# Patient Record
Sex: MALE | Race: WHITE | Employment: OTHER | URBAN - NONMETROPOLITAN AREA
[De-identification: names, ages, dates, MRNs, and addresses within clinical notes are randomized per-mention and may not be internally consistent; named-entity substitution may affect disease eponyms.]

---

## 2017-04-11 ENCOUNTER — OFFICE VISIT (OUTPATIENT)
Dept: FAMILY MEDICINE CLINIC | Age: 77
End: 2017-04-11

## 2017-04-11 VITALS
DIASTOLIC BLOOD PRESSURE: 62 MMHG | BODY MASS INDEX: 25.79 KG/M2 | WEIGHT: 190.4 LBS | SYSTOLIC BLOOD PRESSURE: 135 MMHG | HEIGHT: 72 IN | OXYGEN SATURATION: 97 % | HEART RATE: 70 BPM

## 2017-04-11 DIAGNOSIS — N40.1 BENIGN NON-NODULAR PROSTATIC HYPERPLASIA WITH LOWER URINARY TRACT SYMPTOMS: ICD-10-CM

## 2017-04-11 DIAGNOSIS — I10 ESSENTIAL HYPERTENSION: Primary | ICD-10-CM

## 2017-04-11 DIAGNOSIS — Z12.5 PROSTATE CANCER SCREENING: ICD-10-CM

## 2017-04-11 DIAGNOSIS — H35.30 MACULAR DEGENERATION: ICD-10-CM

## 2017-04-11 DIAGNOSIS — H40.9 GLAUCOMA OF BOTH EYES, UNSPECIFIED GLAUCOMA: ICD-10-CM

## 2017-04-11 PROCEDURE — G8420 CALC BMI NORM PARAMETERS: HCPCS | Performed by: FAMILY MEDICINE

## 2017-04-11 PROCEDURE — 1036F TOBACCO NON-USER: CPT | Performed by: FAMILY MEDICINE

## 2017-04-11 PROCEDURE — 1123F ACP DISCUSS/DSCN MKR DOCD: CPT | Performed by: FAMILY MEDICINE

## 2017-04-11 PROCEDURE — 99214 OFFICE O/P EST MOD 30 MIN: CPT | Performed by: FAMILY MEDICINE

## 2017-04-11 PROCEDURE — G8428 CUR MEDS NOT DOCUMENT: HCPCS | Performed by: FAMILY MEDICINE

## 2017-04-11 PROCEDURE — 4040F PNEUMOC VAC/ADMIN/RCVD: CPT | Performed by: FAMILY MEDICINE

## 2017-04-11 RX ORDER — TAMSULOSIN HYDROCHLORIDE 0.4 MG/1
0.4 CAPSULE ORAL DAILY
Qty: 30 CAPSULE | Refills: 3 | Status: SHIPPED | OUTPATIENT
Start: 2017-04-11 | End: 2017-11-28 | Stop reason: SDUPTHER

## 2017-04-11 ASSESSMENT — ENCOUNTER SYMPTOMS
BLURRED VISION: 0
ABDOMINAL PAIN: 0
SHORTNESS OF BREATH: 0

## 2017-04-26 DIAGNOSIS — Z12.5 PROSTATE CANCER SCREENING: ICD-10-CM

## 2017-04-26 DIAGNOSIS — I10 ESSENTIAL HYPERTENSION: ICD-10-CM

## 2017-04-26 LAB
ALT SERPL-CCNC: 18 U/L (ref 0–41)
ANION GAP SERPL CALCULATED.3IONS-SCNC: 15 MEQ/L (ref 7–13)
AST SERPL-CCNC: 22 U/L (ref 0–40)
BACTERIA: NORMAL /HPF
BASOPHILS ABSOLUTE: 0.1 K/UL (ref 0–0.2)
BASOPHILS RELATIVE PERCENT: 0.8 %
BILIRUBIN URINE: ABNORMAL
BLOOD, URINE: NEGATIVE
BUN BLDV-MCNC: 12 MG/DL (ref 8–23)
CALCIUM SERPL-MCNC: 8.9 MG/DL (ref 8.6–10.2)
CASTS: NORMAL /LPF
CHLORIDE BLD-SCNC: 100 MEQ/L (ref 98–107)
CHOLESTEROL, TOTAL: 201 MG/DL (ref 0–199)
CLARITY: ABNORMAL
CO2: 25 MEQ/L (ref 22–29)
COLOR: ABNORMAL
CREAT SERPL-MCNC: 0.64 MG/DL (ref 0.7–1.2)
EOSINOPHILS ABSOLUTE: 0.1 K/UL (ref 0–0.7)
EOSINOPHILS RELATIVE PERCENT: 1.6 %
EPITHELIAL CELLS, UA: NORMAL /HPF
GFR AFRICAN AMERICAN: >60
GFR NON-AFRICAN AMERICAN: >60
GLUCOSE BLD-MCNC: 87 MG/DL (ref 74–109)
GLUCOSE URINE: NEGATIVE MG/DL
HCT VFR BLD CALC: 38.3 % (ref 42–52)
HDLC SERPL-MCNC: 140 MG/DL (ref 40–59)
HEMOGLOBIN: 12.9 G/DL (ref 14–18)
KETONES, URINE: >=80 MG/DL
LDL CHOLESTEROL CALCULATED: 53 MG/DL (ref 0–129)
LEUKOCYTE ESTERASE, URINE: ABNORMAL
LYMPHOCYTES ABSOLUTE: 0.7 K/UL (ref 1–4.8)
LYMPHOCYTES RELATIVE PERCENT: 10.2 %
MCH RBC QN AUTO: 34.6 PG (ref 27–31.3)
MCHC RBC AUTO-ENTMCNC: 33.6 % (ref 33–37)
MCV RBC AUTO: 103.2 FL (ref 80–100)
MONOCYTES ABSOLUTE: 0.5 K/UL (ref 0.2–0.8)
MONOCYTES RELATIVE PERCENT: 6.8 %
MUCUS: PRESENT
NEUTROPHILS ABSOLUTE: 5.6 K/UL (ref 1.4–6.5)
NEUTROPHILS RELATIVE PERCENT: 80.6 %
NITRITE, URINE: NEGATIVE
PDW BLD-RTO: 14.8 % (ref 11.5–14.5)
PH UA: 5.5 (ref 5–9)
PLATELET # BLD: 144 K/UL (ref 130–400)
POTASSIUM SERPL-SCNC: 3.9 MEQ/L (ref 3.5–5.1)
PROSTATE SPECIFIC ANTIGEN: 0.87 NG/ML (ref 0–6.22)
PROTEIN UA: ABNORMAL MG/DL
RBC # BLD: 3.71 M/UL (ref 4.7–6.1)
RBC UA: NORMAL /HPF (ref 0–2)
RENAL EPITHELIAL, UA: NORMAL /HPF
SODIUM BLD-SCNC: 140 MEQ/L (ref 132–144)
SPECIFIC GRAVITY UA: 1.02 (ref 1–1.03)
TRIGL SERPL-MCNC: 39 MG/DL (ref 0–200)
UROBILINOGEN, URINE: 1 E.U./DL
WBC # BLD: 6.9 K/UL (ref 4.8–10.8)
WBC UA: NORMAL /HPF (ref 0–5)

## 2017-07-11 ENCOUNTER — OFFICE VISIT (OUTPATIENT)
Dept: FAMILY MEDICINE CLINIC | Age: 77
End: 2017-07-11

## 2017-07-11 VITALS
WEIGHT: 181.2 LBS | BODY MASS INDEX: 24.54 KG/M2 | DIASTOLIC BLOOD PRESSURE: 62 MMHG | HEART RATE: 68 BPM | OXYGEN SATURATION: 97 % | SYSTOLIC BLOOD PRESSURE: 106 MMHG | HEIGHT: 72 IN

## 2017-07-11 DIAGNOSIS — I10 ESSENTIAL HYPERTENSION: Primary | ICD-10-CM

## 2017-07-11 DIAGNOSIS — N40.1 BENIGN NON-NODULAR PROSTATIC HYPERPLASIA WITH LOWER URINARY TRACT SYMPTOMS: ICD-10-CM

## 2017-07-11 PROCEDURE — 1123F ACP DISCUSS/DSCN MKR DOCD: CPT | Performed by: FAMILY MEDICINE

## 2017-07-11 PROCEDURE — 1036F TOBACCO NON-USER: CPT | Performed by: FAMILY MEDICINE

## 2017-07-11 PROCEDURE — 4040F PNEUMOC VAC/ADMIN/RCVD: CPT | Performed by: FAMILY MEDICINE

## 2017-07-11 PROCEDURE — G8420 CALC BMI NORM PARAMETERS: HCPCS | Performed by: FAMILY MEDICINE

## 2017-07-11 PROCEDURE — 99214 OFFICE O/P EST MOD 30 MIN: CPT | Performed by: FAMILY MEDICINE

## 2017-07-11 PROCEDURE — G8427 DOCREV CUR MEDS BY ELIG CLIN: HCPCS | Performed by: FAMILY MEDICINE

## 2017-07-11 ASSESSMENT — ENCOUNTER SYMPTOMS
ABDOMINAL PAIN: 0
SHORTNESS OF BREATH: 0
BLURRED VISION: 0

## 2017-07-14 ENCOUNTER — APPOINTMENT (OUTPATIENT)
Dept: GENERAL RADIOLOGY | Age: 77
End: 2017-07-14
Payer: MEDICARE

## 2017-07-14 ENCOUNTER — APPOINTMENT (OUTPATIENT)
Dept: CT IMAGING | Age: 77
End: 2017-07-14
Payer: MEDICARE

## 2017-07-14 ENCOUNTER — HOSPITAL ENCOUNTER (EMERGENCY)
Age: 77
Discharge: HOME | End: 2017-07-14
Payer: MEDICARE

## 2017-07-14 VITALS
TEMPERATURE: 98.9 F | WEIGHT: 181 LBS | HEIGHT: 73 IN | OXYGEN SATURATION: 100 % | BODY MASS INDEX: 23.99 KG/M2 | HEART RATE: 86 BPM | DIASTOLIC BLOOD PRESSURE: 74 MMHG | RESPIRATION RATE: 14 BRPM | SYSTOLIC BLOOD PRESSURE: 152 MMHG

## 2017-07-14 DIAGNOSIS — R53.1 GENERAL WEAKNESS: ICD-10-CM

## 2017-07-14 DIAGNOSIS — E86.0 DEHYDRATION: Primary | ICD-10-CM

## 2017-07-14 LAB
ALBUMIN SERPL-MCNC: 4.4 G/DL (ref 3.9–4.9)
ALP BLD-CCNC: 47 U/L (ref 35–104)
ALT SERPL-CCNC: 52 U/L (ref 0–41)
AMPHETAMINE SCREEN, URINE: NORMAL
ANION GAP SERPL CALCULATED.3IONS-SCNC: 16 MEQ/L (ref 7–13)
AST SERPL-CCNC: 60 U/L (ref 0–40)
BARBITURATE SCREEN URINE: NORMAL
BASOPHILS ABSOLUTE: 0 K/UL (ref 0–0.2)
BASOPHILS RELATIVE PERCENT: 0.8 %
BENZODIAZEPINE SCREEN, URINE: NORMAL
BILIRUB SERPL-MCNC: 0.8 MG/DL (ref 0–1.2)
BILIRUBIN URINE: ABNORMAL
BLOOD, URINE: NEGATIVE
BUN BLDV-MCNC: 17 MG/DL (ref 8–23)
CALCIUM SERPL-MCNC: 8.9 MG/DL (ref 8.6–10.2)
CANNABINOID SCREEN URINE: NORMAL
CHLORIDE BLD-SCNC: 97 MEQ/L (ref 98–107)
CHP ED QC CHECK: NORMAL
CLARITY: CLEAR
CO2: 24 MEQ/L (ref 22–29)
COCAINE METABOLITE SCREEN URINE: NORMAL
COLOR: YELLOW
CREAT SERPL-MCNC: 0.76 MG/DL (ref 0.7–1.2)
EOSINOPHILS ABSOLUTE: 0.1 K/UL (ref 0–0.7)
EOSINOPHILS RELATIVE PERCENT: 2.3 %
ETHANOL PERCENT: NORMAL G/DL
ETHANOL: <10 MG/DL (ref 0–0.08)
GFR AFRICAN AMERICAN: >60
GFR NON-AFRICAN AMERICAN: >60
GLOBULIN: 2.3 G/DL (ref 2.3–3.5)
GLUCOSE BLD-MCNC: 103 MG/DL
GLUCOSE BLD-MCNC: 103 MG/DL (ref 60–115)
GLUCOSE BLD-MCNC: 96 MG/DL (ref 74–109)
GLUCOSE URINE: NEGATIVE MG/DL
HCT VFR BLD CALC: 40 % (ref 42–52)
HEMOGLOBIN: 13 G/DL (ref 14–18)
KETONES, URINE: 40 MG/DL
LACTIC ACID: 1.3 MMOL/L (ref 0.5–2.2)
LEUKOCYTE ESTERASE, URINE: NEGATIVE
LYMPHOCYTES ABSOLUTE: 1 K/UL (ref 1–4.8)
LYMPHOCYTES RELATIVE PERCENT: 17.1 %
Lab: NORMAL
MAGNESIUM: 1.8 MG/DL (ref 1.7–2.3)
MCH RBC QN AUTO: 33.6 PG (ref 27–31.3)
MCHC RBC AUTO-ENTMCNC: 32.6 % (ref 33–37)
MCV RBC AUTO: 103.1 FL (ref 80–100)
MONOCYTES ABSOLUTE: 0.5 K/UL (ref 0.2–0.8)
MONOCYTES RELATIVE PERCENT: 9.2 %
MUCUS: PRESENT
NEUTROPHILS ABSOLUTE: 4.2 K/UL (ref 1.4–6.5)
NEUTROPHILS RELATIVE PERCENT: 70.6 %
NITRITE, URINE: NEGATIVE
OPIATE SCREEN URINE: NORMAL
PDW BLD-RTO: 13.9 % (ref 11.5–14.5)
PERFORMED ON: NORMAL
PH UA: 8 (ref 5–9)
PHENCYCLIDINE SCREEN URINE: NORMAL
PLATELET # BLD: 123 K/UL (ref 130–400)
POTASSIUM SERPL-SCNC: 4.2 MEQ/L (ref 3.5–5.1)
PRO-BNP: 129 PG/ML
PROTEIN UA: 30 MG/DL
RBC # BLD: 3.88 M/UL (ref 4.7–6.1)
RBC UA: NORMAL /HPF (ref 0–2)
SODIUM BLD-SCNC: 137 MEQ/L (ref 132–144)
SPECIFIC GRAVITY UA: 1.02 (ref 1–1.03)
TOTAL CK: 57 U/L (ref 0–190)
TOTAL PROTEIN: 6.7 G/DL (ref 6.4–8.1)
TROPONIN: <0.01 NG/ML (ref 0–0.01)
URINE REFLEX TO CULTURE: YES
UROBILINOGEN, URINE: 2 E.U./DL
WBC # BLD: 5.9 K/UL (ref 4.8–10.8)
WBC UA: NORMAL /HPF (ref 0–5)

## 2017-07-14 PROCEDURE — 83735 ASSAY OF MAGNESIUM: CPT

## 2017-07-14 PROCEDURE — 80053 COMPREHEN METABOLIC PANEL: CPT

## 2017-07-14 PROCEDURE — 83880 ASSAY OF NATRIURETIC PEPTIDE: CPT

## 2017-07-14 PROCEDURE — 93005 ELECTROCARDIOGRAM TRACING: CPT

## 2017-07-14 PROCEDURE — 83605 ASSAY OF LACTIC ACID: CPT

## 2017-07-14 PROCEDURE — 80307 DRUG TEST PRSMV CHEM ANLYZR: CPT

## 2017-07-14 PROCEDURE — 81001 URINALYSIS AUTO W/SCOPE: CPT

## 2017-07-14 PROCEDURE — 70450 CT HEAD/BRAIN W/O DYE: CPT

## 2017-07-14 PROCEDURE — 2580000003 HC RX 258: Performed by: PHYSICIAN ASSISTANT

## 2017-07-14 PROCEDURE — 99285 EMERGENCY DEPT VISIT HI MDM: CPT

## 2017-07-14 PROCEDURE — 85025 COMPLETE CBC W/AUTO DIFF WBC: CPT

## 2017-07-14 PROCEDURE — 96360 HYDRATION IV INFUSION INIT: CPT

## 2017-07-14 PROCEDURE — 82550 ASSAY OF CK (CPK): CPT

## 2017-07-14 PROCEDURE — 87086 URINE CULTURE/COLONY COUNT: CPT

## 2017-07-14 PROCEDURE — 84484 ASSAY OF TROPONIN QUANT: CPT

## 2017-07-14 PROCEDURE — 71010 XR CHEST PORTABLE: CPT

## 2017-07-14 PROCEDURE — G0480 DRUG TEST DEF 1-7 CLASSES: HCPCS

## 2017-07-14 PROCEDURE — 36415 COLL VENOUS BLD VENIPUNCTURE: CPT

## 2017-07-14 PROCEDURE — 96361 HYDRATE IV INFUSION ADD-ON: CPT

## 2017-07-14 RX ORDER — 0.9 % SODIUM CHLORIDE 0.9 %
1000 INTRAVENOUS SOLUTION INTRAVENOUS ONCE
Status: COMPLETED | OUTPATIENT
Start: 2017-07-14 | End: 2017-07-14

## 2017-07-14 RX ADMIN — SODIUM CHLORIDE 1000 ML: 9 INJECTION, SOLUTION INTRAVENOUS at 18:20

## 2017-07-14 ASSESSMENT — ENCOUNTER SYMPTOMS
ALLERGIC/IMMUNOLOGIC NEGATIVE: 1
ABDOMINAL PAIN: 0
EYE PAIN: 0
NAUSEA: 1
DIARRHEA: 0
VOMITING: 0
PHOTOPHOBIA: 0
VOICE CHANGE: 0
APNEA: 0
TROUBLE SWALLOWING: 0
COLOR CHANGE: 0
BLOOD IN STOOL: 0
SHORTNESS OF BREATH: 0

## 2017-07-16 LAB — URINE CULTURE, ROUTINE: NORMAL

## 2017-07-17 LAB
EKG ATRIAL RATE: 77 BPM
EKG P AXIS: 16 DEGREES
EKG P-R INTERVAL: 152 MS
EKG Q-T INTERVAL: 398 MS
EKG QRS DURATION: 92 MS
EKG QTC CALCULATION (BAZETT): 450 MS
EKG R AXIS: 26 DEGREES
EKG T AXIS: 29 DEGREES
EKG VENTRICULAR RATE: 77 BPM

## 2017-07-21 ENCOUNTER — OFFICE VISIT (OUTPATIENT)
Dept: FAMILY MEDICINE CLINIC | Age: 77
End: 2017-07-21

## 2017-07-21 VITALS
SYSTOLIC BLOOD PRESSURE: 172 MMHG | HEIGHT: 72 IN | HEART RATE: 70 BPM | TEMPERATURE: 98.2 F | BODY MASS INDEX: 24.98 KG/M2 | WEIGHT: 184.4 LBS | DIASTOLIC BLOOD PRESSURE: 88 MMHG | OXYGEN SATURATION: 97 %

## 2017-07-21 DIAGNOSIS — R53.1 WEAKNESS: ICD-10-CM

## 2017-07-21 DIAGNOSIS — Z09 HOSPITAL DISCHARGE FOLLOW-UP: ICD-10-CM

## 2017-07-21 DIAGNOSIS — R55 SYNCOPE, UNSPECIFIED SYNCOPE TYPE: Primary | ICD-10-CM

## 2017-07-21 DIAGNOSIS — R01.1 HEART MURMUR: ICD-10-CM

## 2017-07-21 DIAGNOSIS — I35.0 AORTIC STENOSIS, MILD: ICD-10-CM

## 2017-07-21 DIAGNOSIS — I10 ESSENTIAL HYPERTENSION: ICD-10-CM

## 2017-07-21 PROCEDURE — G8427 DOCREV CUR MEDS BY ELIG CLIN: HCPCS | Performed by: NURSE PRACTITIONER

## 2017-07-21 PROCEDURE — 4040F PNEUMOC VAC/ADMIN/RCVD: CPT | Performed by: NURSE PRACTITIONER

## 2017-07-21 PROCEDURE — 1036F TOBACCO NON-USER: CPT | Performed by: NURSE PRACTITIONER

## 2017-07-21 PROCEDURE — 1123F ACP DISCUSS/DSCN MKR DOCD: CPT | Performed by: NURSE PRACTITIONER

## 2017-07-21 PROCEDURE — 99214 OFFICE O/P EST MOD 30 MIN: CPT | Performed by: NURSE PRACTITIONER

## 2017-07-21 PROCEDURE — G8419 CALC BMI OUT NRM PARAM NOF/U: HCPCS | Performed by: NURSE PRACTITIONER

## 2017-07-21 ASSESSMENT — ENCOUNTER SYMPTOMS
COUGH: 0
SHORTNESS OF BREATH: 0

## 2017-07-31 ENCOUNTER — TELEPHONE (OUTPATIENT)
Dept: FAMILY MEDICINE CLINIC | Age: 77
End: 2017-07-31

## 2017-07-31 DIAGNOSIS — I10 ESSENTIAL HYPERTENSION: Primary | ICD-10-CM

## 2017-07-31 RX ORDER — VALSARTAN 40 MG/1
40 TABLET ORAL DAILY
Qty: 90 TABLET | Refills: 3 | Status: SHIPPED | OUTPATIENT
Start: 2017-07-31 | End: 2017-11-02 | Stop reason: ALTCHOICE

## 2017-08-16 ENCOUNTER — HOSPITAL ENCOUNTER (OUTPATIENT)
Dept: ULTRASOUND IMAGING | Age: 77
Discharge: HOME | End: 2017-08-16
Payer: MEDICARE

## 2017-08-16 ENCOUNTER — HOSPITAL ENCOUNTER (OUTPATIENT)
Dept: NON INVASIVE DIAGNOSTICS | Age: 77
Discharge: HOME | End: 2017-08-16
Payer: MEDICARE

## 2017-08-16 DIAGNOSIS — R53.1 WEAKNESS: ICD-10-CM

## 2017-08-16 DIAGNOSIS — R55 SYNCOPE, UNSPECIFIED SYNCOPE TYPE: ICD-10-CM

## 2017-08-16 DIAGNOSIS — I35.0 AORTIC STENOSIS, MILD: ICD-10-CM

## 2017-08-16 DIAGNOSIS — R01.1 HEART MURMUR: ICD-10-CM

## 2017-08-16 DIAGNOSIS — Z09 HOSPITAL DISCHARGE FOLLOW-UP: ICD-10-CM

## 2017-08-16 LAB
LV EF: 65 %
LVEF MODALITY: NORMAL

## 2017-08-16 PROCEDURE — 93880 EXTRACRANIAL BILAT STUDY: CPT

## 2017-08-16 PROCEDURE — 93306 TTE W/DOPPLER COMPLETE: CPT

## 2017-08-19 ENCOUNTER — HOSPITAL ENCOUNTER (EMERGENCY)
Age: 77
Discharge: HOME | End: 2017-08-19
Attending: EMERGENCY MEDICINE
Payer: MEDICARE

## 2017-08-19 VITALS
RESPIRATION RATE: 20 BRPM | HEIGHT: 72 IN | TEMPERATURE: 99 F | SYSTOLIC BLOOD PRESSURE: 150 MMHG | DIASTOLIC BLOOD PRESSURE: 73 MMHG | HEART RATE: 60 BPM | WEIGHT: 180 LBS | BODY MASS INDEX: 24.38 KG/M2 | OXYGEN SATURATION: 98 %

## 2017-08-19 DIAGNOSIS — S01.81XA FACIAL LACERATION, INITIAL ENCOUNTER: Primary | ICD-10-CM

## 2017-08-19 PROCEDURE — 12013 RPR F/E/E/N/L/M 2.6-5.0 CM: CPT

## 2017-08-19 PROCEDURE — 2500000003 HC RX 250 WO HCPCS: Performed by: EMERGENCY MEDICINE

## 2017-08-19 PROCEDURE — 99282 EMERGENCY DEPT VISIT SF MDM: CPT

## 2017-08-19 PROCEDURE — 6370000000 HC RX 637 (ALT 250 FOR IP): Performed by: EMERGENCY MEDICINE

## 2017-08-19 RX ORDER — AMOXICILLIN 500 MG/1
500 CAPSULE ORAL 3 TIMES DAILY
Qty: 21 CAPSULE | Refills: 0 | Status: SHIPPED | OUTPATIENT
Start: 2017-08-19 | End: 2017-08-26

## 2017-08-19 RX ORDER — LIDOCAINE HYDROCHLORIDE AND EPINEPHRINE 10; 10 MG/ML; UG/ML
20 INJECTION, SOLUTION INFILTRATION; PERINEURAL ONCE
Status: COMPLETED | OUTPATIENT
Start: 2017-08-19 | End: 2017-08-19

## 2017-08-19 RX ORDER — GINSENG 100 MG
CAPSULE ORAL ONCE
Status: COMPLETED | OUTPATIENT
Start: 2017-08-19 | End: 2017-08-19

## 2017-08-19 RX ORDER — AMOXICILLIN 500 MG/1
500 CAPSULE ORAL ONCE
Status: COMPLETED | OUTPATIENT
Start: 2017-08-19 | End: 2017-08-19

## 2017-08-19 RX ADMIN — AMOXICILLIN 500 MG: 500 CAPSULE ORAL at 20:44

## 2017-08-19 RX ADMIN — BACITRACIN: 500 OINTMENT TOPICAL at 20:44

## 2017-08-19 RX ADMIN — LIDOCAINE HYDROCHLORIDE AND EPINEPHRINE 50 ML: 10; 10 INJECTION, SOLUTION INFILTRATION; PERINEURAL at 20:02

## 2017-08-19 ASSESSMENT — ENCOUNTER SYMPTOMS
SHORTNESS OF BREATH: 0
COUGH: 0
BACK PAIN: 0
DIARRHEA: 0
ABDOMINAL PAIN: 0
FACIAL SWELLING: 0

## 2017-08-19 ASSESSMENT — PAIN SCALES - GENERAL: PAINLEVEL_OUTOF10: 0

## 2017-08-29 ENCOUNTER — TELEPHONE (OUTPATIENT)
Dept: FAMILY MEDICINE CLINIC | Age: 77
End: 2017-08-29

## 2017-08-29 ENCOUNTER — OFFICE VISIT (OUTPATIENT)
Dept: FAMILY MEDICINE CLINIC | Age: 77
End: 2017-08-29

## 2017-08-29 VITALS
OXYGEN SATURATION: 99 % | WEIGHT: 182 LBS | HEIGHT: 72 IN | HEART RATE: 68 BPM | SYSTOLIC BLOOD PRESSURE: 130 MMHG | BODY MASS INDEX: 24.65 KG/M2 | DIASTOLIC BLOOD PRESSURE: 76 MMHG

## 2017-08-29 DIAGNOSIS — I51.89 DIASTOLIC DYSFUNCTION: ICD-10-CM

## 2017-08-29 DIAGNOSIS — Z48.02 VISIT FOR SUTURE REMOVAL: ICD-10-CM

## 2017-08-29 DIAGNOSIS — I10 ESSENTIAL HYPERTENSION: Primary | ICD-10-CM

## 2017-08-29 DIAGNOSIS — I35.0 AORTIC VALVE STENOSIS, SEVERE: Primary | ICD-10-CM

## 2017-08-29 PROCEDURE — G8420 CALC BMI NORM PARAMETERS: HCPCS | Performed by: FAMILY MEDICINE

## 2017-08-29 PROCEDURE — G8427 DOCREV CUR MEDS BY ELIG CLIN: HCPCS | Performed by: FAMILY MEDICINE

## 2017-08-29 PROCEDURE — 1036F TOBACCO NON-USER: CPT | Performed by: FAMILY MEDICINE

## 2017-08-29 PROCEDURE — 4040F PNEUMOC VAC/ADMIN/RCVD: CPT | Performed by: FAMILY MEDICINE

## 2017-08-29 PROCEDURE — 1123F ACP DISCUSS/DSCN MKR DOCD: CPT | Performed by: FAMILY MEDICINE

## 2017-08-29 PROCEDURE — 99214 OFFICE O/P EST MOD 30 MIN: CPT | Performed by: FAMILY MEDICINE

## 2017-08-29 ASSESSMENT — ENCOUNTER SYMPTOMS
SHORTNESS OF BREATH: 0
BLURRED VISION: 0
ABDOMINAL PAIN: 0

## 2017-08-31 RX ORDER — AMLODIPINE BESYLATE 2.5 MG/1
TABLET ORAL
Qty: 90 TABLET | Refills: 3 | Status: SHIPPED | OUTPATIENT
Start: 2017-08-31 | End: 2017-11-02 | Stop reason: ALTCHOICE

## 2017-09-11 ENCOUNTER — TELEPHONE (OUTPATIENT)
Dept: FAMILY MEDICINE CLINIC | Age: 77
End: 2017-09-11

## 2017-11-02 ENCOUNTER — OFFICE VISIT (OUTPATIENT)
Dept: FAMILY MEDICINE CLINIC | Age: 77
End: 2017-11-02

## 2017-11-02 VITALS
HEART RATE: 81 BPM | WEIGHT: 180.6 LBS | DIASTOLIC BLOOD PRESSURE: 78 MMHG | OXYGEN SATURATION: 97 % | HEIGHT: 72 IN | SYSTOLIC BLOOD PRESSURE: 118 MMHG | BODY MASS INDEX: 24.46 KG/M2

## 2017-11-02 DIAGNOSIS — D64.9 ANEMIA, UNSPECIFIED TYPE: ICD-10-CM

## 2017-11-02 DIAGNOSIS — Z23 NEEDS FLU SHOT: ICD-10-CM

## 2017-11-02 DIAGNOSIS — I10 ESSENTIAL HYPERTENSION: Primary | ICD-10-CM

## 2017-11-02 DIAGNOSIS — I35.0 AORTIC VALVE STENOSIS, SEVERE: ICD-10-CM

## 2017-11-02 PROCEDURE — G8427 DOCREV CUR MEDS BY ELIG CLIN: HCPCS | Performed by: FAMILY MEDICINE

## 2017-11-02 PROCEDURE — 1036F TOBACCO NON-USER: CPT | Performed by: FAMILY MEDICINE

## 2017-11-02 PROCEDURE — 1123F ACP DISCUSS/DSCN MKR DOCD: CPT | Performed by: FAMILY MEDICINE

## 2017-11-02 PROCEDURE — G8420 CALC BMI NORM PARAMETERS: HCPCS | Performed by: FAMILY MEDICINE

## 2017-11-02 PROCEDURE — 90662 IIV NO PRSV INCREASED AG IM: CPT | Performed by: FAMILY MEDICINE

## 2017-11-02 PROCEDURE — G0008 ADMIN INFLUENZA VIRUS VAC: HCPCS | Performed by: FAMILY MEDICINE

## 2017-11-02 PROCEDURE — 4040F PNEUMOC VAC/ADMIN/RCVD: CPT | Performed by: FAMILY MEDICINE

## 2017-11-02 PROCEDURE — 99214 OFFICE O/P EST MOD 30 MIN: CPT | Performed by: FAMILY MEDICINE

## 2017-11-02 PROCEDURE — G8484 FLU IMMUNIZE NO ADMIN: HCPCS | Performed by: FAMILY MEDICINE

## 2017-11-02 RX ORDER — VALSARTAN 80 MG/1
80 TABLET ORAL DAILY
COMMUNITY
End: 2018-04-26 | Stop reason: SDUPTHER

## 2017-11-02 ASSESSMENT — PATIENT HEALTH QUESTIONNAIRE - PHQ9
SUM OF ALL RESPONSES TO PHQ QUESTIONS 1-9: 0
2. FEELING DOWN, DEPRESSED OR HOPELESS: 0
1. LITTLE INTEREST OR PLEASURE IN DOING THINGS: 0
SUM OF ALL RESPONSES TO PHQ9 QUESTIONS 1 & 2: 0

## 2017-11-02 ASSESSMENT — ENCOUNTER SYMPTOMS
ABDOMINAL PAIN: 0
SHORTNESS OF BREATH: 0
BLURRED VISION: 0

## 2017-11-02 NOTE — PROGRESS NOTES
Subjective  Yosvany Sol, 65 y.o. male presents today with:  Chief Complaint   Patient presents with    3 Month Follow-Up       Hypertension   This is a chronic problem. The current episode started more than 1 year ago. The problem has been gradually improving since onset. The problem is controlled. Pertinent negatives include no blurred vision, chest pain, headaches, palpitations or shortness of breath. There are no associated agents to hypertension. Risk factors for coronary artery disease include male gender. Past treatments include angiotensin blockers and calcium channel blockers. The current treatment provides moderate improvement. There are no compliance problems. There is no history of kidney disease. diastolic dysfuntion. There is no history of chronic renal disease. Here today for f/u on chronic problems. He is seeing Dr. Juan Jose Dodd for severe AS and is followed by Dr. Elita Osler for anemia. Review of Systems   Constitutional: Negative for fever. Eyes: Negative for blurred vision. Respiratory: Negative for shortness of breath. Cardiovascular: Negative for chest pain and palpitations. Gastrointestinal: Negative for abdominal pain. Skin: Negative for rash. Neurological: Negative for headaches.        Past Medical History:   Diagnosis Date    Actinic keratoses     Aortic stenosis, mild 9/20/2013    Aortic valve stenosis, severe 9/20/2013    BPH (benign prostatic hyperplasia)     Colon polyp 10/75/3991    Diastolic dysfunction 77/59/9218    Stage 2 by echo    ED (erectile dysfunction) 11/29/2011    Glaucoma     Dr. Fanny Bhakta Hemochromatosis     History of echocardiogram 02/18/2015    mild AS, mild AR, mild TR, mild MR    [place]    Hypertension     Iron deficiency anemia 4/28/2014    LVH (left ventricular hypertrophy) 9/20/2013    Macular degeneration     bilateral; dry on left and wet on right-Dr. Debby Ambrosio on back 5/14/2015     Past Surgical History:   Procedure Laterality Date    BACK SURGERY      CATARACT REMOVAL      COLONOSCOPY  11/2009    single polyp right colon    EYE SURGERY Bilateral     OTHER SURGICAL HISTORY  06/08/15    EXC SUBCU MASS BACK    TONSILLECTOMY       Social History     Social History    Marital status:      Spouse name: N/A    Number of children: 3    Years of education: N/A     Occupational History    retired [employer]     Social History Main Topics    Smoking status: Never Smoker    Smokeless tobacco: Never Used    Alcohol use Yes      Comment: rare    Drug use: No    Sexual activity: No     Other Topics Concern    Not on file     Social History Narrative    No narrative on file     No family history on file. No Known Allergies  Current Outpatient Prescriptions   Medication Sig Dispense Refill    valsartan (DIOVAN) 80 MG tablet Take 80 mg by mouth daily      tamsulosin (FLOMAX) 0.4 MG capsule Take 1 capsule by mouth daily 30 capsule 3    FOLIC ACID PO Take by mouth      Omega-3 Fatty Acids (FISH OIL PO) Take by mouth      Cyanocobalamin (VITAMIN B 12 PO) Take by mouth      brimonidine (ALPHAGAN P) 0.1 % SOLN Place 1 drop into both eyes 3 times daily.  dorzolamide-timolol (COSOPT) 22.3-6.8 MG/ML ophthalmic solution Place 1 drop into both eyes 2 times daily.  bimatoprost (LUMIGAN) 0.03 % ophthalmic drops Place 1 drop into the left eye nightly. No current facility-administered medications for this visit. Objective    Vitals:    11/02/17 0812   BP: 118/78   Pulse: 81   SpO2: 97%   Weight: 180 lb 9.6 oz (81.9 kg)   Height: 6' (1.829 m)       Physical Exam   Constitutional: He appears well-developed. HENT:   Head: Normocephalic and atraumatic.    Right Ear: Tympanic membrane, external ear and ear canal normal.   Left Ear: Tympanic membrane, external ear and ear canal normal.   Nose: Nose normal.   Mouth/Throat: Uvula is midline, oropharynx is clear and moist and mucous membranes are normal.   Cardiovascular: Normal rate. Murmur heard. Pulmonary/Chest: Effort normal and breath sounds normal. He has no wheezes. Abdominal: Soft. Bowel sounds are normal. There is no tenderness. Genitourinary: Rectal exam shows no external hemorrhoid and no mass. Musculoskeletal: Normal range of motion. Neurological: He is alert. Skin: Skin is warm and dry. Psychiatric: He has a normal mood and affect. Assessment & Plan   1. Essential hypertension     2. Aortic valve stenosis, severe     3. Anemia, unspecified type     4. Needs flu shot  INFLUENZA, HIGH DOSE, 65 YRS +, IM, PF, PREFILL SYR, 0.5ML (FLUZONE HD)     BP controlled so continue same. Anemia stable and followed by Dr. Karina Adame. He will f/u with cardiology for his AS as scheduled. He will f/u with me in 4 months. Orders Placed This Encounter   Procedures    INFLUENZA, HIGH DOSE, 65 YRS +, IM, PF, PREFILL SYR, 0.5ML (FLUZONE HD)     No orders of the defined types were placed in this encounter. Medications Discontinued During This Encounter   Medication Reason    amLODIPine (NORVASC) 2.5 MG tablet Discontinued by another clinician    valsartan (DIOVAN) 40 MG tablet Therapy completed     Return in about 4 months (around 3/2/2018).     Kaia Mcclure MD

## 2017-11-28 DIAGNOSIS — N40.1 BENIGN NON-NODULAR PROSTATIC HYPERPLASIA WITH LOWER URINARY TRACT SYMPTOMS: ICD-10-CM

## 2017-11-29 DIAGNOSIS — N40.1 BENIGN NON-NODULAR PROSTATIC HYPERPLASIA WITH LOWER URINARY TRACT SYMPTOMS: ICD-10-CM

## 2017-11-29 RX ORDER — TAMSULOSIN HYDROCHLORIDE 0.4 MG/1
CAPSULE ORAL
Qty: 30 CAPSULE | Refills: 2 | OUTPATIENT
Start: 2017-11-29

## 2017-11-29 RX ORDER — TAMSULOSIN HYDROCHLORIDE 0.4 MG/1
0.4 CAPSULE ORAL DAILY
Qty: 30 CAPSULE | Refills: 11 | Status: SHIPPED | OUTPATIENT
Start: 2017-11-29 | End: 2018-04-26 | Stop reason: SDUPTHER

## 2018-04-17 ENCOUNTER — HOSPITAL ENCOUNTER (EMERGENCY)
Age: 78
Discharge: HOME | End: 2018-04-17
Attending: EMERGENCY MEDICINE
Payer: MEDICARE

## 2018-04-17 ENCOUNTER — APPOINTMENT (OUTPATIENT)
Dept: CT IMAGING | Age: 78
End: 2018-04-17
Payer: MEDICARE

## 2018-04-17 VITALS
DIASTOLIC BLOOD PRESSURE: 73 MMHG | BODY MASS INDEX: 24.41 KG/M2 | TEMPERATURE: 97.7 F | SYSTOLIC BLOOD PRESSURE: 115 MMHG | HEART RATE: 64 BPM | OXYGEN SATURATION: 94 % | RESPIRATION RATE: 18 BRPM | WEIGHT: 180 LBS

## 2018-04-17 DIAGNOSIS — S09.90XA INJURY OF HEAD, INITIAL ENCOUNTER: Primary | ICD-10-CM

## 2018-04-17 DIAGNOSIS — S01.01XA LACERATION OF SCALP, INITIAL ENCOUNTER: ICD-10-CM

## 2018-04-17 PROCEDURE — 70450 CT HEAD/BRAIN W/O DYE: CPT

## 2018-04-17 PROCEDURE — 99283 EMERGENCY DEPT VISIT LOW MDM: CPT

## 2018-04-17 PROCEDURE — 6370000000 HC RX 637 (ALT 250 FOR IP): Performed by: EMERGENCY MEDICINE

## 2018-04-17 PROCEDURE — 72125 CT NECK SPINE W/O DYE: CPT

## 2018-04-17 RX ORDER — DIAPER,BRIEF,INFANT-TODD,DISP
EACH MISCELLANEOUS ONCE
Status: COMPLETED | OUTPATIENT
Start: 2018-04-17 | End: 2018-04-17

## 2018-04-17 RX ADMIN — BACITRACIN ZINC: 500 OINTMENT TOPICAL at 10:00

## 2018-04-17 ASSESSMENT — ENCOUNTER SYMPTOMS
SHORTNESS OF BREATH: 0
CHEST TIGHTNESS: 0
VOMITING: 0
NAUSEA: 0
ABDOMINAL PAIN: 0
EYE PAIN: 0
SORE THROAT: 0

## 2018-04-25 ENCOUNTER — OFFICE VISIT (OUTPATIENT)
Dept: FAMILY MEDICINE CLINIC | Age: 78
End: 2018-04-25
Payer: MEDICARE

## 2018-04-25 VITALS
HEART RATE: 71 BPM | SYSTOLIC BLOOD PRESSURE: 136 MMHG | WEIGHT: 178.8 LBS | DIASTOLIC BLOOD PRESSURE: 68 MMHG | OXYGEN SATURATION: 97 % | HEIGHT: 72 IN | BODY MASS INDEX: 24.22 KG/M2

## 2018-04-25 DIAGNOSIS — I10 ESSENTIAL HYPERTENSION: Primary | ICD-10-CM

## 2018-04-25 DIAGNOSIS — D64.9 ANEMIA, UNSPECIFIED TYPE: ICD-10-CM

## 2018-04-25 DIAGNOSIS — H40.9 GLAUCOMA, UNSPECIFIED GLAUCOMA TYPE, UNSPECIFIED LATERALITY: ICD-10-CM

## 2018-04-25 DIAGNOSIS — I35.0 AORTIC VALVE STENOSIS, SEVERE: ICD-10-CM

## 2018-04-25 DIAGNOSIS — N40.0 BENIGN PROSTATIC HYPERPLASIA WITHOUT LOWER URINARY TRACT SYMPTOMS: ICD-10-CM

## 2018-04-25 DIAGNOSIS — Z12.5 PROSTATE CANCER SCREENING: ICD-10-CM

## 2018-04-25 DIAGNOSIS — I10 ESSENTIAL HYPERTENSION: ICD-10-CM

## 2018-04-25 LAB
ALT SERPL-CCNC: 19 U/L (ref 0–41)
ANION GAP SERPL CALCULATED.3IONS-SCNC: 19 MEQ/L (ref 7–13)
ANISOCYTOSIS: ABNORMAL
AST SERPL-CCNC: 24 U/L (ref 0–40)
BANDED NEUTROPHILS RELATIVE PERCENT: 4 %
BASOPHILS ABSOLUTE: 0.1 K/UL (ref 0–0.2)
BASOPHILS RELATIVE PERCENT: 3 %
BUN BLDV-MCNC: 13 MG/DL (ref 8–23)
CALCIUM SERPL-MCNC: 8.8 MG/DL (ref 8.6–10.2)
CHLORIDE BLD-SCNC: 102 MEQ/L (ref 98–107)
CHOLESTEROL, TOTAL: 192 MG/DL (ref 0–199)
CO2: 23 MEQ/L (ref 22–29)
CREAT SERPL-MCNC: 0.71 MG/DL (ref 0.7–1.2)
EOSINOPHILS ABSOLUTE: 0.1 K/UL (ref 0–0.7)
EOSINOPHILS RELATIVE PERCENT: 3 %
GFR AFRICAN AMERICAN: >60
GFR NON-AFRICAN AMERICAN: >60
GLUCOSE BLD-MCNC: 84 MG/DL (ref 74–109)
HCT VFR BLD CALC: 33.5 % (ref 42–52)
HDLC SERPL-MCNC: 122 MG/DL (ref 40–59)
HEMOGLOBIN: 11.4 G/DL (ref 14–18)
LDL CHOLESTEROL CALCULATED: 62 MG/DL (ref 0–129)
LYMPHOCYTES ABSOLUTE: 1.1 K/UL (ref 1–4.8)
LYMPHOCYTES RELATIVE PERCENT: 22 %
MACROCYTES: ABNORMAL
MCH RBC QN AUTO: 37.2 PG (ref 27–31.3)
MCHC RBC AUTO-ENTMCNC: 34.1 % (ref 33–37)
MCV RBC AUTO: 109 FL (ref 80–100)
METAMYELOCYTES RELATIVE PERCENT: 1 %
MONOCYTES ABSOLUTE: 0.2 K/UL (ref 0.2–0.8)
MONOCYTES RELATIVE PERCENT: 4.8 %
NEUTROPHILS ABSOLUTE: 3.2 K/UL (ref 1.4–6.5)
NEUTROPHILS RELATIVE PERCENT: 61 %
PDW BLD-RTO: 15.4 % (ref 11.5–14.5)
PLATELET # BLD: 145 K/UL (ref 130–400)
PLATELET SLIDE REVIEW: ADEQUATE
POTASSIUM SERPL-SCNC: 4.1 MEQ/L (ref 3.5–5.1)
PROMYELOCYTES PERCENT: 1 %
PROSTATE SPECIFIC ANTIGEN: 0.69 NG/ML (ref 0–6.22)
RBC # BLD: 3.08 M/UL (ref 4.7–6.1)
SMUDGE CELLS: 1.9
SODIUM BLD-SCNC: 144 MEQ/L (ref 132–144)
TRIGL SERPL-MCNC: 39 MG/DL (ref 0–200)
WBC # BLD: 4.8 K/UL (ref 4.8–10.8)

## 2018-04-25 PROCEDURE — G8427 DOCREV CUR MEDS BY ELIG CLIN: HCPCS | Performed by: FAMILY MEDICINE

## 2018-04-25 PROCEDURE — G8420 CALC BMI NORM PARAMETERS: HCPCS | Performed by: FAMILY MEDICINE

## 2018-04-25 PROCEDURE — 1036F TOBACCO NON-USER: CPT | Performed by: FAMILY MEDICINE

## 2018-04-25 PROCEDURE — 4040F PNEUMOC VAC/ADMIN/RCVD: CPT | Performed by: FAMILY MEDICINE

## 2018-04-25 PROCEDURE — 99213 OFFICE O/P EST LOW 20 MIN: CPT | Performed by: FAMILY MEDICINE

## 2018-04-25 PROCEDURE — 1123F ACP DISCUSS/DSCN MKR DOCD: CPT | Performed by: FAMILY MEDICINE

## 2018-04-25 ASSESSMENT — ENCOUNTER SYMPTOMS
SHORTNESS OF BREATH: 0
ABDOMINAL PAIN: 0

## 2018-04-26 DIAGNOSIS — N40.1 BENIGN NON-NODULAR PROSTATIC HYPERPLASIA WITH LOWER URINARY TRACT SYMPTOMS: ICD-10-CM

## 2018-04-26 RX ORDER — VALSARTAN 80 MG/1
80 TABLET ORAL DAILY
Qty: 90 TABLET | Refills: 3 | Status: SHIPPED | OUTPATIENT
Start: 2018-04-26 | End: 2018-10-30

## 2018-04-26 RX ORDER — TAMSULOSIN HYDROCHLORIDE 0.4 MG/1
0.4 CAPSULE ORAL DAILY
Qty: 90 CAPSULE | Refills: 3 | Status: ON HOLD | OUTPATIENT
Start: 2018-04-26 | End: 2020-06-29

## 2018-08-02 ENCOUNTER — TELEPHONE (OUTPATIENT)
Dept: FAMILY MEDICINE CLINIC | Age: 78
End: 2018-08-02

## 2018-08-02 NOTE — TELEPHONE ENCOUNTER
Called pt earline Dexter being recalled. He said their pharmacy called them and said they would send him a lot that was not recalled. He said he received that in the mail yesterday.

## 2018-10-29 ENCOUNTER — NURSE ONLY (OUTPATIENT)
Dept: FAMILY MEDICINE CLINIC | Age: 78
End: 2018-10-29

## 2018-10-29 VITALS — DIASTOLIC BLOOD PRESSURE: 78 MMHG | SYSTOLIC BLOOD PRESSURE: 152 MMHG

## 2018-10-29 DIAGNOSIS — Z01.30 BLOOD PRESSURE CHECK: Primary | ICD-10-CM

## 2018-10-30 ENCOUNTER — OFFICE VISIT (OUTPATIENT)
Dept: FAMILY MEDICINE CLINIC | Age: 78
End: 2018-10-30
Payer: MEDICARE

## 2018-10-30 VITALS
SYSTOLIC BLOOD PRESSURE: 160 MMHG | WEIGHT: 174.2 LBS | OXYGEN SATURATION: 97 % | HEART RATE: 68 BPM | BODY MASS INDEX: 23.6 KG/M2 | DIASTOLIC BLOOD PRESSURE: 80 MMHG | HEIGHT: 72 IN

## 2018-10-30 DIAGNOSIS — Z23 NEEDS FLU SHOT: ICD-10-CM

## 2018-10-30 DIAGNOSIS — I10 ESSENTIAL HYPERTENSION: Primary | ICD-10-CM

## 2018-10-30 DIAGNOSIS — G20 PARKINSON DISEASE (HCC): ICD-10-CM

## 2018-10-30 PROCEDURE — 1101F PT FALLS ASSESS-DOCD LE1/YR: CPT | Performed by: FAMILY MEDICINE

## 2018-10-30 PROCEDURE — G8420 CALC BMI NORM PARAMETERS: HCPCS | Performed by: FAMILY MEDICINE

## 2018-10-30 PROCEDURE — 90662 IIV NO PRSV INCREASED AG IM: CPT | Performed by: FAMILY MEDICINE

## 2018-10-30 PROCEDURE — G8427 DOCREV CUR MEDS BY ELIG CLIN: HCPCS | Performed by: FAMILY MEDICINE

## 2018-10-30 PROCEDURE — 1123F ACP DISCUSS/DSCN MKR DOCD: CPT | Performed by: FAMILY MEDICINE

## 2018-10-30 PROCEDURE — 99214 OFFICE O/P EST MOD 30 MIN: CPT | Performed by: FAMILY MEDICINE

## 2018-10-30 PROCEDURE — 4040F PNEUMOC VAC/ADMIN/RCVD: CPT | Performed by: FAMILY MEDICINE

## 2018-10-30 PROCEDURE — G0008 ADMIN INFLUENZA VIRUS VAC: HCPCS | Performed by: FAMILY MEDICINE

## 2018-10-30 PROCEDURE — G8484 FLU IMMUNIZE NO ADMIN: HCPCS | Performed by: FAMILY MEDICINE

## 2018-10-30 PROCEDURE — 1036F TOBACCO NON-USER: CPT | Performed by: FAMILY MEDICINE

## 2018-10-30 RX ORDER — VALSARTAN 40 MG/1
TABLET ORAL
Qty: 30 TABLET | Refills: 2 | Status: ON HOLD | OUTPATIENT
Start: 2018-10-30 | End: 2020-07-01

## 2018-10-30 ASSESSMENT — ENCOUNTER SYMPTOMS
SHORTNESS OF BREATH: 0
ABDOMINAL PAIN: 0

## 2018-10-30 NOTE — PATIENT INSTRUCTIONS
Diovan 40 mg take 1/2 tab daily. Stop in for BP check in 2 weeks. Patient Education        Parkinson's Disease: Care Instructions  Your Care Instructions  Parkinson's disease can cause tremors, stiffness, and problems with movement. Severe or advanced cases can also cause problems with thinking. In Parkinson's disease, part of the brain cannot make enough dopamine, a chemical that helps control movement. Taking your medicines correctly and getting regular exercise may help you maintain your quality of life. There are many things that can cause Parkinson's disease symptoms, including some medicine, some toxins, and trauma to the head. The cause in most cases is not known. Follow-up care is a key part of your treatment and safety. Be sure to make and go to all appointments, and call your doctor if you are having problems. It's also a good idea to know your test results and keep a list of the medicines you take. How can you care for yourself at home? General care  · Take your medicines exactly as prescribed. Call your doctor if you think you are having a problem with your medicine. · Make sure your home is safe:  ¨ Place furniture so that you have something to hold on to as you walk around the house. ¨ Use chairs that make it easier to sit down and stand up. ¨ Group the things you use most, such as reading glasses, keys, and the telephone, in one easy-to-reach place. ¨ Tack down rugs so that you do not trip. ¨ Put no-slip tape and handrails in the tub to prevent falls. · Use a cane, walker, or scooter if your doctor suggests it. · Keep up your normal activities as much as you can. · Find ways to manage stress, which can make symptoms worse. · Spend time with family and friends. Join a support group for people with Parkinson's disease if you want extra help. · Depression is common with this condition.  Tell your doctor if you have trouble sleeping, are eating too much or are not hungry, or feel sad or tearful all the time. Depression can be treated with medicine and counseling. Diet and exercise  · Eat a balanced diet. · If you are taking levodopa, do not eat protein at the same time you take your medicine. Levodopa may not work as well if you take it at the same time you eat protein. You can eat normal amounts of protein. Talk to your doctor if you have questions. · If you have problems swallowing, change how and what you eat:  ¨ Try thick drinks, such as milk shakes. They are easier to swallow than other fluids. ¨ Do not eat foods that crumble easily. These can cause choking. ¨ Use a  to prepare food. Soft foods need less chewing. ¨ Eat small meals often so that you do not get tired from eating heavy meals. · Drink plenty of water and eat a high-fiber diet to prevent constipation. Parkinson's-and the medicines that treat it-may slow your intestines. · Get exercise on most days. Work with your doctor to set up a program of walking, swimming, or other exercise you are able to do. When should you call for help? Call your doctor now or seek immediate medical care if:    · You have a change in your symptoms.     · You develop other problems from your condition, such as:  ¨ Injury from a fall. ¨ Thinking or memory problems. ¨ A urinary tract infection (burning pain when urinating).    Watch closely for changes in your health, and be sure to contact your doctor if:    · You lose weight because of problems with eating.     · You want more information about your condition or your medicines. Where can you learn more? Go to https://[employer]fruits. org and sign in to your [employer] account. Enter N385 in the News360 box to learn more about \"Parkinson's Disease: Care Instructions. \"     If you do not have an account, please click on the \"Sign Up Now\" link. Current as of: October 9, 2017  Content Version: 11.7  © 8844-7908 [employer].  Care instructions adapted

## 2018-10-30 NOTE — PROGRESS NOTES
right-Dr. Tiarra Maddox Mass on back 5/14/2015    Osteoarthritis cervical spine     Parkinson disease ([facility] [address]     Dr. Alexander Villa     Past Surgical History:   Procedure Laterality Date    BACK SURGERY      CARDIAC CATHETERIZATION  11/09/2017    [facility]AR [facility] MD    CATARACT REMOVAL      COLONOSCOPY  11/2009    single polyp right colon    EYE SURGERY Bilateral     OTHER SURGICAL HISTORY  06/08/15    EXC SUBCU MASS BACK    TONSILLECTOMY       Social History     Social History    Marital status:      Spouse name: N/A    Number of children: 3    Years of education: N/A     Occupational History    retired [employer]     Social History Main Topics    Smoking status: Never Smoker    Smokeless tobacco: Never Used    Alcohol use Yes      Comment: rare    Drug use: No    Sexual activity: No     Other Topics Concern    Not on file     Social History Narrative    Lives with wife. No family history on file. No Known Allergies  Current Outpatient Prescriptions   Medication Sig Dispense Refill    carbidopa-levodopa (SINEMET)  MG per tablet Take 1 tablet by mouth 3 times daily      valsartan (DIOVAN) 40 MG tablet Take 1/2 tab daily 30 tablet 2    tamsulosin (FLOMAX) 0.4 MG capsule Take 1 capsule by mouth daily 90 capsule 3    mupirocin (BACTROBAN) 2 % ointment Apply topically 3 times daily. 30 g 0    FOLIC ACID PO Take by mouth      Omega-3 Fatty Acids (FISH OIL PO) Take by mouth      Cyanocobalamin (VITAMIN B 12 PO) Take by mouth      brimonidine (ALPHAGAN P) 0.1 % SOLN Place 1 drop into both eyes 3 times daily.  dorzolamide-timolol (COSOPT) 22.3-6.8 MG/ML ophthalmic solution Place 1 drop into both eyes 2 times daily.  bimatoprost (LUMIGAN) 0.03 % ophthalmic drops Place 1 drop into the left eye nightly. No current facility-administered medications for this visit.         Objective    Vitals:    10/30/18 0908 10/30/18 0913   BP: (!) 160/88 (!) 160/80   Pulse: 68

## 2019-05-23 PROBLEM — D46.Z MDS (MYELODYSPLASTIC SYNDROME), LOW GRADE (HCC): Status: ACTIVE | Noted: 2019-05-23

## 2019-05-23 PROBLEM — D46.20 MDS (MYELODYSPLASTIC SYNDROME), LOW GRADE (HCC): Status: ACTIVE | Noted: 2019-05-23

## 2019-10-15 ENCOUNTER — HOSPITAL ENCOUNTER (EMERGENCY)
Age: 79
Discharge: HOME | End: 2019-10-15
Attending: STUDENT IN AN ORGANIZED HEALTH CARE EDUCATION/TRAINING PROGRAM
Payer: MEDICARE

## 2019-10-15 ENCOUNTER — APPOINTMENT (OUTPATIENT)
Dept: GENERAL RADIOLOGY | Age: 79
End: 2019-10-15
Payer: MEDICARE

## 2019-10-15 VITALS
SYSTOLIC BLOOD PRESSURE: 136 MMHG | HEIGHT: 72 IN | RESPIRATION RATE: 18 BRPM | OXYGEN SATURATION: 98 % | BODY MASS INDEX: 23.03 KG/M2 | DIASTOLIC BLOOD PRESSURE: 88 MMHG | WEIGHT: 170 LBS | HEART RATE: 80 BPM | TEMPERATURE: 99.8 F
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
## 2019-10-15 DIAGNOSIS — F10.920 ALCOHOLIC INTOXICATION WITHOUT COMPLICATION (HCC): ICD-10-CM

## 2019-10-15 DIAGNOSIS — M25.552 LEFT HIP PAIN: ICD-10-CM
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
## 2019-10-15 DIAGNOSIS — S42.92XA CLOSED FRACTURE OF LEFT SHOULDER, INITIAL ENCOUNTER: Primary | ICD-10-CM

## 2019-10-15 LAB
ALBUMIN SERPL-MCNC: 4 G/DL (ref 3.5–4.6)
ALP BLD-CCNC: 68 U/L (ref 35–104)
ALT SERPL-CCNC: 12 U/L (ref 0–41)
ANION GAP SERPL CALCULATED.3IONS-SCNC: 17 MEQ/L (ref 9–15)
ANISOCYTOSIS: ABNORMAL
APTT: 22.6 SEC (ref 24.4–36.8)
AST SERPL-CCNC: 18 U/L (ref 0–40)
BANDED NEUTROPHILS RELATIVE PERCENT: 9 %
BASOPHILS ABSOLUTE: 0 K/UL (ref 0–0.2)
BASOPHILS RELATIVE PERCENT: 0.8 %
BILIRUB SERPL-MCNC: 0.3 MG/DL (ref 0.2–0.7)
BUN BLDV-MCNC: 13 MG/DL (ref 8–23)
CALCIUM SERPL-MCNC: 8.8 MG/DL (ref 8.5–9.9)
CHLORIDE BLD-SCNC: 104 MEQ/L (ref 95–107)
CO2: 22 MEQ/L (ref 20–31)
CREAT SERPL-MCNC: 0.68 MG/DL (ref 0.7–1.2)
EOSINOPHILS ABSOLUTE: 0 K/UL (ref 0–0.7)
EOSINOPHILS RELATIVE PERCENT: 0.5 %
ETHANOL PERCENT: 0.12 G/DL
ETHANOL: 137 MG/DL (ref 0–0.08)
GFR AFRICAN AMERICAN: >60
GFR NON-AFRICAN AMERICAN: >60
GLOBULIN: 2.4 G/DL (ref 2.3–3.5)
GLUCOSE BLD-MCNC: 110 MG/DL (ref 70–99)
HCT VFR BLD CALC: 33.7 % (ref 42–52)
HEMOGLOBIN: 11.4 G/DL (ref 14–18)
INR BLD: 0.9
LYMPHOCYTES ABSOLUTE: 0.7 K/UL (ref 1–4.8)
LYMPHOCYTES RELATIVE PERCENT: 8 %
MCH RBC QN AUTO: 37 PG (ref 27–31.3)
MCHC RBC AUTO-ENTMCNC: 33.8 % (ref 33–37)
MCV RBC AUTO: 109.6 FL (ref 80–100)
METAMYELOCYTES RELATIVE PERCENT: 1 %
MONOCYTES ABSOLUTE: 0.3 K/UL (ref 0.2–0.8)
MONOCYTES RELATIVE PERCENT: 3.8 %
NEUTROPHILS ABSOLUTE: 7.5 K/UL (ref 1.4–6.5)
NEUTROPHILS RELATIVE PERCENT: 79 %
NUCLEATED RED BLOOD CELLS: 1 /100 WBC
PDW BLD-RTO: 15.5 % (ref 11.5–14.5)
PLATELET # BLD: 130 K/UL (ref 130–400)
PLATELET SLIDE REVIEW: ABNORMAL
POIKILOCYTES: ABNORMAL
POTASSIUM SERPL-SCNC: 4.1 MEQ/L (ref 3.4–4.9)
PROTHROMBIN TIME: 12.8 SEC (ref 12.3–14.9)
RBC # BLD: 3.07 M/UL (ref 4.7–6.1)
SODIUM BLD-SCNC: 143 MEQ/L (ref 135–144)
TOTAL CK: 66 U/L (ref 0–190)
TOTAL PROTEIN: 6.4 G/DL (ref 6.3–8)
WBC # BLD: 8.4 K/UL (ref 4.8–10.8)

## 2019-10-15 PROCEDURE — 73552 X-RAY EXAM OF FEMUR 2/>: CPT

## 2019-10-15 PROCEDURE — 99284 EMERGENCY DEPT VISIT MOD MDM: CPT

## 2019-10-15 PROCEDURE — 6830039000 HC L3 TRAUMA ALERT

## 2019-10-15 PROCEDURE — 96374 THER/PROPH/DIAG INJ IV PUSH: CPT

## 2019-10-15 PROCEDURE — G0480 DRUG TEST DEF 1-7 CLASSES: HCPCS

## 2019-10-15 PROCEDURE — 85610 PROTHROMBIN TIME: CPT

## 2019-10-15 PROCEDURE — 80053 COMPREHEN METABOLIC PANEL: CPT

## 2019-10-15 PROCEDURE — 71046 X-RAY EXAM CHEST 2 VIEWS: CPT

## 2019-10-15 PROCEDURE — 82550 ASSAY OF CK (CPK): CPT

## 2019-10-15 PROCEDURE — 73030 X-RAY EXAM OF SHOULDER: CPT

## 2019-10-15 PROCEDURE — 85730 THROMBOPLASTIN TIME PARTIAL: CPT

## 2019-10-15 PROCEDURE — 99222 1ST HOSP IP/OBS MODERATE 55: CPT | Performed by: SURGERY

## 2019-10-15 PROCEDURE — 6360000002 HC RX W HCPCS: Performed by: STUDENT IN AN ORGANIZED HEALTH CARE EDUCATION/TRAINING PROGRAM

## 2019-10-15 PROCEDURE — 85025 COMPLETE CBC W/AUTO DIFF WBC: CPT

## 2019-10-15 PROCEDURE — 73502 X-RAY EXAM HIP UNI 2-3 VIEWS: CPT

## 2019-10-15 RX ORDER — HYDROCODONE BITARTRATE AND ACETAMINOPHEN 5; 325 MG/1; MG/1
2 TABLET ORAL EVERY 6 HOURS PRN
Qty: 12 TABLET | Refills: 0 | Status: SHIPPED | OUTPATIENT
Start: 2019-10-15 | End: 2019-10-18

## 2019-10-15 RX ORDER — FENTANYL CITRATE 50 UG/ML
50 INJECTION, SOLUTION INTRAMUSCULAR; INTRAVENOUS ONCE
Status: COMPLETED | OUTPATIENT
Start: 2019-10-15 | End: 2019-10-15

## 2019-10-15 RX ORDER — ACETAMINOPHEN 160 MG
1000 TABLET,DISINTEGRATING ORAL DAILY
COMMUNITY

## 2019-10-15 RX ADMIN — FENTANYL CITRATE 50 MCG: 50 INJECTION, SOLUTION INTRAMUSCULAR; INTRAVENOUS at 11:01

## 2019-10-15 ASSESSMENT — PAIN SCALES - GENERAL
PAINLEVEL_OUTOF10: 0
PAINLEVEL_OUTOF10: 10
PAINLEVEL_OUTOF10: 10

## 2019-10-15 ASSESSMENT — ENCOUNTER SYMPTOMS
CHEST TIGHTNESS: 0
TROUBLE SWALLOWING: 0
COUGH: 0
SINUS PRESSURE: 0
VOMITING: 0
ABDOMINAL PAIN: 0
DIARRHEA: 0
BACK PAIN: 0
SHORTNESS OF BREATH: 0

## 2019-10-15 ASSESSMENT — PAIN DESCRIPTION - LOCATION: LOCATION: SHOULDER

## 2019-10-15 ASSESSMENT — PAIN DESCRIPTION - ORIENTATION: ORIENTATION: LEFT

## 2019-11-13 LAB
ALT SERPL-CCNC: <5 U/L (ref 0–41)
ANION GAP SERPL CALCULATED.3IONS-SCNC: 15 MEQ/L (ref 9–15)
AST SERPL-CCNC: 14 U/L (ref 0–40)
BUN BLDV-MCNC: 16 MG/DL (ref 8–23)
CALCIUM SERPL-MCNC: 9.6 MG/DL (ref 8.5–9.9)
CHLORIDE BLD-SCNC: 100 MEQ/L (ref 95–107)
CHOLESTEROL, TOTAL: 198 MG/DL (ref 0–199)
CO2: 26 MEQ/L (ref 20–31)
CREAT SERPL-MCNC: 0.82 MG/DL (ref 0.7–1.2)
FOLATE: 4 NG/ML (ref 7.3–26.1)
GFR AFRICAN AMERICAN: >60
GFR NON-AFRICAN AMERICAN: >60
GLUCOSE BLD-MCNC: 92 MG/DL (ref 70–99)
HCT VFR BLD CALC: 39.3 % (ref 42–52)
HDLC SERPL-MCNC: 115 MG/DL (ref 40–59)
HEMOGLOBIN: 12.9 G/DL (ref 14–18)
IRON SATURATION: 48 % (ref 11–46)
IRON: 154 UG/DL (ref 59–158)
LDL CHOLESTEROL CALCULATED: 74 MG/DL (ref 0–129)
MCH RBC QN AUTO: 36.9 PG (ref 27–31.3)
MCHC RBC AUTO-ENTMCNC: 32.8 % (ref 33–37)
MCV RBC AUTO: 112.5 FL (ref 80–100)
PDW BLD-RTO: 15.6 % (ref 11.5–14.5)
PLATELET # BLD: 180 K/UL (ref 130–400)
POTASSIUM SERPL-SCNC: 4.2 MEQ/L (ref 3.4–4.9)
RBC # BLD: 3.49 M/UL (ref 4.7–6.1)
SODIUM BLD-SCNC: 141 MEQ/L (ref 135–144)
TOTAL IRON BINDING CAPACITY: 318 UG/DL (ref 178–450)
TRIGL SERPL-MCNC: 45 MG/DL (ref 0–150)
VITAMIN B-12: 330 PG/ML (ref 232–1245)
VITAMIN D 25-HYDROXY: 41.6 NG/ML (ref 30–100)
WBC # BLD: 6.6 K/UL (ref 4.8–10.8)

## 2019-11-22 ENCOUNTER — HOSPITAL ENCOUNTER (OUTPATIENT)
Dept: NON INVASIVE DIAGNOSTICS | Age: 79
Discharge: HOME | End: 2019-11-22
Payer: MEDICARE

## 2019-11-22 LAB
LV EF: 60 %
LVEF MODALITY: NORMAL

## 2019-11-22 PROCEDURE — 93306 TTE W/DOPPLER COMPLETE: CPT

## 2020-06-29 ENCOUNTER — APPOINTMENT (OUTPATIENT)
Dept: CT IMAGING | Age: 80
DRG: 074 | End: 2020-06-29
Payer: MEDICARE

## 2020-06-29 ENCOUNTER — APPOINTMENT (OUTPATIENT)
Dept: GENERAL RADIOLOGY | Age: 80
DRG: 074 | End: 2020-06-29
Payer: MEDICARE

## 2020-06-29 ENCOUNTER — APPOINTMENT (OUTPATIENT)
Dept: ULTRASOUND IMAGING | Age: 80
DRG: 074 | End: 2020-06-29
Payer: MEDICARE

## 2020-06-29 ENCOUNTER — HOSPITAL ENCOUNTER (INPATIENT)
Age: 80
LOS: 2 days | Discharge: NURSING, REHAB OR LONG-TERM CARE | DRG: 074 | End: 2020-07-01
Attending: EMERGENCY MEDICINE | Admitting: INTERNAL MEDICINE
Payer: MEDICARE

## 2020-06-29 PROBLEM — R55 SYNCOPE AND COLLAPSE: Status: ACTIVE | Noted: 2020-06-29

## 2020-06-29 PROBLEM — S32.009A CLOSED FRACTURE OF BODY OF LUMBAR VERTEBRA (HCC): Status: ACTIVE | Noted: 2020-06-29

## 2020-06-29 LAB
ALBUMIN SERPL-MCNC: 3.7 G/DL (ref 3.5–4.6)
ALP BLD-CCNC: 37 U/L (ref 35–104)
ALT SERPL-CCNC: 9 U/L (ref 0–41)
ANION GAP SERPL CALCULATED.3IONS-SCNC: 23 MEQ/L (ref 9–15)
ANISOCYTOSIS: ABNORMAL
AST SERPL-CCNC: 20 U/L (ref 0–40)
ATYPICAL LYMPHOCYTE RELATIVE PERCENT: 3 %
BANDED NEUTROPHILS RELATIVE PERCENT: 9 %
BASOPHILS ABSOLUTE: 0.1 K/UL (ref 0–0.2)
BASOPHILS RELATIVE PERCENT: 1 %
BETA-HYDROXYBUTYRATE: 15.5 MG/DL (ref 0.2–2.8)
BILIRUB SERPL-MCNC: 0.5 MG/DL (ref 0.2–0.7)
BUN BLDV-MCNC: 18 MG/DL (ref 8–23)
CALCIUM SERPL-MCNC: 8.7 MG/DL (ref 8.5–9.9)
CHLORIDE BLD-SCNC: 102 MEQ/L (ref 95–107)
CO2: 17 MEQ/L (ref 20–31)
CREAT SERPL-MCNC: 0.98 MG/DL (ref 0.7–1.2)
EKG ATRIAL RATE: 70 BPM
EKG P AXIS: 12 DEGREES
EKG P-R INTERVAL: 138 MS
EKG Q-T INTERVAL: 434 MS
EKG QRS DURATION: 92 MS
EKG QTC CALCULATION (BAZETT): 468 MS
EKG R AXIS: 50 DEGREES
EKG T AXIS: 38 DEGREES
EKG VENTRICULAR RATE: 70 BPM
EOSINOPHILS ABSOLUTE: 0 K/UL (ref 0–0.7)
EOSINOPHILS RELATIVE PERCENT: 1 %
FOLATE: 2.6 NG/ML (ref 7.3–26.1)
GFR AFRICAN AMERICAN: >60
GFR NON-AFRICAN AMERICAN: >60
GLOBULIN: 2.4 G/DL (ref 2.3–3.5)
GLUCOSE BLD-MCNC: 133 MG/DL (ref 70–99)
HCT VFR BLD CALC: 32.2 % (ref 42–52)
HEMOGLOBIN: 10.4 G/DL (ref 14–18)
LACTIC ACID: 2.1 MMOL/L (ref 0.5–2.2)
LACTIC ACID: 3.5 MMOL/L (ref 0.5–2.2)
LYMPHOCYTES ABSOLUTE: 2.3 K/UL (ref 1–4.8)
LYMPHOCYTES RELATIVE PERCENT: 16 %
MACROCYTES: ABNORMAL
MAGNESIUM: 1.7 MG/DL (ref 1.7–2.4)
MCH RBC QN AUTO: 36.3 PG (ref 27–31.3)
MCHC RBC AUTO-ENTMCNC: 32.2 % (ref 33–37)
MCV RBC AUTO: 112.7 FL (ref 80–100)
METAMYELOCYTES RELATIVE PERCENT: 1 %
MONOCYTES ABSOLUTE: 0.7 K/UL (ref 0.2–0.8)
MONOCYTES RELATIVE PERCENT: 5.9 %
MYELOCYTE PERCENT: 1 %
NEUTROPHILS ABSOLUTE: 9.2 K/UL (ref 1.4–6.5)
NEUTROPHILS RELATIVE PERCENT: 64 %
NUCLEATED RED BLOOD CELLS: 1 /100 WBC
PDW BLD-RTO: 16.3 % (ref 11.5–14.5)
PLATELET # BLD: 136 K/UL (ref 130–400)
PLATELET SLIDE REVIEW: ADEQUATE
POLYCHROMASIA: ABNORMAL
POTASSIUM SERPL-SCNC: 4.5 MEQ/L (ref 3.4–4.9)
RBC # BLD: 2.86 M/UL (ref 4.7–6.1)
SMUDGE CELLS: 1
SODIUM BLD-SCNC: 142 MEQ/L (ref 135–144)
TOTAL PROTEIN: 6.1 G/DL (ref 6.3–8)
TROPONIN: <0.01 NG/ML (ref 0–0.01)
VITAMIN B-12: 272 PG/ML (ref 232–1245)
WBC # BLD: 12.3 K/UL (ref 4.8–10.8)

## 2020-06-29 PROCEDURE — 87040 BLOOD CULTURE FOR BACTERIA: CPT

## 2020-06-29 PROCEDURE — 2580000003 HC RX 258: Performed by: PHYSICIAN ASSISTANT

## 2020-06-29 PROCEDURE — 84484 ASSAY OF TROPONIN QUANT: CPT

## 2020-06-29 PROCEDURE — 70450 CT HEAD/BRAIN W/O DYE: CPT

## 2020-06-29 PROCEDURE — 2580000003 HC RX 258: Performed by: INTERNAL MEDICINE

## 2020-06-29 PROCEDURE — 36415 COLL VENOUS BLD VENIPUNCTURE: CPT

## 2020-06-29 PROCEDURE — 72100 X-RAY EXAM L-S SPINE 2/3 VWS: CPT

## 2020-06-29 PROCEDURE — 82607 VITAMIN B-12: CPT

## 2020-06-29 PROCEDURE — 6370000000 HC RX 637 (ALT 250 FOR IP): Performed by: EMERGENCY MEDICINE

## 2020-06-29 PROCEDURE — 85025 COMPLETE CBC W/AUTO DIFF WBC: CPT

## 2020-06-29 PROCEDURE — 97167 OT EVAL HIGH COMPLEX 60 MIN: CPT

## 2020-06-29 PROCEDURE — 82746 ASSAY OF FOLIC ACID SERUM: CPT

## 2020-06-29 PROCEDURE — 99285 EMERGENCY DEPT VISIT HI MDM: CPT

## 2020-06-29 PROCEDURE — 71045 X-RAY EXAM CHEST 1 VIEW: CPT

## 2020-06-29 PROCEDURE — 93005 ELECTROCARDIOGRAM TRACING: CPT | Performed by: EMERGENCY MEDICINE

## 2020-06-29 PROCEDURE — 74177 CT ABD & PELVIS W/CONTRAST: CPT

## 2020-06-29 PROCEDURE — 1210000000 HC MED SURG R&B

## 2020-06-29 PROCEDURE — 80053 COMPREHEN METABOLIC PANEL: CPT

## 2020-06-29 PROCEDURE — 6360000004 HC RX CONTRAST MEDICATION: Performed by: EMERGENCY MEDICINE

## 2020-06-29 PROCEDURE — 83605 ASSAY OF LACTIC ACID: CPT

## 2020-06-29 PROCEDURE — 82010 KETONE BODYS QUAN: CPT

## 2020-06-29 PROCEDURE — 6370000000 HC RX 637 (ALT 250 FOR IP): Performed by: INTERNAL MEDICINE

## 2020-06-29 PROCEDURE — 93010 ELECTROCARDIOGRAM REPORT: CPT | Performed by: INTERNAL MEDICINE

## 2020-06-29 PROCEDURE — 93880 EXTRACRANIAL BILAT STUDY: CPT

## 2020-06-29 PROCEDURE — 83735 ASSAY OF MAGNESIUM: CPT

## 2020-06-29 PROCEDURE — 71260 CT THORAX DX C+: CPT

## 2020-06-29 RX ORDER — CHOLECALCIFEROL (VITAMIN D3) 125 MCG
1000 CAPSULE ORAL DAILY
Status: DISCONTINUED | OUTPATIENT
Start: 2020-06-30 | End: 2020-07-01 | Stop reason: HOSPADM

## 2020-06-29 RX ORDER — 0.9 % SODIUM CHLORIDE 0.9 %
1000 INTRAVENOUS SOLUTION INTRAVENOUS ONCE
Status: COMPLETED | OUTPATIENT
Start: 2020-06-29 | End: 2020-06-29

## 2020-06-29 RX ORDER — PROMETHAZINE HYDROCHLORIDE 12.5 MG/1
12.5 TABLET ORAL EVERY 6 HOURS PRN
Status: DISCONTINUED | OUTPATIENT
Start: 2020-06-29 | End: 2020-07-01 | Stop reason: HOSPADM

## 2020-06-29 RX ORDER — DEXTROSE AND SODIUM CHLORIDE 5; .9 G/100ML; G/100ML
INJECTION, SOLUTION INTRAVENOUS CONTINUOUS
Status: DISPENSED | OUTPATIENT
Start: 2020-06-29 | End: 2020-06-30

## 2020-06-29 RX ORDER — ACETAMINOPHEN 650 MG/1
650 SUPPOSITORY RECTAL EVERY 6 HOURS PRN
Status: DISCONTINUED | OUTPATIENT
Start: 2020-06-29 | End: 2020-07-01 | Stop reason: HOSPADM

## 2020-06-29 RX ORDER — VITAMIN B COMPLEX
1000 TABLET ORAL DAILY
Status: DISCONTINUED | OUTPATIENT
Start: 2020-06-30 | End: 2020-07-01 | Stop reason: HOSPADM

## 2020-06-29 RX ORDER — MECLIZINE HYDROCHLORIDE 25 MG/1
25 TABLET ORAL ONCE
Status: COMPLETED | OUTPATIENT
Start: 2020-06-29 | End: 2020-06-29

## 2020-06-29 RX ORDER — SODIUM CHLORIDE 0.9 % (FLUSH) 0.9 %
10 SYRINGE (ML) INJECTION PRN
Status: DISCONTINUED | OUTPATIENT
Start: 2020-06-29 | End: 2020-07-01 | Stop reason: HOSPADM

## 2020-06-29 RX ORDER — ONDANSETRON 2 MG/ML
4 INJECTION INTRAMUSCULAR; INTRAVENOUS EVERY 6 HOURS PRN
Status: DISCONTINUED | OUTPATIENT
Start: 2020-06-29 | End: 2020-07-01 | Stop reason: HOSPADM

## 2020-06-29 RX ORDER — POLYETHYLENE GLYCOL 3350 17 G/17G
17 POWDER, FOR SOLUTION ORAL DAILY PRN
Status: DISCONTINUED | OUTPATIENT
Start: 2020-06-29 | End: 2020-07-01 | Stop reason: HOSPADM

## 2020-06-29 RX ORDER — FOLIC ACID 1 MG/1
1000 TABLET ORAL DAILY
Status: DISCONTINUED | OUTPATIENT
Start: 2020-06-29 | End: 2020-07-01 | Stop reason: HOSPADM

## 2020-06-29 RX ORDER — LATANOPROST 50 UG/ML
1 SOLUTION/ DROPS OPHTHALMIC NIGHTLY
Status: DISCONTINUED | OUTPATIENT
Start: 2020-06-29 | End: 2020-07-01 | Stop reason: HOSPADM

## 2020-06-29 RX ORDER — BRIMONIDINE TARTRATE 2 MG/ML
1 SOLUTION/ DROPS OPHTHALMIC 2 TIMES DAILY
Status: DISCONTINUED | OUTPATIENT
Start: 2020-06-29 | End: 2020-07-01 | Stop reason: HOSPADM

## 2020-06-29 RX ORDER — SODIUM CHLORIDE 0.9 % (FLUSH) 0.9 %
10 SYRINGE (ML) INJECTION EVERY 12 HOURS SCHEDULED
Status: DISCONTINUED | OUTPATIENT
Start: 2020-06-29 | End: 2020-07-01 | Stop reason: HOSPADM

## 2020-06-29 RX ORDER — ACETAMINOPHEN 325 MG/1
650 TABLET ORAL EVERY 6 HOURS PRN
Status: DISCONTINUED | OUTPATIENT
Start: 2020-06-29 | End: 2020-07-01 | Stop reason: HOSPADM

## 2020-06-29 RX ORDER — UREA 10 %
800 LOTION (ML) TOPICAL DAILY
Status: DISCONTINUED | OUTPATIENT
Start: 2020-06-29 | End: 2020-06-29 | Stop reason: CLARIF

## 2020-06-29 RX ADMIN — DEXTROSE AND SODIUM CHLORIDE: 5; 900 INJECTION, SOLUTION INTRAVENOUS at 21:12

## 2020-06-29 RX ADMIN — CARBIDOPA AND LEVODOPA 1 TABLET: 25; 100 TABLET ORAL at 21:10

## 2020-06-29 RX ADMIN — IOPAMIDOL 100 ML: 612 INJECTION, SOLUTION INTRAVENOUS at 11:42

## 2020-06-29 RX ADMIN — FOLIC ACID 1000 MCG: 1 TABLET ORAL at 21:11

## 2020-06-29 RX ADMIN — SODIUM CHLORIDE 1000 ML: 9 INJECTION, SOLUTION INTRAVENOUS at 18:27

## 2020-06-29 RX ADMIN — LATANOPROST 1 DROP: 50 SOLUTION OPHTHALMIC at 21:15

## 2020-06-29 RX ADMIN — MECLIZINE HYDROCHLORIDE 25 MG: 25 TABLET ORAL at 13:21

## 2020-06-29 RX ADMIN — Medication 10 ML: at 21:16

## 2020-06-29 RX ADMIN — BRIMONIDINE TARTRATE 1 DROP: 2 SOLUTION OPHTHALMIC at 21:15

## 2020-06-29 ASSESSMENT — PAIN DESCRIPTION - LOCATION
LOCATION: BACK
LOCATION: RIB CAGE

## 2020-06-29 ASSESSMENT — PAIN SCALES - GENERAL
PAINLEVEL_OUTOF10: 0
PAINLEVEL_OUTOF10: 0
PAINLEVEL_OUTOF10: 2
PAINLEVEL_OUTOF10: 0
PAINLEVEL_OUTOF10: 7

## 2020-06-29 ASSESSMENT — PAIN DESCRIPTION - FREQUENCY: FREQUENCY: CONTINUOUS

## 2020-06-29 ASSESSMENT — PAIN DESCRIPTION - DESCRIPTORS: DESCRIPTORS: DISCOMFORT;SHOOTING

## 2020-06-29 ASSESSMENT — PAIN DESCRIPTION - PAIN TYPE
TYPE: ACUTE PAIN
TYPE: ACUTE PAIN

## 2020-06-29 ASSESSMENT — PAIN DESCRIPTION - ORIENTATION: ORIENTATION: RIGHT;LOWER

## 2020-06-29 NOTE — FLOWSHEET NOTE
1355--Patient arrived to unit room 222, assessment complete, patient awake and alert; oriented to person, place, and time, patient denies pain, no nausea and no vomiting, no cp or sob, oriented to room and call system, call light in reach, bed in lowest position, bed alarm engaged, will continue to monitor. 1915-- orthostatic vitals attempted, patient unable to do standing portion due to dizziness, orthostatic vitals were 99/54 and heart rate of 88 for laying, and sitting 76/46 and heart rate of 98.

## 2020-06-29 NOTE — ED NOTES
Bed: 19  Expected date: 6/29/20  Expected time:   Means of arrival:   Comments:  [de-identified] m syncope      Avery Vasquez RN  06/29/20 9182

## 2020-06-29 NOTE — CONSULTS
Neurosurgical consultation for this 79-year-old male who was admitted to the hospital because of worsening of his consents disease and an episode of syncope. When he fell his right buttock hit a dresser. He has pain in his right posterior ribs and right flank. Most of his pain is in his right buttock. Flank pain is not too bad and his ribs have gotten better. He is convinced that the pain is from a chip. Multiple comorbidities including aortic valve stenosis history of cancer Parkinson's disease macular degeneration. Patient is awake alert responsive talks in a monotone voice but answers appropriately. He is very clear that most of his pain is in his right buttock. He does not have any back rib or flank pain at this time. Physical therapy is with him and he had a hard time getting up out of bed. He became lightheaded and dizzy and had to lay back down. Exam shows he has severe ecchymoses and swelling with extreme tenderness over the tear aspect of his right buttock. Gentle pressure in this area re-creates almost all of his pain. He does not have any central back or lumbar pain no pain to percussion of the spine lumbar thoracic. Range of motion of both hips were intact with tenderness only on the right side in the posterior aspect of the buttock. Good range of motion the knees he is stiff he does have Parkinson's disease with dyskinesias. Review of his studies CT of the abdomen pelvis shows ossification the anterior aspect of L2 read as a chip fracture. This should be confirmed by x-ray. It may not be a fracture but an ossification. Treatment is the same whether or not it is a true fracture. Time and mobilize as his pain allows. He may benefit from an ice pack on the swelling and bruising of the right buttock. Impression fall with soft tissue injury right buttock and bursa. 2.  Progressive Parkinson's disease. 3.  Syncopal episodes.

## 2020-06-29 NOTE — H&P
Hospital Medicine History & Physical      PCP: Wilver Blake MD    Date of Admission: 6/29/2020    Date of Service: 6/29/20      Chief Complaint:  syncope      History Of Present Illness:  [de-identified] y.o. male who presented to [facility] ED for complaints of feeling faint and dizzy with any change in position which started 90 minutes prior to coming to the ED. The patient states that he became dizzy and fell at home hitting his right posterior ribs and flank on a dresser. The patient denied any previous symptoms like this. The patient denies LOC. EMS was called and the patient had a second episode witnessed while sitting up. The patient laid back and had a rapid recovery. While in the ED the patient was being wheeled in a while chair. He suddenly became alarmed and felt like he was falling.      Past Medical History:          Diagnosis Date    Actinic keratoses     Aortic valve stenosis, severe 9/20/2013    BPH (benign prostatic hyperplasia)     Cancer ([facility] [address] 04/2018    skin cancer on chest removed    Colon polyp 78/03/2769    Diastolic dysfunction 73/41/8096    Stage 2 by echo    ED (erectile dysfunction) 11/29/2011    Glaucoma     Dr. Martir Samuels Hemochromatosis     History of echocardiogram 02/18/2015    mild AS, mild AR, mild TR, mild MR    [place]    Hypertension     Iron deficiency anemia 4/28/2014    LVH (left ventricular hypertrophy) 9/20/2013    Macular degeneration     bilateral; dry on left and wet on right-Dr. Chandler Purpura on back 5/14/2015    Osteoarthritis cervical spine     Parkinson disease ([facility] [address]     Dr. Ian Najera       Past Surgical History:          Procedure Laterality Date    BACK SURGERY      CARDIAC CATHETERIZATION  11/09/2017    [facility] MD    CATARACT REMOVAL      COLONOSCOPY  11/2009    single polyp right colon    EYE SURGERY Bilateral     OTHER SURGICAL HISTORY  06/08/15    EXC SUBCU MASS BACK    TONSILLECTOMY Medications Prior to Admission:      Prior to Admission medications    Medication Sig Start Date End Date Taking? Authorizing Provider   Cholecalciferol (VITAMIN D3) 2000 units CAPS Take 1,000 Units by mouth daily   Yes Historical Provider, MD   quinapril (ACCUPRIL) 20 MG tablet Take 1 tablet by mouth daily   Yes Historical Provider, MD   vitamin B-12 (CYANOCOBALAMIN) 100 MCG tablet Take 1 tablet by mouth daily   Yes Historical Provider, MD   Dorzolamide HCl-Timolol Mal (COSOPT OP) Apply 1 drop to eye 2 times daily   Yes Historical Provider, MD   folic acid (FOLVITE) 1 MG tablet Take 1 tablet by mouth daily   Yes Historical Provider, MD   carbidopa-levodopa (SINEMET)  MG per tablet Take 1 tablet by mouth 3 times daily   Yes Historical Provider, MD   valsartan (DIOVAN) 40 MG tablet Take 1/2 tab daily 10/30/18  Yes Oswaldo Lundberg MD   tamsulosin [facility]) 0.4 MG capsule Take 1 capsule by mouth daily 4/26/18  Yes Oswaldo Lundberg MD   mupirocin (BACTROBAN) 2 % ointment Apply topically 3 times daily. 4/25/18  Yes Oswaldo Lundberg MD   Omega-3 Fatty Acids (FISH OIL PO) Take by mouth   Yes Historical Provider, MD   brimonidine (ALPHAGAN P) 0.1 % SOLN Place 1 drop into both eyes 3 times daily. Yes Historical Provider, MD   bimatoprost (LUMIGAN) 0.03 % ophthalmic drops Place 1 drop into the left eye nightly. Yes Historical Provider, MD       Allergies:  Cat hair extract    Social History:      The patient currently lives home    TOBACCO:   reports that he has never smoked. He has never used smokeless tobacco.  ETOH:   reports current alcohol use. Family History:       Positive as follows: HTN        REVIEW OF SYSTEMS:   Pertinent positives as noted in the HPI. All other systems reviewed and negative. PHYSICAL EXAM:    /79   Pulse 72   Temp 97.9 °F (36.6 °C) (Oral)   Resp 16   SpO2 98%     General appearance:  No apparent distress, appears stated age and cooperative.   HEENT:  Normal cephalic, atraumatic without obvious deformity. Pupils equal, round, and reactive to light. Extra ocular muscles intact. Conjunctivae/corneas clear. Neck: Supple, with full range of motion. No jugular venous distention. Trachea midline. Respiratory:  Normal respiratory effort. Clear to auscultation, bilaterally without Rales/Wheezes/Rhonchi. Cardiovascular:  Regular rate and rhythm with normal S1/S2 without murmurs, rubs or gallops. Abdomen: Soft, non-tender, non-distended with normal bowel sounds. Musculoskeletal:  No clubbing, cyanosis or edema bilaterally. Full range of motion without deformity. Skin: Skin color, texture, turgor normal.  No rashes or lesions. Neurologic:  Neurovascularly intact without any focal sensory/motor deficits. Cranial nerves: II-XII intact, grossly non-focal.  Psychiatric:  Alert and oriented, thought content appropriate, normal insight  Capillary Refill: Brisk,< 3 seconds   Peripheral Pulses: +2 palpable, equal bilaterally       Labs:     Recent Labs     06/29/20  1109   WBC 12.3*   HGB 10.4*   HCT 32.2*        Recent Labs     06/29/20  1100      K 4.5      CO2 17*   BUN 18   CREATININE 0.98   CALCIUM 8.7     Recent Labs     06/29/20  1100   AST 20   ALT 9   BILITOT 0.5   ALKPHOS 37     No results for input(s): INR in the last 72 hours. Recent Labs     06/29/20  1100   TROPONINI <0.010       Urinalysis:      Lab Results   Component Value Date    NITRU Negative 07/14/2017    WBCUA 0-2 07/14/2017    BACTERIA Few 04/26/2017    RBCUA 0-2 07/14/2017    BLOODU Negative 07/14/2017    SPECGRAV 1.021 07/14/2017    GLUCOSEU Negative 07/14/2017    GLUCOSEU NEG 12/01/2011       Radiology:     CXR: I have reviewed the CXR with the following interpretation: pending  EKG:  I have reviewed the EKG with the following interpretation: pending    CT HEAD WO CONTRAST   Final Result   1. No CT evidence of acute intracranial abnormality, as questioned.    2. Moderate to moderately severe cerebral volume loss/atrophy with white matter changes consistent with sequelae small vessel ischemic disease. .   3. Vascular calcifications within the bilateral internal carotid artery cavernous segments and the vertebral arteries. .   4. Empty sella. CT CHEST W CONTRAST   Final Result   1. NO ACUTE TRAUMATIC INJURY TO THE CHEST AS DESCRIBED IN BODY OF REPORT. 2. MEDIASTINAL STRUCTURES AND THORACIC AORTA INTACT. NO MEDIASTINAL HEMATOMA. 3. LUNGS APPEAR EXPANDED AND CLEAR WITHOUT A PULMONARY CONTUSION OR PNEUMOTHORAX. 4. DEGENERATIVE BONE SPURRING IN THE THORACIC SPINE AND NO THORACIC SPINE FRACTURE. 5. RIBS APPEAR INTACT AND NO CT EVIDENCE OF A DISPLACED RIB FRACTURE. All CT scans at this facility use dose modulation, iterative reconstruction, and/or weight based dosing when appropriate to reduce radiation dose to as low as reasonably achievable. CT ABDOMEN PELVIS W IV CONTRAST Additional Contrast? None   Final Result   1. SOFT TISSUE CONTUSION INVOLVING THE RIGHT POSTEROLATERAL ABDOMINAL WALL. 2. 10 X 5 X 10 CM RIGHT POSTERIOR ABDOMINAL WALL HEMATOMA WITH L2 CHIP FRACTURE. 3. NO INTRAPERITONEAL OR RETROPERITONEAL HEMORRHAGE OR HEMATOMA. 4. NO ABDOMINAL ORGAN TRAUMATIC INJURY OR ABDOMINAL ORGAN LACERATION. 5. DEGENERATIVE BONE CHANGES IN THE LUMBAR SPINE AND NO LUMBAR SPINE FRACTURE. 6. NO PELVIC FRACTURE AND ADDITIONAL INCIDENTAL FINDINGS IN BODY OF REPORT. All CT scans at this facility use dose modulation, iterative reconstruction, and/or weight based dosing when appropriate to reduce radiation dose to as low as reasonably achievable. XR CHEST PORTABLE   Final Result      No acute radiographic abnormality. ASSESSMENT:    Active Hospital Problems    Diagnosis Date Noted    Syncope and collapse [R55] 06/29/2020       PLAN:        DVT Prophylaxis: lovenox  Diet: No diet orders on file  Code Status: No Order    PT/OT Eval Status: eval and tx    1. Syncope and collapse-will admit and consult Dr. Edinson Norman. Will obtain carotid us, 2D Echo and mri/mra of the brain  2. Vertigo-will medicate with meclizine  3  Blunt trauma to chest  4. Closed fracture of secondary lumbar vertebra, initial encounter-will consult neurosurgery  5. Hematoma  6. Leukocytosis-will obtain U/A C&S       JASON Mcdonald    Thank you Becca Albarado MD for the opportunity to be involved in this patient's care. If you have any questions or concerns please feel free to contact me.

## 2020-06-29 NOTE — PROGRESS NOTES
Spiritual Care Services     Summary of Visit:      Spiritual Assessment/Intervention/Outcomes:    Encounter Summary  Services provided to[de-identified] Patient  Referral/Consult From[de-identified] Nurse  Support System: Spouse  Continue Visiting: No  Complexity of Encounter: Moderate  Length of Encounter: 15 minutes  Advance Care Planning: Yes                 Advance Directives (For Healthcare)  Healthcare Directive: No, patient does not have an advance directive for healthcare treatment  Information on Healthcare Directives Requested: Yes  Patient Requests Assistance: Yes, referral made to   Advance Directives: Documents given, Documents explained           Values / Beliefs  Do you have any ethnic, cultural, sacramental, or spiritual [employer] needs you would like us to be aware of while you are in the hospital?: No    Care Plan:        [id number]   Electronically signed by Rudolph Veronica on 6/29/20 at 3:55 PM EDT     To reach a  for emotional and spiritual support, place an [facility] consult request.   If a  is needed immediately, dial 0 and ask to page the on-call .

## 2020-06-29 NOTE — ACP (ADVANCE CARE PLANNING)
Advance Care Planning     Non-Provider Advance Care Planning (ACP) Note    Date of ACP Conversation: 6/29/2020  Persons included in Conversation: patient  Length of ACP Conversation in minutes: 20 minutes    Conversation requested by:   Patient    Authorized Decision Maker (if patient is incapable of making informed decisions):    This person is:  Next of Kin by law (only applies in absence of above): Patient's wife        General ACP for ALL Patients with Decision Making Capacity:    Advance Directive Conversation with Patients who have not yet planned:  Recommended additional conversation with prospective agent    Review of Existing Advance Directive: (Select questions covered)  Patient does not have advanced directives    Interventions Provided:  Recommended completion of Advance Directive after review of materials, conversation with prospective healthcare agent about (and others as needed), and readiness to complete a written plan

## 2020-06-29 NOTE — ED NOTES
Went to take patient to restroom via  Wheelchair   While pushing patient in the chair  He yelled out and grabbed my arm   Said he felt like he was falling      Notified   Dr Anisa Orlando received for [employer] Jakub Tejada RN  06/29/20

## 2020-06-29 NOTE — CARE COORDINATION
[facility] [place] Case Management Initial Discharge Assessment    Met with Patient to discuss discharge plan. PCP: Mariah Flores MD                                Date of Last Visit: Nov    If no PCP, list provided? N/A    Discharge Planning    Living Arrangements: independently at home    Who do you live with? wife    Who helps you with your care:  self    If lives at home:     Do you have any barriers navigating in your home? no    Patient can perform ADL? Yes    Current Services (outpatient and in home) :  None    Dialysis: No    Is transportation available to get to your appointments? Yes    DME Equipment:  yes - cane    Respiratory equipment: None    Respiratory provider:  no     Pharmacy:  yes - [facility]    Consult with Medication Assistance Program?  No        Does Patient Have a High-Risk for Readmission Diagnosis (CHF, PN, MI, COPD)? H/o diastolic dysfunction with lvh      Initial Discharge Plan? (Note: please see concurrent daily documentation for any updates after initial note). CM to assess for further d/c needs and referrals.       Electronically signed by Britney Bravo on 6/29/2020 at 4:09 PM

## 2020-06-29 NOTE — PROGRESS NOTES
Occupational Therapy  [facility] OCCUPATIONAL THERAPY EVALUATION - ACUTE     NAME: Nikolas Minor  : 1940 ([de-identified] y.o.)  MRN: [id number]  CODE STATUS: Full Code  Room: B923/K185-05    Date of Service: 2020    Patient Diagnosis(es): Syncope and collapse [R55]   Chief Complaint   Patient presents with    Loss of Consciousness     no fall or trauma       Patient Active Problem List    Diagnosis Date Noted    Syncope and collapse 2020    Closed fracture of body of lumbar vertebra ([address] 2020    MDS (myelodysplastic syndrome), low grade ([address] 2019    Parkinson disease ([address]     Macular degeneration     Glaucoma     BPH (benign prostatic hyperplasia)     Anemia 2014    Aortic valve stenosis, severe 2013    LVH (left ventricular hypertrophy)     Diastolic dysfunction     Hypertension 2011        Past Medical History:   Diagnosis Date    Actinic keratoses     Aortic valve stenosis, severe 2013    BPH (benign prostatic hyperplasia)     Cancer ([address] 2018    skin cancer on chest removed    Colon polyp     Diastolic dysfunction     Stage 2 by echo    ED (erectile dysfunction) 2011    Glaucoma     Dr. Jaimes Both Hemochromatosis     History of echocardiogram 2015    mild AS, mild AR, mild TR, mild MR    [place]    Hypertension     Iron deficiency anemia 2014    LVH (left ventricular hypertrophy) 2013    Macular degeneration     bilateral; dry on left and wet on right-Dr. Christobal Duverney on back 2015    Osteoarthritis cervical spine     Parkinson disease ([address]     Dr. Nahid Bernal     Past Surgical History:   Procedure Laterality Date    BACK SURGERY      CARDIAC CATHETERIZATION  2017    [facility] TUSHAR ECHOLS MD    CATARACT REMOVAL      COLONOSCOPY  2009    single polyp right colon    EYE SURGERY Bilateral     OTHER SURGICAL HISTORY 06/08/15    EXC SUBCU MASS BACK    TONSILLECTOMY          Restrictions  Restrictions/Precautions: Fall Risk     Safety Devices: Safety Devices  Safety Devices in place: Yes  Type of devices: All fall risk precautions in place   Initially in place: No    Subjective  Pre Treatment Pain Screening  Pain at present: 10  Scale Used: Numeric Score  Intervention List: Patient able to continue with treatment, Patient declined any intervention  Comments / Details: pain increases with touch or movement    Pain Reassessment:   Pain Assessment  Patient Currently in Pain: Yes  Pain Assessment: 0-10  Pain Level: 2  Pain Type: Acute pain  Pain Location: Back  Pain Orientation: Right, Lower  Pain Descriptors: Discomfort, Shooting  Pain Frequency: Continuous       Prior Level of Function:  Social/Functional History  Lives With: Spouse  Type of Home: House  Home Layout: Two level  Bathroom Shower/Tub: Walk-in shower  Home Equipment: Rolling walker, Cane  Receives Help From: Family  ADL Assistance: Independent  Homemaking Assistance: Independent  Homemaking Responsibilities: Yes  Ambulation Assistance: Independent  Transfer Assistance: Independent  Active : No  Occupation: Retired  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[employer], estate sales, get together with friends    OBJECTIVE:     Orientation Status:  Orientation  Overall Orientation Status: Within Functional Limits    Observation:  Observation/Palpation  Posture: Fair  Observation: laying supine in bed with HOB elevated, pleasant and cooperative, anxious  Edema: R lower back large hematoma    Cognition Status:  Cognition  Overall Cognitive Status: Exceptions  Arousal/Alertness: Appropriate responses to stimuli  Following Commands:  Follows all commands without difficulty  Attention Span: Appears intact  Memory: Appears intact  Safety Judgement: Good awareness of safety precautions  Problem Solving: Able to problem solve independently  Insights: Fully aware of deficits  Initiation: Requires cues for some  Sequencing: Requires cues for some  Cognition Comment: Comp Mod I, Express Independent, Social- Independent, Problem- Mod I, Memory- Independent    Perception Status:  Perception  Overall Perceptual Status: [facility]    Sensation Status:  Sensation  Overall Sensation Status: [facility]    Vision and Hearing Status:  Vision  Vision: Impaired  Vision Exceptions: Wears glasses for reading  Hearing  Hearing: Within functional limits     ROM:   LUE AROM (degrees)  LUE AROM : WFL  Left Hand AROM (degrees)  Left Hand AROM: WFL  RUE AROM (degrees)  RUE AROM : WFL  Right Hand AROM (degrees)  Right Hand AROM: WFL    Strength:  LUE Strength  Gross LUE Strength: Exceptions to [facility]  L Hand General: 3/5  LUE Strength Comment: 3/5 all planes  RUE Strength  Gross RUE Strength: Exceptions to [facility]  R Hand General: 3/5  RUE Strength Comment: 3/5 all planes    Coordination, Tone, Quality of Movement: Tone RUE  RUE Tone: Normotonic  Tone LUE  LUE Tone: Normotonic  Coordination  Movements Are Fluid And Coordinated: No  Coordination and Movement description: Fine motor impairments, Decreased accuracy, Right UE, Left UE    Hand Dominance:  Hand Dominance  Hand Dominance: Right    ADL Status:  ADL  Feeding: Setup  Grooming: Setup  UE Bathing: Setup  LE Bathing: Moderate assistance  UE Dressing: Setup  LE Dressing: Moderate assistance  Toileting:  Moderate assistance  Additional Comments: Simulated all ADLs as above  Toilet Transfers  Toilet Transfer: Unable to assess  Toilet Transfers Comments: anticipate min-CGA       Therapy key for assistance levels -   Independent = Pt. is able to perform task with no assistance but may require a device   Stand by assistance = Pt. does not perform task at an independent level but does not need physical assistance, requires verbal cues  Minimal, Moderate, Maximal Assistance = Pt. requires physical assistance (25%, 50%, 75% assist from helper) for task but is able to actively participate in task   Dependent = Pt. requires total assistance with task and is not able to actively participate with task completion     Functional Mobility:  Functional Mobility  Functional Mobility Comments: unsafe to attempt, pt nauseaus  Transfers  Sit to stand: Unable to assess  Stand to sit: Unable to assess  Transfer Comments: unsafe to attempt d/t nausea    Bed Mobility  Bed mobility  Supine to Sit: Stand by assistance(using bed rails)  Sit to Supine: Minimal assistance    Seated and Standing Balance:  Balance  Sitting Balance: Contact guard assistance  Standing Balance: Unable to assess(comment)(anticipate min-CGA)    Functional Endurance:  Activity Tolerance  Activity Tolerance: Patient Tolerated treatment well  Activity Tolerance: fair+    D/C Recommendations:  OT D/C RECOMMENDATIONS  REQUIRES OT FOLLOW UP: Yes    Equipment Recommendations:   continue to assess    OT Education:   OT Education  OT Education: OT Role, Plan of Care  Barriers to Learning: none     OT Follow Up:  OT D/C RECOMMENDATIONS  REQUIRES OT FOLLOW UP: Yes       Assessment/Discharge Disposition:  Assessment: Pt is an [de-identified] y/o male from home with spouse who presents to [facility] with the above deficits which impact his independence and safety during ADLs and IADLs. Pt would benefit from OT services to maximize safety during self care.   Performance deficits / Impairments: Decreased functional mobility , Decreased strength, Decreased endurance, Decreased coordination, Decreased ADL status, Decreased high-level IADLs, Decreased fine motor control, Decreased balance, Decreased cognition  Prognosis: Good  Discharge Recommendations: Continue to assess pending progress  Decision Making: High Complexity  History: Pt's medical history is complex  Exam: 9 perf deficits  Assistance / Modification: mod A    Six Click Score   How much help for putting on and taking off regular lower body clothing?: A Lot  How much help for Bathing?: A Lot  How much help for Toileting?: A Lot  How much help for putting on and taking off regular upper body clothing?: A Little  How much help for taking care of personal grooming?: A Little  How much help for eating meals?: A Little  AM-PAC Inpatient Daily Activity Raw Score: 15  AM-PAC Inpatient ADL T-Scale Score : 34.69  ADL Inpatient CMS 0-100% Score: 56.46    Plan:  Plan  Times per week: 1-3x  Times per day: Daily  Plan weeks: length of acute stay  Current Treatment Recommendations: Strengthening, Functional Mobility Training, Patient/Caregiver Education & Training, Home Management Training, Cognitive/Perceptual Training, Pain Management, Balance Training, Neuromuscular Re-education, Self-Care / ADL    Goals:   Patient will:    - Improve functional endurance to tolerate/complete 60 mins of ADL's  - Be Mod I in UB ADLs   - Be Mod I in LB ADLs  - Be Mod I in ADL transfers without LOB  - Be Mod I in toileting tasks  - Improve B hand fine motor coordination to [facility] in order to manage clothing fasteners/self-care containers in a timely manner  - Improve B UE strength and endurance to 4/5 in order to participate in self-care activities as projected. - Access appropriate D/C site with as few architectural barriers as possible.   - Sequence self-care tasks with no verbal cues    Patient Goal: Patient goals : \"to improve my balance\"     Discussed and agreed upon: Yes Comments:     Therapy Time:   OT Individual Minutes  Time In: 4498  Time Out: 1615  Minutes: 27    Eval: 27 minutes     Electronically signed by:    JAYCE Tineo  6/29/2020, 4:35 PM

## 2020-06-29 NOTE — ED PROVIDER NOTES
HPI:  6/29/20, Time: 10:53 AM EDT         Shala Shelby is a [de-identified] y.o. male presenting to the ED for syncope, beginning 90 minutes ago. The complaint has been intermittent, moderate in severity, and worsened by changing position. Patient hit a dresser with this first syncopal episode. He describes pain in the right posterior ribs and right flank. There was no chest pain no shortness of breath. He had no symptoms prior to the syncopal episode after it he was diaphoretic. No nausea. He has had a second episode witnessed by EMS while he was sitting up. When he was laid supine he had a rapid recovery there was no incontinence and no confusion afterwards    ROS:   Pertinent positives and negatives are stated within HPI, all other systems reviewed and are negative.  --------------------------------------------- PAST HISTORY ---------------------------------------------  Past Medical History:  has a past medical history of Actinic keratoses, Aortic valve stenosis, severe, BPH (benign prostatic hyperplasia), [address] [address] Colon polyp, Diastolic dysfunction, ED (erectile dysfunction), Glaucoma, Hemochromatosis, History of echocardiogram, History of echocardiogram, Hypertension, Iron deficiency anemia, LVH (left ventricular hypertrophy), Macular degeneration, Mass on back, Osteoarthritis cervical spine, and Parkinson disease ([facility] [address] Past Surgical History:  has a past surgical history that includes Colonoscopy (11/2009); back surgery; Cataract removal; eye surgery (Bilateral); Tonsillectomy; other surgical history (06/08/15); and Cardiac catheterization (11/09/2017). Social History:  reports that he has never smoked. He has never used smokeless tobacco. He reports current alcohol use. He reports that he does not use drugs. Family History: family history is not on file. The patients home medications have been reviewed.     Allergies: Cat hair 32.2 (L) 33.0 - 37.0 %    RDW 16.3 (H) 11.5 - 14.5 %    Platelets 401 490 - 204 K/uL   Comprehensive Metabolic Panel   Result Value Ref Range    Sodium 142 135 - 144 mEq/L    Potassium 4.5 3.4 - 4.9 mEq/L    Chloride 102 95 - 107 mEq/L    CO2 17 (L) 20 - 31 mEq/L    Anion Gap 23 (H) 9 - 15 mEq/L    Glucose 133 (H) 70 - 99 mg/dL    BUN 18 8 - 23 mg/dL    CREATININE 0.98 0.70 - 1.20 mg/dL    GFR Non-African American >60.0 >60    GFR  >60.0 >60    Calcium 8.7 8.5 - 9.9 mg/dL    Total Protein 6.1 (L) 6.3 - 8.0 g/dL    Alb 3.7 3.5 - 4.6 g/dL    Total Bilirubin 0.5 0.2 - 0.7 mg/dL    Alkaline Phosphatase 37 35 - 104 U/L    ALT 9 0 - 41 U/L    AST 20 0 - 40 U/L    Globulin 2.4 2.3 - 3.5 g/dL   Magnesium   Result Value Ref Range    Magnesium 1.7 1.7 - 2.4 mg/dL   Troponin   Result Value Ref Range    Troponin <0.010 0.000 - 0.010 ng/mL   EKG 12 Lead   Result Value Ref Range    Ventricular Rate 70 BPM    Atrial Rate 70 BPM    P-R Interval 138 ms    QRS Duration 92 ms    Q-T Interval 434 ms    QTc Calculation (Bazett) 468 ms    P Axis 12 degrees    R Axis 50 degrees    T Axis 38 degrees       RADIOLOGY:  Interpreted by Radiologist.  CT HEAD WO CONTRAST   Final Result   1. No CT evidence of acute intracranial abnormality, as questioned. 2. Moderate to moderately severe cerebral volume loss/atrophy with white matter changes consistent with sequelae small vessel ischemic disease. .   3. Vascular calcifications within the bilateral internal carotid artery cavernous segments and the vertebral arteries. .   4. Empty sella. CT CHEST W CONTRAST   Final Result   1. NO ACUTE TRAUMATIC INJURY TO THE CHEST AS DESCRIBED IN BODY OF REPORT. 2. MEDIASTINAL STRUCTURES AND THORACIC AORTA INTACT. NO MEDIASTINAL HEMATOMA. 3. LUNGS APPEAR EXPANDED AND CLEAR WITHOUT A PULMONARY CONTUSION OR PNEUMOTHORAX. 4. DEGENERATIVE BONE SPURRING IN THE THORACIC SPINE AND NO THORACIC SPINE FRACTURE.    5. RIBS APPEAR INTACT AND NO CT EVIDENCE OF A DISPLACED RIB FRACTURE. All CT scans at this facility use dose modulation, iterative reconstruction, and/or weight based dosing when appropriate to reduce radiation dose to as low as reasonably achievable. CT ABDOMEN PELVIS W IV CONTRAST Additional Contrast? None   Final Result   1. SOFT TISSUE CONTUSION INVOLVING THE RIGHT POSTEROLATERAL ABDOMINAL WALL. 2. 10 X 5 X 10 CM RIGHT POSTERIOR ABDOMINAL WALL HEMATOMA WITH L2 CHIP FRACTURE. 3. NO INTRAPERITONEAL OR RETROPERITONEAL HEMORRHAGE OR HEMATOMA. 4. NO ABDOMINAL ORGAN TRAUMATIC INJURY OR ABDOMINAL ORGAN LACERATION. 5. DEGENERATIVE BONE CHANGES IN THE LUMBAR SPINE AND NO LUMBAR SPINE FRACTURE. 6. NO PELVIC FRACTURE AND ADDITIONAL INCIDENTAL FINDINGS IN BODY OF REPORT. All CT scans at this facility use dose modulation, iterative reconstruction, and/or weight based dosing when appropriate to reduce radiation dose to as low as reasonably achievable. XR CHEST PORTABLE    (Results Pending)         EKG Interpretation  Interpreted by emergency department physician    Rhythm: normal sinus   Rate: normal  Axis: normal  Conduction: normal  ST Segments: no acute change  T Waves: no acute change    Clinical Impression: no acute changes  Comparison to prior EKG: no previous EKG      ------------------------- NURSING NOTES AND VITALS REVIEWED ---------------------------   The nursing notes within the ED encounter and vital signs as below have been reviewed by myself. /61   Pulse 70   Temp 97.9 °F (36.6 °C) (Oral)   Resp 16   SpO2 98%   Oxygen Saturation Interpretation: Normal    The patients available past medical records and past encounters were reviewed.         ------------------------------ ED COURSE/MEDICAL DECISION MAKING----------------------  Medications   iopamidol (ISOVUE-300) 61 % injection 100 mL (100 mLs Intravenous Given 6/29/20 1142)             Medical Decision Making:    I have ordered EKG and lab work.  We will obtain a CAT scan without contrast of his head. In addition I have ordered a CT of his chest abdomen and pelvis with IV contrast due to the blunt force trauma    Re-Evaluations:             Re-evaluation. Patients symptoms show no change      Consultations:             Telephone consult placed to the hospitalist to admit the patient for syncope. Was discussed at 1:04 PM all lab work and all CT findings were discussed. He will admit the patient to a telemetry bed    Critical Care: This patient's ED course included: re-evaluation prior to disposition, cardiac monitoring, continuous pulse oximetry and a personal history and physicial eaxmination    This patient has remained hemodynamically stable, remained unchanged and been closely monitored during their ED course. Counseling: The emergency provider has spoken with the patient and discussed todays results, in addition to providing specific details for the plan of care and counseling regarding the diagnosis and prognosis. Questions are answered at this time and they are agreeable with the plan.       --------------------------------- IMPRESSION AND DISPOSITION ---------------------------------    IMPRESSION  1. Syncope and collapse    2. Blunt trauma to chest, initial encounter    3. Other closed fracture of second lumbar vertebra, initial encounter ([facility] [address]    4. Hematoma        DISPOSITION  Disposition: Admit to telemetry  Patient condition is fair        NOTE: This report was transcribed using voice recognition software.  Every effort was made to ensure accuracy; however, inadvertent computerized transcription errors may be present          Noe Espitia MD  06/29/20 2513

## 2020-06-30 PROBLEM — R26.9 GAIT ABNORMALITY: Status: ACTIVE | Noted: 2020-06-30

## 2020-06-30 PROBLEM — G26 EXTRAPYRAMIDAL AND MOVEMENT DISORDERS IN DISEASES CLASSIFIED ELSEWHERE: Status: ACTIVE | Noted: 2018-06-20

## 2020-06-30 PROBLEM — I95.1 ORTHOSTATIC HYPOTENSION: Status: ACTIVE | Noted: 2018-06-20

## 2020-06-30 PROBLEM — R29.6 FALLS FREQUENTLY: Status: ACTIVE | Noted: 2018-08-24

## 2020-06-30 PROBLEM — H54.40 BLIND RIGHT EYE: Status: ACTIVE | Noted: 2018-08-24

## 2020-06-30 PROBLEM — I95.1 ORTHOSTASIS: Status: ACTIVE | Noted: 2020-06-30

## 2020-06-30 PROBLEM — D03.4 MELANOMA IN SITU OF SCALP (HCC): Status: ACTIVE | Noted: 2018-06-27

## 2020-06-30 LAB
ABO/RH: NORMAL
ANION GAP SERPL CALCULATED.3IONS-SCNC: 12 MEQ/L (ref 9–15)
ANTIBODY SCREEN: NORMAL
APTT: 26.5 SEC (ref 24.4–36.8)
BASOPHILS ABSOLUTE: 0 K/UL (ref 0–0.2)
BASOPHILS RELATIVE PERCENT: 0.6 %
BILIRUBIN URINE: NEGATIVE
BLOOD, URINE: NEGATIVE
BUN BLDV-MCNC: 23 MG/DL (ref 8–23)
CALCIUM SERPL-MCNC: 7.6 MG/DL (ref 8.5–9.9)
CHLORIDE BLD-SCNC: 103 MEQ/L (ref 95–107)
CLARITY: CLEAR
CO2: 21 MEQ/L (ref 20–31)
COLOR: YELLOW
CREAT SERPL-MCNC: 0.77 MG/DL (ref 0.7–1.2)
EOSINOPHILS ABSOLUTE: 0.1 K/UL (ref 0–0.7)
EOSINOPHILS RELATIVE PERCENT: 1.3 %
GFR AFRICAN AMERICAN: >60
GFR AFRICAN AMERICAN: >60
GFR NON-AFRICAN AMERICAN: >60
GFR NON-AFRICAN AMERICAN: >60
GLUCOSE BLD-MCNC: 124 MG/DL (ref 70–99)
GLUCOSE URINE: NEGATIVE MG/DL
HCT VFR BLD CALC: 21.4 % (ref 42–52)
HCT VFR BLD CALC: 23.2 % (ref 42–52)
HEMOGLOBIN: 7.2 G/DL (ref 14–18)
HEMOGLOBIN: 7.7 G/DL (ref 14–18)
INR BLD: 1
KETONES, URINE: 15 MG/DL
LEUKOCYTE ESTERASE, URINE: NEGATIVE
LV EF: 55 %
LVEF MODALITY: NORMAL
LYMPHOCYTES ABSOLUTE: 1.2 K/UL (ref 1–4.8)
LYMPHOCYTES RELATIVE PERCENT: 21.6 %
MCH RBC QN AUTO: 37.2 PG (ref 27–31.3)
MCHC RBC AUTO-ENTMCNC: 33.5 % (ref 33–37)
MCV RBC AUTO: 111.1 FL (ref 80–100)
MONOCYTES ABSOLUTE: 0.5 K/UL (ref 0.2–0.8)
MONOCYTES RELATIVE PERCENT: 9.3 %
NEUTROPHILS ABSOLUTE: 3.8 K/UL (ref 1.4–6.5)
NEUTROPHILS RELATIVE PERCENT: 67.2 %
NITRITE, URINE: NEGATIVE
PDW BLD-RTO: 16 % (ref 11.5–14.5)
PERFORMED ON: NORMAL
PH UA: 5 (ref 5–9)
PLATELET # BLD: 100 K/UL (ref 130–400)
POC CREATININE: 1.1 MG/DL (ref 0.8–1.3)
POC SAMPLE TYPE: NORMAL
POTASSIUM REFLEX MAGNESIUM: 3.7 MEQ/L (ref 3.4–4.9)
PROTEIN UA: NEGATIVE MG/DL
PROTHROMBIN TIME: 13 SEC (ref 12.3–14.9)
RBC # BLD: 1.92 M/UL (ref 4.7–6.1)
REASON FOR REJECTION: NORMAL
REJECTED TEST: NORMAL
SODIUM BLD-SCNC: 136 MEQ/L (ref 135–144)
SPECIFIC GRAVITY UA: 1.04 (ref 1–1.03)
TSH SERPL DL<=0.05 MIU/L-ACNC: 3.08 UIU/ML (ref 0.44–3.86)
UROBILINOGEN, URINE: 1 E.U./DL
WBC # BLD: 5.7 K/UL (ref 4.8–10.8)

## 2020-06-30 PROCEDURE — 86850 RBC ANTIBODY SCREEN: CPT

## 2020-06-30 PROCEDURE — 2580000003 HC RX 258: Performed by: PHYSICIAN ASSISTANT

## 2020-06-30 PROCEDURE — 85610 PROTHROMBIN TIME: CPT

## 2020-06-30 PROCEDURE — 36415 COLL VENOUS BLD VENIPUNCTURE: CPT

## 2020-06-30 PROCEDURE — 1210000000 HC MED SURG R&B

## 2020-06-30 PROCEDURE — 97162 PT EVAL MOD COMPLEX 30 MIN: CPT

## 2020-06-30 PROCEDURE — 86923 COMPATIBILITY TEST ELECTRIC: CPT

## 2020-06-30 PROCEDURE — 36430 TRANSFUSION BLD/BLD COMPNT: CPT

## 2020-06-30 PROCEDURE — 84443 ASSAY THYROID STIM HORMONE: CPT

## 2020-06-30 PROCEDURE — 99222 1ST HOSP IP/OBS MODERATE 55: CPT | Performed by: PHYSICAL MEDICINE & REHABILITATION

## 2020-06-30 PROCEDURE — 85014 HEMATOCRIT: CPT

## 2020-06-30 PROCEDURE — 86900 BLOOD TYPING SEROLOGIC ABO: CPT

## 2020-06-30 PROCEDURE — 6370000000 HC RX 637 (ALT 250 FOR IP): Performed by: INTERNAL MEDICINE

## 2020-06-30 PROCEDURE — P9016 RBC LEUKOCYTES REDUCED: HCPCS

## 2020-06-30 PROCEDURE — 80048 BASIC METABOLIC PNL TOTAL CA: CPT

## 2020-06-30 PROCEDURE — 93306 TTE W/DOPPLER COMPLETE: CPT

## 2020-06-30 PROCEDURE — 85025 COMPLETE CBC W/AUTO DIFF WBC: CPT

## 2020-06-30 PROCEDURE — 85018 HEMOGLOBIN: CPT

## 2020-06-30 PROCEDURE — 2580000003 HC RX 258: Performed by: INTERNAL MEDICINE

## 2020-06-30 PROCEDURE — 81003 URINALYSIS AUTO W/O SCOPE: CPT

## 2020-06-30 PROCEDURE — 86901 BLOOD TYPING SEROLOGIC RH(D): CPT

## 2020-06-30 PROCEDURE — 99222 1ST HOSP IP/OBS MODERATE 55: CPT | Performed by: PSYCHIATRY & NEUROLOGY

## 2020-06-30 PROCEDURE — 87086 URINE CULTURE/COLONY COUNT: CPT

## 2020-06-30 PROCEDURE — 6370000000 HC RX 637 (ALT 250 FOR IP): Performed by: PHYSICIAN ASSISTANT

## 2020-06-30 PROCEDURE — 85730 THROMBOPLASTIN TIME PARTIAL: CPT

## 2020-06-30 RX ORDER — 0.9 % SODIUM CHLORIDE 0.9 %
250 INTRAVENOUS SOLUTION INTRAVENOUS ONCE
Status: COMPLETED | OUTPATIENT
Start: 2020-06-30 | End: 2020-06-30

## 2020-06-30 RX ORDER — MIDODRINE HYDROCHLORIDE 5 MG/1
2.5 TABLET ORAL
Status: DISCONTINUED | OUTPATIENT
Start: 2020-06-30 | End: 2020-07-01

## 2020-06-30 RX ADMIN — CARBIDOPA AND LEVODOPA 1 TABLET: 25; 100 TABLET ORAL at 20:45

## 2020-06-30 RX ADMIN — MIDODRINE HYDROCHLORIDE 2.5 MG: 5 TABLET ORAL at 13:51

## 2020-06-30 RX ADMIN — Medication 10 ML: at 20:45

## 2020-06-30 RX ADMIN — ACETAMINOPHEN 650 MG: 325 TABLET, FILM COATED ORAL at 20:49

## 2020-06-30 RX ADMIN — LATANOPROST 1 DROP: 50 SOLUTION OPHTHALMIC at 20:45

## 2020-06-30 RX ADMIN — CARBIDOPA AND LEVODOPA 1 TABLET: 25; 100 TABLET ORAL at 13:51

## 2020-06-30 RX ADMIN — SODIUM CHLORIDE 250 ML: 9 INJECTION, SOLUTION INTRAVENOUS at 12:37

## 2020-06-30 RX ADMIN — CARBIDOPA AND LEVODOPA 1 TABLET: 25; 100 TABLET ORAL at 09:25

## 2020-06-30 RX ADMIN — BRIMONIDINE TARTRATE 1 DROP: 2 SOLUTION OPHTHALMIC at 09:26

## 2020-06-30 RX ADMIN — FOLIC ACID 1000 MCG: 1 TABLET ORAL at 09:25

## 2020-06-30 RX ADMIN — MIDODRINE HYDROCHLORIDE 2.5 MG: 5 TABLET ORAL at 09:29

## 2020-06-30 RX ADMIN — CYANOCOBALAMIN TAB 500 MCG 1000 MCG: 500 TAB at 09:25

## 2020-06-30 RX ADMIN — VITAMIN D, TAB 1000IU (100/BT) 1000 UNITS: 25 TAB at 09:25

## 2020-06-30 RX ADMIN — MIDODRINE HYDROCHLORIDE 2.5 MG: 5 TABLET ORAL at 18:25

## 2020-06-30 SDOH — ECONOMIC STABILITY: FOOD INSECURITY: WITHIN THE PAST 12 MONTHS, THE FOOD YOU BOUGHT JUST DIDN'T LAST AND YOU DIDN'T HAVE MONEY TO GET MORE.: NEVER TRUE

## 2020-06-30 SDOH — SOCIAL STABILITY: SOCIAL NETWORK: HOW OFTEN DO YOU ATTENT MEETINGS OF THE CLUB OR ORGANIZATION YOU BELONG TO?: NEVER

## 2020-06-30 SDOH — SOCIAL STABILITY: SOCIAL INSECURITY: WITHIN THE LAST YEAR, HAVE YOU BEEN AFRAID OF YOUR PARTNER OR EX-PARTNER?: NO

## 2020-06-30 SDOH — HEALTH STABILITY: PHYSICAL HEALTH: ON AVERAGE, HOW MANY DAYS PER WEEK DO YOU ENGAGE IN MODERATE TO STRENUOUS EXERCISE (LIKE A BRISK WALK)?: 3 DAYS

## 2020-06-30 SDOH — SOCIAL STABILITY: SOCIAL INSECURITY
WITHIN THE LAST YEAR, HAVE YOU BEEN KICKED, HIT, SLAPPED, OR OTHERWISE PHYSICALLY HURT BY YOUR PARTNER OR EX-PARTNER?: NO

## 2020-06-30 SDOH — SOCIAL STABILITY: SOCIAL NETWORK: HOW OFTEN DO YOU GET TOGETHER WITH FRIENDS OR RELATIVES?: ONCE A WEEK

## 2020-06-30 SDOH — SOCIAL STABILITY: SOCIAL NETWORK
IN A TYPICAL WEEK, HOW MANY TIMES DO YOU TALK ON THE PHONE WITH FAMILY, FRIENDS, OR NEIGHBORS?: MORE THAN THREE TIMES A WEEK

## 2020-06-30 SDOH — HEALTH STABILITY: MENTAL HEALTH
STRESS IS WHEN SOMEONE FEELS TENSE, NERVOUS, ANXIOUS, OR CAN'T SLEEP AT NIGHT BECAUSE THEIR MIND IS TROUBLED. HOW STRESSED ARE YOU?: ONLY A LITTLE

## 2020-06-30 SDOH — SOCIAL STABILITY: SOCIAL NETWORK: HOW OFTEN DO YOU ATTEND CHURCH OR RELIGIOUS SERVICES?: 1 TO 4 TIMES PER YEAR

## 2020-06-30 SDOH — SOCIAL STABILITY: SOCIAL INSECURITY: WITHIN THE LAST YEAR, HAVE YOU BEEN HUMILIATED OR EMOTIONALLY ABUSED IN OTHER WAYS BY YOUR PARTNER OR EX-PARTNER?: NO

## 2020-06-30 SDOH — HEALTH STABILITY: PHYSICAL HEALTH: ON AVERAGE, HOW MANY MINUTES DO YOU ENGAGE IN EXERCISE AT THIS LEVEL?: 20 MIN

## 2020-06-30 SDOH — SOCIAL STABILITY: SOCIAL NETWORK
DO YOU BELONG TO ANY CLUBS OR ORGANIZATIONS SUCH AS CHURCH GROUPS UNIONS, FRATERNAL OR ATHLETIC GROUPS, OR SCHOOL GROUPS?: NO

## 2020-06-30 SDOH — SOCIAL STABILITY: SOCIAL NETWORK: ARE YOU MARRIED, WIDOWED, DIVORCED, SEPARATED, NEVER MARRIED, OR LIVING WITH A PARTNER?: MARRIED

## 2020-06-30 SDOH — ECONOMIC STABILITY: FOOD INSECURITY: WITHIN THE PAST 12 MONTHS, YOU WORRIED THAT YOUR FOOD WOULD RUN OUT BEFORE YOU GOT MONEY TO BUY MORE.: NEVER TRUE

## 2020-06-30 SDOH — SOCIAL STABILITY: SOCIAL INSECURITY
WITHIN THE LAST YEAR, HAVE TO BEEN RAPED OR FORCED TO HAVE ANY KIND OF SEXUAL ACTIVITY BY YOUR PARTNER OR EX-PARTNER?: NO

## 2020-06-30 SDOH — ECONOMIC STABILITY: TRANSPORTATION INSECURITY
IN THE PAST 12 MONTHS, HAS THE LACK OF TRANSPORTATION KEPT YOU FROM MEDICAL APPOINTMENTS OR FROM GETTING MEDICATIONS?: NO

## 2020-06-30 SDOH — ECONOMIC STABILITY: INCOME INSECURITY: HOW HARD IS IT FOR YOU TO PAY FOR THE VERY BASICS LIKE FOOD, HOUSING, MEDICAL CARE, AND HEATING?: NOT HARD AT ALL

## 2020-06-30 SDOH — ECONOMIC STABILITY: TRANSPORTATION INSECURITY
IN THE PAST 12 MONTHS, HAS LACK OF TRANSPORTATION KEPT YOU FROM MEETINGS, WORK, OR FROM GETTING THINGS NEEDED FOR DAILY LIVING?: NO

## 2020-06-30 ASSESSMENT — ENCOUNTER SYMPTOMS
ABDOMINAL PAIN: 1
SHORTNESS OF BREATH: 0
PHOTOPHOBIA: 0
CHOKING: 0
COLOR CHANGE: 0
TROUBLE SWALLOWING: 0
BACK PAIN: 0
VOMITING: 0
NAUSEA: 0

## 2020-06-30 ASSESSMENT — PAIN DESCRIPTION - PAIN TYPE: TYPE: ACUTE PAIN

## 2020-06-30 ASSESSMENT — PAIN SCALES - GENERAL
PAINLEVEL_OUTOF10: 5
PAINLEVEL_OUTOF10: 4
PAINLEVEL_OUTOF10: 5

## 2020-06-30 ASSESSMENT — PAIN DESCRIPTION - LOCATION: LOCATION: BACK

## 2020-06-30 ASSESSMENT — PAIN DESCRIPTION - ORIENTATION: ORIENTATION: RIGHT;LOWER

## 2020-06-30 NOTE — CONSULTS
Physical Medicine & Rehabilitation  Consult Note      Admitting Physician: Ysabel Miller DO    Primary Care Provider: Wilver Blake MD     Reason for Consult:  Asses rehab needs,promote physical and mental function, and decrease likelihood of re-admit to the hospital after discharge. History of Present Illness:    Austyn Devi is a [de-identified] y.o. male admitted to [facility] on 6/29/2020. PATIENT FELL WITH BLUNT CHEST TRAUMA, patient is a Parkinson's patient with severe orthostasis recently fell at home causing trauma to his chest and abdominal wall. Felt to be related to orthostasis related to Parkinson's disease with Parkinson's medications and dehydration. Loss of Consciousness   This is a recurrent problem. The current episode started in the past 7 days. The problem occurs intermittently. The problem has been unchanged. He lost consciousness for a period of 1 to 5 minutes. The symptoms are aggravated by standing. Associated symptoms include abdominal pain, back pain, confusion, dizziness, light-headedness, malaise/fatigue and weakness. Pertinent negatives include no auditory change, aura, bladder incontinence, bowel incontinence, chest pain, clumsiness, diaphoresis, fever, focal sensory loss, focal weakness, headaches, nausea, palpitations, slurred speech, vertigo, visual change or vomiting. He has tried walking, position change and medication for the symptoms. The treatment provided mild relief. His past medical history is significant for CAD. There is no history of arrhythmia, seizures, a sudden death in family, TIA or vertigo. Neurologic Problem   The patient's primary symptoms include weakness. The patient's pertinent negatives include no clumsiness, focal sensory loss, focal weakness, slurred speech or visual change. Associated symptoms include abdominal pain, back pain, confusion, dizziness, fatigue, light-headedness and shortness of breath.  Pertinent negatives include no auditory change, aura, bladder incontinence, bowel incontinence, chest pain, diaphoresis, fever, headaches, nausea, neck pain, palpitations, vertigo or vomiting. Their inpatient work up has included:    Imaging:    Imaging and other studies reviewed and discussed with patient and staff    Xr Lumbar Spine 6/29/2020    Nonspecific bowel gas pattern. Vascular calcifications noted abdominal aorta. Normal sagittal alignment lumbar spine. Possible fracture versus osteophyte anterior superior corner of L2. Moderate facet osteoarthropathy L3-S1. Moderate degenerative disc disease L5-S1. No Pars interarticularis fracture defect. Impression:    1. Possible chip fracture versus osteophyte anterior superior corner of L2, further evaluation with MRI of the lumbar spine recommended. 2. Moderate facet osteoarthropathy L3-S1 with moderate degenerative disc disease L5-S1.   3. Vascular calcifications abdominal aorta. Ct Head   6/29/2020   1. No CT evidence of acute intracranial abnormality, as questioned. 2. Moderate to moderately severe cerebral volume loss/atrophy with white matter changes consistent with sequelae small vessel ischemic disease. .   3. Vascular calcifications within the bilateral internal carotid artery cavernous segments and the vertebral arteries. .   4. Empty sella. Ct Chest   6/29/2020  EXAMINATION:  CT CHEST WITH IV CONTRAST CLINICAL HISTORY:  1. NO ACUTE TRAUMATIC INJURY TO THE CHEST AS DESCRIBED IN BODY OF REPORT. 2. MEDIASTINAL STRUCTURES AND THORACIC AORTA INTACT. NO MEDIASTINAL HEMATOMA. 3. LUNGS APPEAR EXPANDED AND CLEAR WITHOUT A PULMONARY CONTUSION OR PNEUMOTHORAX. 4. DEGENERATIVE BONE SPURRING IN THE THORACIC SPINE AND NO THORACIC SPINE FRACTURE. 5. RIBS APPEAR INTACT AND NO CT EVIDENCE OF A DISPLACED RIB FRACTURE. Ct Abdomen Pelvis : 6/29/2020   1. SOFT TISSUE CONTUSION INVOLVING THE RIGHT POSTEROLATERAL ABDOMINAL WALL.  2. 10 X 5 X 10 CM RIGHT POSTERIOR ABDOMINAL WALL HEMATOMA WITH L2 CHIP FRACTURE. 3. NO INTRAPERITONEAL OR RETROPERITONEAL HEMORRHAGE OR HEMATOMA. 4. NO ABDOMINAL ORGAN TRAUMATIC INJURY OR ABDOMINAL ORGAN LACERATION. 5. DEGENERATIVE BONE CHANGES IN THE LUMBAR SPINE AND NO LUMBAR SPINE FRACTURE. 6. NO PELVIC FRACTURE AND ADDITIONAL INCIDENTAL FINDINGS IN BODY OF REPORT. Xr Chest   6/29/2020    Lungs and pleura:  No consolidation. No pleural effusion. No pneumothorax. Normal pulmonary vascular pattern. Cardiomediastinal silhouette:  Stable cardiomediastinal silhouette. Other:  No acute osseous findings. Remote fracture deformity left proximal humerus. No acute radiographic abnormality. Us Carotid Artery 6/29/2020    1. No areas of estimated luminal stenosis greater than 0-49 percent within the bilateral internal carotid arteries. . 2. Scattered areas of calcified and noncalcified plaque bilateral internal carotid arteries and bilateral carotid artery bulb/bifurcations.  GOSINK CRITERIA Diameter          PSV t            EDV t          PSV          EDV          Stenosis Site       %              cm/sec          cm/sec      ICA/CCA    ICA/CCA 0-49                 <124              <40             <2:1           ---                 --- 50-69              125-225                  >2:1           ---                 --- 70-89               225-325          >100          >4:1           >5:1              --- 90%+                >325              >100          >4:1           >9:1           Damped resistive CCA >95%               May be            May be      Damped      ---              Damped resistive CCA                       decreased      decreased  resistive CCA            Labs:     labs reviewed and discussed with patient and staff    Lab Results   Component Value Date    POCGLU 103 07/14/2017     Lab Results   Component Value Date     07/01/2020    K 3.6 07/01/2020     07/01/2020    CO2 20 07/01/2020    BUN 15 to maximize safety during self care. ADL  Feeding: Setup (06/29/20 1621)  Grooming: Setup (06/29/20 1621)  UE Bathing: Setup (06/29/20 1621)  LE Bathing: Moderate assistance (06/29/20 1621)  UE Dressing: Setup (06/29/20 1621)  LE Dressing: Moderate assistance (06/29/20 1621)  Toileting: Moderate assistance (06/29/20 1621)  Additional Comments: Simulated all ADLs as above (06/29/20 1621)  OCCUPATIONAL THERAPY  Hand Dominance: Right  ADL  Feeding: Setup (06/29/20 1621)  Grooming: Setup (06/29/20 1621)  UE Bathing: Setup (06/29/20 1621)  LE Bathing: Moderate assistance (06/29/20 1621)  UE Dressing: Setup (06/29/20 1621)  LE Dressing: Moderate assistance (06/29/20 1621)  Toileting:  Moderate assistance (06/29/20 1621)  Additional Comments: Simulated all ADLs as above (06/29/20 1621)  Toilet Transfers  Toilet Transfer: Unable to assess (06/29/20 1624)  Toilet Transfers Comments: anticipate min-CGA (06/29/20 1624)     Shower Transfers  Shower Transfers: Not tested (06/29/20 1624)      LTG:                 Speech therapy: FIMS:          Past Medical History:          Diagnosis Date    Actinic keratoses     Aortic valve stenosis, severe 9/20/2013    BPH (benign prostatic hyperplasia)     Cancer ([facility] [address] 04/2018    skin cancer on chest removed    Colon polyp 20/51/2192    Diastolic dysfunction 22/99/8928    Stage 2 by echo    ED (erectile dysfunction) 11/29/2011    Glaucoma     Dr. Nila Stuart Hemochromatosis     History of echocardiogram 02/18/2015    mild AS, mild AR, mild TR, mild MR    [place]    Hypertension     Iron deficiency anemia 4/28/2014    LVH (left ventricular hypertrophy) 9/20/2013    Macular degeneration     bilateral; dry on left and wet on right-Dr. Genesis Benson on back 5/14/2015    Osteoarthritis cervical spine     Parkinson disease ([facility] [address]     Dr. Feli Lopez         PastSurgical History:          Procedure Laterality Date    BACK SURGERY      CARDIAC CATHETERIZATION  11/09/2017    [facility]AR ECHOL[facility] MD    CATARACT REMOVAL      COLONOSCOPY  11/2009    single polyp right colon    EYE SURGERY Bilateral     OTHER SURGICAL HISTORY  06/08/15    EXC SUBCU MASS BACK    TONSILLECTOMY           Allergies:      Allergies   Allergen Reactions    Cat Hair Extract Itching and Swelling     cats        CurrentMedications:     Current Facility-Administered Medications: midodrine (PROAMATINE) tablet 5 mg, 5 mg, Oral, TID WC  sodium chloride flush 0.9 % injection 10 mL, 10 mL, Intravenous, 2 times per day  sodium chloride flush 0.9 % injection 10 mL, 10 mL, Intravenous, PRN  acetaminophen (TYLENOL) tablet 650 mg, 650 mg, Oral, Q6H PRN **OR** acetaminophen (TYLENOL) suppository 650 mg, 650 mg, Rectal, Q6H PRN  polyethylene glycol (GLYCOLAX) packet 17 g, 17 g, Oral, Daily PRN  promethazine (PHENERGAN) tablet 12.5 mg, 12.5 mg, Oral, Q6H PRN **OR** ondansetron (ZOFRAN) injection 4 mg, 4 mg, Intravenous, Q6H PRN  latanoprost (XALATAN) 0.005 % ophthalmic solution 1 drop, 1 drop, Both Eyes, Nightly  brimonidine (ALPHAGAN) 0.2 % ophthalmic solution 1 drop, 1 drop, Both Eyes, BID  carbidopa-levodopa (SINEMET)  MG per tablet 1 tablet, 1 tablet, Oral, TID  Vitamin D (CHOLECALCIFEROL) tablet 1,000 Units, 1,000 Units, Oral, Daily  vitamin B-12 (CYANOCOBALAMIN) tablet 1,000 mcg, 1,000 mcg, Oral, Daily  folic acid (FOLVITE) tablet 1,000 mcg, 1,000 mcg, Oral, Daily      Social History:    Social History     Socioeconomic History    Marital status:      Spouse name: Not on file    Number of children: 3    Years of education: Not on file    Highest education level: Not on file   Occupational History    Occupation: retired     Employer: [employer]   Social Needs    Financial resource strain: Not hard at all   [employer] insecurity     Worry: Never true     Inability: Never true    Transportation needs     Medical: No     Non-medical: No   Tobacco Use    Smoking status: Never eye: No discharge. Left eye: No discharge. Conjunctiva/sclera: Conjunctivae normal.      Pupils: Pupils are equal, round, and reactive to light. Neck:      Musculoskeletal: Normal range of motion. Spinous process tenderness and muscular tenderness present. Thyroid: No thyromegaly. Vascular: Normal carotid pulses. No carotid bruit, hepatojugular reflux or JVD. Trachea: No tracheal deviation. Cardiovascular:      Heart sounds: Heart sounds are distant. Pulmonary:      Effort: Pulmonary effort is normal.      Breath sounds: No stridor. Decreased breath sounds present. Abdominal:      General: Bowel sounds are decreased. There is no distension. Palpations: Abdomen is soft. Tenderness: There is no abdominal tenderness. There is no guarding or rebound. Musculoskeletal:      Cervical back: He exhibits decreased range of motion and tenderness. Thoracic back: He exhibits decreased range of motion and tenderness. Lumbar back: He exhibits decreased range of motion and tenderness. Back:    Lymphadenopathy:      Head:      Right side of head: No submental or submandibular adenopathy. Left side of head: No submental or submandibular adenopathy. Cervical: No cervical adenopathy. Skin:     General: Skin is warm and dry. Coloration: Skin is pale. Findings: Abrasion and bruising present. Comments: Old IV sites   trauma to his chest and abdominal wall. Neurological:      Sensory: Sensory deficit present. Coordination: Coordination abnormal. Finger-Nose-Finger Test abnormal, Heel to [employer] abnormal and Romberg Test abnormal.      Gait: Gait abnormal and tandem walk abnormal.      Deep Tendon Reflexes:      Reflex Scores:       Tricep reflexes are 0 on the right side and 0 on the left side. Bicep reflexes are 1+ on the right side and 1+ on the left side.        Brachioradialis reflexes are 1+ on the right side and 1+ on the left side.       Patellar reflexes are 0 on the right side and 0 on the left side. Achilles reflexes are 0 on the right side and 0 on the left side. Psychiatric:         Attention and Perception: He is attentive. Mood and Affect: Mood is not anxious or depressed. Affect is not angry. Speech: Speech is delayed. Speech is not rapid and pressured. Behavior: Behavior is slowed. Behavior is not withdrawn. Thought Content: Thought content normal.         Cognition and Memory: Memory is not impaired. He exhibits impaired recent memory. He does not exhibit impaired remote memory. Judgment: Judgment is not impulsive or inappropriate. Ortho Exam  Neurologic Exam     Mental Status   Level of consciousness: drowsy ,  alert  Able to name object. Cranial Nerves     CN III, IV, VI   Pupils are equal, round, and reactive to light.     Motor Exam   Muscle bulk: decreased  Right arm tone: cogwheel rigidity  Left arm tone: cogwheel rigidity    Sensory Exam   Right leg light touch: decreased from knee  Left leg light touch: decreased from knee    Gait, Coordination, and Reflexes     Gait  Gait: shuffling    Coordination   Romberg: positive  Finger to nose coordination: abnormal  Heel to shin coordination: abnormal  Tandem walking coordination: abnormal    Tremor   Resting tremor: present  Intention tremor: present    Reflexes   Right brachioradialis: 1+  Left brachioradialis: 1+  Right biceps: 1+  Left biceps: 1+  Right triceps: 0  Left triceps: 0  Right patellar: 0  Left patellar: 0  Right achilles: 0  Left achilles: 0            Diagnostics:    Recent Results (from the past 24 hour(s))   Hemoglobin and Hematocrit, Blood    Collection Time: 06/30/20  5:39 PM   Result Value Ref Range    Hemoglobin 7.7 (L) 14.0 - 18.0 g/dL    Hematocrit 23.2 (L) 42.0 - 52.0 %   CBC auto differential    Collection Time: 07/01/20  5:48 AM   Result Value Ref Range    WBC 4.9 4.8 - 10.8 K/uL    RBC 2.02 (L) 4.70 - 6.10 M/uL    Hemoglobin 7.3 (L) 14.0 - 18.0 g/dL    Hematocrit 21.8 (L) 42.0 - 52.0 %    .8 (H) 80.0 - 100.0 fL    MCH 36.0 (H) 27.0 - 31.3 pg    MCHC 33.4 33.0 - 37.0 %    RDW 19.6 (H) 11.5 - 14.5 %    Platelets 91 (L) 856 - 400 K/uL    PLATELET SLIDE REVIEW Decreased     Neutrophils % 70.7 %    Lymphocytes % 18.5 %    Monocytes % 8.1 %    Eosinophils % 1.8 %    Basophils % 0.9 %    Neutrophils Absolute 3.4 1.4 - 6.5 K/uL    Lymphocytes Absolute 0.9 (L) 1.0 - 4.8 K/uL    Monocytes Absolute 0.4 0.2 - 0.8 K/uL    Eosinophils Absolute 0.1 0.0 - 0.7 K/uL    Basophils Absolute 0.0 0.0 - 0.2 K/uL    Macrocytes 1+    Basic Metabolic Panel w/ Reflex to MG    Collection Time: 07/01/20  5:48 AM   Result Value Ref Range    Sodium 137 135 - 144 mEq/L    Potassium reflex Magnesium 3.6 3.4 - 4.9 mEq/L    Chloride 103 95 - 107 mEq/L    CO2 20 20 - 31 mEq/L    Anion Gap 14 9 - 15 mEq/L    Glucose 97 70 - 99 mg/dL    BUN 15 8 - 23 mg/dL    CREATININE 0.60 (L) 0.70 - 1.20 mg/dL    GFR Non-African American >60.0 >60    GFR  >60.0 >60    Calcium 7.4 (L) 8.5 - 9.9 mg/dL              Impression:    1. Impaired mobility and ADLs due to progression of severe Parkinson's disease with orthostasis  2. L2 CHIP FRACTURE  3. Fatigue and Malaise      Complex Active General Medical Issues thatcomplicate care Assess & Plan:     1. 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 *            Recommendations:    1.  Considering all of the factors aboveincluding the patient's current medical status, social status/home envirnment, their functional needs and their ability to participate in a therapy program, I feel that they would best be served at a  acute   Rehabilitationlevel of care. It is my opinion that they will be able to tolerate and benefit from 3 hours of therapy a day. I reviewed the varous local options re these levels of care with the patient and family. 2. Nursing care to focus on bowel and bladder issues. 3. Monitor orthostatic BPs add abdominal binder and teds push hydration  4. Monitor for oropharyngeal dysphasia and poor p.o. intake  5. Vitamin B12 shot times one  6. Improve hydration and Nutrition by adding Proteinex and push PO fluids    Greater 50% of 50 minutes spent evaluating patient face to face. It was my pleasure toevaluate [facility] today. Please call [phone] with questions.     Lourdes Rain, DO

## 2020-06-30 NOTE — PROGRESS NOTES
Physical Therapy Med Surg Initial Assessment  [facility]  Room: 22/N222-       NAME: Lindy Flowers  : 1940 ([de-identified] y.o.)  MRN: [id number]  CODE STATUS: Full Code    Date of Service: 2020    Patient Diagnosis(es): Syncope and collapse [R55]   Chief Complaint   Patient presents with    Loss of Consciousness     no fall or trauma       Patient Active Problem List    Diagnosis Date Noted    Syncope and collapse 2020    Closed fracture of body of lumbar vertebra ([address] 2020    MDS (myelodysplastic syndrome), low grade ([address] 2019    Parkinson disease ([address]     Macular degeneration     Glaucoma     BPH (benign prostatic hyperplasia)     Anemia 2014    Aortic valve stenosis, severe 2013    LVH (left ventricular hypertrophy)     Diastolic dysfunction     Hypertension 2011        Past Medical History:   Diagnosis Date    Actinic keratoses     Aortic valve stenosis, severe 2013    BPH (benign prostatic hyperplasia)     Cancer ([address] 2018    skin cancer on chest removed    Colon polyp     Diastolic dysfunction     Stage 2 by echo    ED (erectile dysfunction) 2011    Glaucoma     Dr. Walker Boles Hemochromatosis     History of echocardiogram 2015    mild AS, mild AR, mild TR, mild MR    [place]    Hypertension     Iron deficiency anemia 2014    LVH (left ventricular hypertrophy) 2013    Macular degeneration     bilateral; dry on left and wet on right-Dr. Isak De on back 2015    Osteoarthritis cervical spine     Parkinson disease ([address]     Dr. Mega Johnson     Past Surgical History:   Procedure Laterality Date    BACK SURGERY      CARDIAC CATHETERIZATION  2017    [facility] T[facility]AR ECHOLS MD    CATARACT REMOVAL      COLONOSCOPY  2009    single polyp right colon    EYE SURGERY Bilateral     OTHER SURGICAL HISTORY 06/08/15    EXC SUBCU MASS BACK    TONSILLECTOMY         Chart Reviewed: Yes  Family / Caregiver Present: Yes(spouse)  General Comment  Comments: Pt resting in bed - agreeable to PT evaluation    Restrictions:  Restrictions/Precautions: Fall Risk     SUBJECTIVE: Subjective: \"I really want to go home. \"    Pain  Pre Treatment Pain Screening  Comments / Details: denies. Pt states that he only has pain (LBP) when he moves. 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 Pt reports high level of indep prior to admission. OBJECTIVE:   Vision Exceptions: Wears glasses for reading  Hearing: Within functional limits    Cognition:  Overall Orientation Status: Within Functional Limits  Follows Commands: Within Functional Limits    Observation/Palpation  Observation: No acute distress noted. Edema: R lower back large hematoma    ROM:  RLE PROM: WFL  LLE PROM: WFL    Strength:  Strength RLE  Strength RLE: WFL  Strength LLE  Strength LLE: WFL    Neuro:  Balance  Sitting - Static: Good  Sitting - Dynamic: Fair  Standing - Static: (NT)     Motor Control  Gross Motor?: WFL  Sensation  Overall Sensation Status: WFL    Bed mobility  Rolling to Right: Stand by assistance  Supine to Sit: Stand by assistance  Sit to Supine: Stand by assistance  Comment: Increased time and effort. Pt with increased discomfort in Low back with transition. bedrails utilized. Dizzy upon sitting up. Transfers  Sit to Stand: Minimal Assistance  Stand to sit: Contact guard assistance  Comment: Pt requires initial lifting assist to rise to stand. Ambulation  Ambulation?: No(deferred d/t dizziness/hypotension)              Activity Tolerance  Activity Tolerance: Supine BP = 103/70; seated BP = 84/65 (symptomatic for orthostatic hypotension)          PT Education  PT Education: Goals;PT Role;Plan of Care;Disease Specific Education;General Safety  Patient Education: Importance of timing parkinson's medications    ASSESSMENT:   Body structures, Functions, Activity limitations: Decreased functional mobility ; Decreased strength;Decreased endurance;Decreased balance    Patient Education: Importance of timing parkinson's medications    DISCHARGE RECOMMENDATIONS:       Assessment: Pt unable to complete full mobility assessment d/t symptomatic orthostatic hypotension. pt admitted following fall however prior to admission, pt reports high level of indep. Continued PT indicated to prevent further functional decline, to progress mobility in house, and facilitate DC at highest level of indep and safety. Concerns at this time for pt returning home d/t falls risk and inability to ambulate. Will follow and update as pt progresses. PLAN OF CARE:  Plan  Times per week: 3-6  Current Treatment Recommendations: Strengthening, Balance Training, ADL/Self-care Training, Transfer Training, Functional Mobility Training, Gait Training, Stair training, Endurance Training, Neuromuscular Re-education, Safety Education & Training, Home Exercise Program, Patient/Caregiver Education & Training, Equipment Evaluation, Education, & procurement  Safety Devices  Type of devices:  All fall risk precautions in place    Goals:  Short term goals  Short term goal 1: Pt to complete HEP with indep  Long term goals  Long term goal 1: Pt to complete all bed mobility with indep  Long term goal 2: Pt to complete all transfers with supervision  Long term goal 3: Pt to ambulate 50-150ft with LRD and supervision    [facility] (6 CLICK) Marissa Cabrera 28 Inpatient Mobility Raw Score : 15     Therapy Time:   Individual   Time In 1010   Time Out 1020   Minutes 10           [place], [place], 06/30/20 at 10:35 AM         Definitions for assistance levels  Independent = pt does not require any physical supervision or assistance from another person for activity completion. Device may be needed.   Stand by assistance = pt requires verbal cues or instructions from another person, close to but not touching, to perform the activity  Minimal assistance= pt performs 75% or more of the activity; assistance is required to complete the activity  Moderate assistance= pt performs 50% of the activity; assistance is required to complete the activity  Maximal assistance = pt performs 25% of the activity; assistance is required to complete the activity  Dependent = pt requires total physical assistance to accomplish the task

## 2020-06-30 NOTE — CARE COORDINATION
LSW spoke with pt and his wife regarding DC plans. Pt has a hx of parkinsons but he and his wife are very active normally. He is very weak right now but hopeful that when he feels better he will be able to return home. Pt agreed to consider rehab as an option. LSW to follow.

## 2020-06-30 NOTE — PROGRESS NOTES
CT scan and x-rays of the lumbar spine confirm patient most likely has osteophyte at the anterior superior corner of L2 and most likely not a fracture. (Even if this is a fracture treatment is the same and that is time and symptomatic treatment.)  At this time continue to treat him for soft tissue injuries of the right buttock and hip area. No surgery indicated.

## 2020-06-30 NOTE — DISCHARGE INSTR - COC
Continuity of Care Form    Patient Name: Michael Chapa   :  1940  MRN:  [id number]    Admit date:  2020  Discharge date:  ***    Code Status Order: Full Code   Advance Directives:   Advance Care Flowsheet Documentation     Date/Time Healthcare Directive Type of Healthcare Directive Copy in [address] Agent's Name Healthcare Agent's Phone Number    20 1552  No, patient does not have an advance directive for healthcare treatment -- -- -- -- --    20 1459  No, patient does not have an advance directive for healthcare treatment -- -- -- -- --          Admitting Physician:  Daniel Nelson DO  PCP: Iris Garcia MD    Discharging Nurse: [facility] Unit/Room#: M697/V283-66  Discharging Unit Phone Number: ***    Emergency Contact:   Extended Emergency Contact Information  Primary Emergency Contact: [facility]  Address: [place] [address], [address] [address] Phone: [phone]  Mobile Phone: [phone]  Relation: Spouse    Past Surgical History:  Past Surgical History:   Procedure Laterality Date    BACK SURGERY      CARDIAC CATHETERIZATION  2017    [facility] TUSHAR ECHOLS MD    CATARACT REMOVAL      COLONOSCOPY  2009    single polyp right colon    EYE SURGERY Bilateral     OTHER SURGICAL HISTORY  06/08/15    EXC SUBCU MASS BACK    TONSILLECTOMY         Immunization History:   Immunization History   Administered Date(s) Administered    Influenza 2013    Influenza Virus Vaccine 2014    Influenza, High Dose (Fluzone 65 yrs and older) 09/10/2015, 10/10/2016, 2017, 10/30/2018    Pneumococcal Conjugate 13-valent (Pikbxir52) 2016    Pneumococcal Polysaccharide (Yzoshtabt27) 2011    Tdap (Boostrix, Adacel) 2011       Active Problems:  Patient Active Problem List   Diagnosis Code    Hypertension I10    Aortic valve stenosis, severe I35.0    LVH (left ventricular hypertrophy) I51.7    Anemia Z06.4    Diastolic dysfunction H42.67    Macular degeneration H35.30    Glaucoma H40.9    BPH (benign prostatic hyperplasia) N40.0    Parkinson disease (HCC) G20    MDS (myelodysplastic syndrome), low grade (HCC) D46.20    Syncope and collapse R55    Closed fracture of body of lumbar vertebra (HCC) S32.009A       Isolation/Infection:   Isolation          No Isolation        Patient Infection Status     None to display          Nurse Assessment:  Last Vital Signs: BP (!) 113/57   Pulse 76   Temp 99 °F (37.2 °C) (Oral)   Resp 16   Ht 6' (1.829 m)   Wt 165 lb 5.5 oz (75 kg)   SpO2 99%   BMI 22.42 kg/m²     Last documented pain score (0-10 scale): Pain Level: 0  Last Weight:   Wt Readings from Last 1 Encounters:   20 165 lb 5.5 oz (75 kg)     Mental Status:  {IP PT MENTAL STATUS:26292}    IV Access:  { RIKKI IV ACCESS:876991598}    Nursing Mobility/ADLs:  Walking   {CHP DME MICHAEL:690791740}  Transfer  {CHP DME SATB:069231997}  Bathing  {CHP DME RFVW:552616684}  Dressing  {CHP DME NUQS:417438855}  Toileting  {CHP DME SNCS:696783764}  Feeding  {P DME AMQU:615064618}  Med Admin  {P DME ONA}  Med Delivery   { RIKKI MED Delivery:457316590}    Wound Care Documentation and Therapy:        Elimination:  Continence:   · Bowel: {YES / VO:51837}  · Bladder: {YES / TIERNEY:50623}  Urinary Catheter: {Urinary Catheter:778865811}   Colostomy/Ileostomy/Ileal Conduit: {YES / ZI:52202}       Date of Last BM: ***    Intake/Output Summary (Last 24 hours) at 2020 1026  Last data filed at 2020 0959  Gross per 24 hour   Intake 360 ml   Output --   Net 360 ml     No intake/output data recorded.     Safety Concerns:     [employer] Safety Concerns:347357440}    Impairments/Disabilities:      [employer] Impairments/Disabilities:502928309}    Nutrition Therapy:  Current Nutrition Therapy:   [employer] Diet List:641055269}    Routes of Feeding: {CHP DME Other Feedings:771504117}  Liquids:

## 2020-06-30 NOTE — PROGRESS NOTES
Daily [place] Fails, DO   1,000 Units at 20 4241    vitamin B-12 (CYANOCOBALAMIN) tablet 1,000 mcg  1,000 mcg Oral Daily [place] Fails, DO   1,000 mcg at  3309    folic acid (FOLVITE) tablet 1,000 mcg  1,000 mcg Oral Daily [place] Fails, DO   1,000 mcg at 20 4620       Physical Examination:      Vitals:  BP (!) 81/42   Pulse 73   Temp 99.1 °F (37.3 °C)   Resp 18   Ht 6' (1.829 m)   Wt 165 lb 5.5 oz (75 kg)   SpO2 99%   BMI 22.42 kg/m²   Temp (24hrs), Av.6 °F (37 °C), Min:97.7 °F (36.5 °C), Max:99.1 °F (37.3 °C)      General appearance: alert, cooperative and no distress  Mental Status: oriented to person, place and time and normal affect  Lungs: clear to auscultation bilaterally, normal effort  Heart: regular rate and rhythm, no murmur  Abdomen: soft, nontender, nondistended, bowel sounds present, no masses  Extremities: no edema, redness, tenderness in the calves  Skin: no gross lesions, rashes    Data:     Labs:  Recent Labs     20  1109 20  0707   WBC 12.3* 5.7   HGB 10.4* 7.2*    100*     Recent Labs     20  1100 20  1118 20  0511     --  136   K 4.5  --  3.7     --  103   CO2 17*  --  21   BUN 18  --  23   CREATININE 0.98 1.1 0.77   GLUCOSE 133*  --  124*     Recent Labs     20  1100   AST 20   ALT 9   BILITOT 0.5   ALKPHOS 37       Assessment and Plan:        24-year-old male with Parkinson's disease, hypertension, calcified aortic valve presents after syncopal episode at home which occurred again while he was in wheelchair in the emergency department. He was found to have a 10 cm posterior abdominal wall hematoma and L2 chip fracture. He is admitted for evaluation of syncope.     1.  Syncope secondary to orthostatic hypotension in the setting of autonomic dysfunction from Parkinson's.  -Echocardiogram-rule out significant aortic stenosis  -continue Telemetry monitoring to rule out arrhythmia  - s/p IVF and now euvolemic. BCx pending but no concern for infection at this time  - d/c antihypertensive medication  -added midodrine- may need advanced dose and consideration of Northera as outpatient    2. Acute blood loss anemia due to Abdominal wall hematoma:   - transfuse 1 unit and monitor. If hemoglobin continues to drop will consider repeat CT scan and surgical evaluation    3. L2 chip fracture: Supportive care. PT/OT    4. Parkinson's disease: Continue home Sinemet    SCDs. Holding chemical prophylaxis due to hematoma  Full code    Inpatient.   We will order rehab evaluation    Electronically signed by Carole Quinn DO on 6/30/2020 at 1:49 PM

## 2020-06-30 NOTE — FLOWSHEET NOTE
6328-- Patient assessment complete, awake and alert; oriented to person, place, and time, patient denies pain , denies n/v, denies cp & sob, call light in reach, bed in lowest position, bed alarm engaged, will continue to monitor  1155-- called blood bank regarding unit of blood, they state it is still not ready yet, will call once done.   1237--blood transfusion started

## 2020-06-30 NOTE — CONSULTS
Subjective:      Patient ID: Julissa Waller is a [de-identified] y.o. male who presents today for syncope      HPI 69-year-old right-handed gentleman now was admitted with a history of loss of consciousness. Patient presented emergency room with a history of syncope 90 minutes prior to his arrival to the emergency room. This was his first episode. He does have history of dizziness on and off going on for some time. Patient does have Parkinson's disease runs low blood pressures. Is followed at [facility]. Patient has second episode witnessed by EMS while he was sitting up. Patient was then laid down and he had a rapid recovery. Blood pressure recorded in the emergency room was 84/56 his highest systolic blood pressure has been 113. Patient diagnosed proximal disease 2 years ago and is on carbidopa levodopa 3 times a day. She was not able to stand for us today as he felt woozy and he had a significant drop of his systolic blood pressure to 79  He otherwise functions well he does walk with his wife he is independent and has no cognitive deficits. Review of Systems   Constitutional: Negative for fever. HENT: Negative for ear pain, tinnitus and trouble swallowing. Eyes: Negative for photophobia and visual disturbance. Respiratory: Negative for choking and shortness of breath. Cardiovascular: Negative for chest pain and palpitations. Gastrointestinal: Negative for nausea and vomiting. Musculoskeletal: Negative for back pain, gait problem, joint swelling, myalgias, neck pain and neck stiffness. Skin: Negative for color change. Allergic/Immunologic: Negative for food allergies. Neurological: Negative for dizziness, tremors, seizures, syncope, facial asymmetry, speech difficulty, weakness, light-headedness, numbness and headaches. Psychiatric/Behavioral: Negative for behavioral problems, confusion, hallucinations and sleep disturbance.        Past Medical History:   Diagnosis Date    Actinic keratoses     Aortic valve stenosis, severe 9/20/2013    BPH (benign prostatic hyperplasia)     Cancer ([facility] [address] 04/2018    skin cancer on chest removed    Colon polyp 50/45/2479    Diastolic dysfunction 87/47/4139    Stage 2 by echo    ED (erectile dysfunction) 11/29/2011    Glaucoma     Dr. Richard Oquendo Hemochromatosis     History of echocardiogram 02/18/2015    mild AS, mild AR, mild TR, mild MR    [place]    Hypertension     Iron deficiency anemia 4/28/2014    LVH (left ventricular hypertrophy) 9/20/2013    Macular degeneration     bilateral; dry on left and wet on right-Dr. Bridget Garcia on back 5/14/2015    Osteoarthritis cervical spine     Parkinson disease (HCC)     Dr. Chaz Childress     Past Surgical History:   Procedure Laterality Date    BACK SURGERY      CARDIAC CATHETERIZATION  11/09/2017    [facility] TUSHAR ECHOLS MD    CATARACT REMOVAL      COLONOSCOPY  11/2009    single polyp right colon    EYE SURGERY Bilateral     OTHER SURGICAL HISTORY  06/08/15    EXC SUBCU MASS BACK    TONSILLECTOMY       Social History     Socioeconomic History    Marital status:      Spouse name: Not on file    Number of children: 3    Years of education: Not on file    Highest education level: Not on file   Occupational History    Occupation: retired     Employer: [email or web address] resource strain: Not on file    Food insecurity     Worry: Not on file     Inability: Not on file   [employer] needs     Medical: Not on file     Non-medical: Not on file   Tobacco Use    Smoking status: Never Smoker    Smokeless tobacco: Never Used   Substance and Sexual Activity    Alcohol use: Yes     Comment: rare    Drug use: No    Sexual activity: Never     Partners: Female   Lifestyle    Physical activity     Days per week: Not on file     Minutes per session: Not on file    Stress: Not on file   Relationships    Social connections     Talks on phone: Not on file     Gets together: Not on file     Attends [employer] service: Not on file     Active member of club or organization: Not on file     Attends meetings of clubs or organizations: Not on file     Relationship status: Not on file    Intimate partner violence     Fear of current or ex partner: Not on file     Emotionally abused: Not on file     Physically abused: Not on file     Forced sexual activity: Not on file   Other Topics Concern    Not on file   Social History Narrative    Lives with wife. No family history on file.   Allergies   Allergen Reactions    Cat Hair Extract Itching and Swelling     cats     Current Facility-Administered Medications   Medication Dose Route Frequency Provider Last Rate Last Dose    midodrine (PROAMATINE) tablet 2.5 mg  2.5 mg Oral TID WC Karol Flight, DO   2.5 mg at 06/30/20 0929    0.9 % sodium chloride infusion 250 mL  250 mL Intravenous Once Karol Flight, DO        sodium chloride flush 0.9 % injection 10 mL  10 mL Intravenous 2 times per day JASON Patrick   10 mL at 06/29/20 2116    sodium chloride flush 0.9 % injection 10 mL  10 mL Intravenous PRN Gilberto Patrick        acetaminophen (TYLENOL) tablet 650 mg  650 mg Oral Q6H PRN JASON Patrick        Or    acetaminophen (TYLENOL) suppository 650 mg  650 mg Rectal Q6H PRN JASON Patrick        polyethylene glycol [facility]) packet 17 g  17 g Oral Daily PRN JASON Patrick        promethazine (PHENERGAN) tablet 12.5 mg  12.5 mg Oral Q6H PRN JASON Patrick        Or    [facility]etron [facility]) injection 4 mg  4 mg Intravenous Q6H PRN JASON Saleh        [Held by provider] enoxaparin (LOVENOX) injection 40 mg  40 mg Subcutaneous Daily Gilberto Patrick        latanoprost (XALATAN) 0.005 % ophthalmic solution 1 drop  1 drop Both Eyes Nightly Oneyda Flight, DO   1 drop at 06/29/20 2115    brimonidine (ALPHAGAN) 0.2 % ophthalmic solution 1 drop  1 drop Both Eyes BID Glenroy Janus, DO   1 drop at 20 9174    carbidopa-levodopa (SINEMET)  MG per tablet 1 tablet  1 tablet Oral TID Glenroy Janus, DO   1 tablet at 20 4048    Vitamin D (CHOLECALCIFEROL) tablet 1,000 Units  1,000 Units Oral Daily [place] Janus, DO   1,000 Units at 20 0759    vitamin B-12 (CYANOCOBALAMIN) tablet 1,000 mcg  1,000 mcg Oral Daily [place] Janus, DO   1,000 mcg at  7468    folic acid (FOLVITE) tablet 1,000 mcg  1,000 mcg Oral Daily [place] Janus, DO   1,000 mcg at 20 6289        Objective:   BP (!) 84/56   Pulse 87   Temp 99 °F (37.2 °C) (Oral)   Resp 18   Ht 6' (1.829 m)   Wt 165 lb 5.5 oz (75 kg)   SpO2 99%   BMI 22.42 kg/m²     Physical Exam  Vitals signs reviewed. Eyes:      Pupils: Pupils are equal, round, and reactive to light. Neck:      Musculoskeletal: Normal range of motion. Cardiovascular:      Rate and Rhythm: Normal rate and regular rhythm. Heart sounds: No murmur. Pulmonary:      Effort: Pulmonary effort is normal.      Breath sounds: Normal breath sounds. Abdominal:      General: Bowel sounds are normal.   Musculoskeletal: Normal range of motion. Skin:     General: Skin is warm. Neurological:      Mental Status: He is alert and oriented to person, place, and time. Cranial Nerves: No cranial nerve deficit. Sensory: No sensory deficit. Motor: No abnormal muscle tone. Coordination: Coordination normal.      Deep Tendon Reflexes: Reflexes are normal and symmetric. Babinski sign absent on the right side. Babinski sign absent on the left side. Psychiatric:         Mood and Affect: Mood normal.       We were not able to ambulate him.   Xr Lumbar Spine (2-3 Views)    Result Date: 2020  Patient MRN: [id number] : 1940 Age:  [de-identified] years Gender: Male Order Date: 2020 3:30 PM. Exam: XR LUMBAR SPINE (2-3 VIEWS) Number of Views: 3 Indication:  Fall today with right low back pain radiating into the right hip. L2 chip. Comparison: CT abdomen and pelvis 2020. Findings: Nonspecific bowel gas pattern. Vascular calcifications noted abdominal aorta. Normal sagittal alignment lumbar spine. Possible fracture versus osteophyte anterior superior corner of L2. Moderate facet osteoarthropathy L3-S1. Moderate degenerative disc disease L5-S1. No Pars interarticularis fracture defect. Impression:  1. Possible chip fracture versus osteophyte anterior superior corner of L2, further evaluation with MRI of the lumbar spine recommended. 2. Moderate facet osteoarthropathy L3-S1 with moderate degenerative disc disease L5-S1. 3. Vascular calcifications abdominal aorta. Ct Head Wo Contrast    Result Date: 2020  Patient MRN: [id number] : 1940 Age:  [de-identified] years Order Date: 2020 11:00 AM. Gender: Male Exam: CT HEAD WO CONTRAST Number of Images: 183 Indication: Syncope, collapse that began 90 minutes ago. Contrast dosage: None Comparison: 2018 at CT Findings: This examination was performed on a CT scanner with dose reduction. Technique: Low-dose CT  acquisition technique included one of following options; 1 . Automated exposure control, 2. Adjustment of MA and or KV according to patient's size or 3. Use of iterative reconstruction. Vascular calcifications within the bilateral internal carotid artery cavernous segments and the vertebral arteries. . Moderate to moderately severe cerebral volume loss/atrophy. No subfalcine, transtentorial or tonsillar herniation or shift. No intraparenchymal hemorrhage. No extra-axial fluid collection or hematoma. Hypodensities bilateral centrum semiovale and periventricular regions. Empty sella. No Chiari malformation. Mucosal disease left maxillary sinus. No acute fracture or lytic or blastic bony lesion. Postoperative changes left orbit/optic globe. Airways and paranasal sinuses grossly unremarkable.      1. No CT evidence of acute intracranial abnormality, as questioned. 2. Moderate to moderately severe cerebral volume loss/atrophy with white matter changes consistent with sequelae small vessel ischemic disease. . 3. Vascular calcifications within the bilateral internal carotid artery cavernous segments and the vertebral arteries. . 4. Empty sella. Ct Chest W Contrast    Result Date: 6/29/2020  EXAMINATION:  CT CHEST WITH IV CONTRAST CLINICAL HISTORY:  PATIENT FELL WITH BLUNT CHEST TRAUMA, RIGHT SIDE CHEST PAIN COMPARISON:  NONE AVAILABLE TECHNIQUE:  CT chest obtained following IV injection 100 mL of Isovue-300. Axial and MPR CT images of the chest obtained. CT images are viewed with pulmonary, mediastinal and bone window settings. FINDINGS:  There is no thoracic aorta aneurysm or dissection. Pulmonary arteries appear normal and there is no pulmonary thromboembolic disease. The heart is not enlarged and there is no pericardial effusion. There are coronary artery calcifications. There is no mediastinal mass or adenopathy. There is no mediastinal hematoma. Lungs appear expanded and clear. No pulmonary contusion or pneumothorax. No pulmonary infiltrate or pleural effusion. There is bilateral posterior pleural thickening. CT scans with bone window settings demonstrate degenerative bone spurring throughout the thoracic spine and no thoracic spine fracture. Sternum appears intact. There are no displaced rib fractures. Remaining visualized bony thorax unremarkable. 1. NO ACUTE TRAUMATIC INJURY TO THE CHEST AS DESCRIBED IN BODY OF REPORT. 2. MEDIASTINAL STRUCTURES AND THORACIC AORTA INTACT. NO MEDIASTINAL HEMATOMA. 3. LUNGS APPEAR EXPANDED AND CLEAR WITHOUT A PULMONARY CONTUSION OR PNEUMOTHORAX. 4. DEGENERATIVE BONE SPURRING IN THE THORACIC SPINE AND NO THORACIC SPINE FRACTURE. 5. RIBS APPEAR INTACT AND NO CT EVIDENCE OF A DISPLACED RIB FRACTURE.  All CT scans at this facility use dose modulation, iterative reconstruction, and/or weight normal size prostate gland and seminal vesicles. Urinary bladder appears normal. No pelvic mass, adenopathy or \"free fluid\" in the pelvis. Also, there is no pelvic hematoma. CT scans with bone window settings demonstrate degenerative and arthritic changes in the lumbar spine. Pelvis appears intact and there is no pelvic fracture. Visualized portions of the hips appear unremarkable. 1. SOFT TISSUE CONTUSION INVOLVING THE RIGHT POSTEROLATERAL ABDOMINAL WALL. 2. 10 X 5 X 10 CM RIGHT POSTERIOR ABDOMINAL WALL HEMATOMA WITH L2 CHIP FRACTURE. 3. NO INTRAPERITONEAL OR RETROPERITONEAL HEMORRHAGE OR HEMATOMA. 4. NO ABDOMINAL ORGAN TRAUMATIC INJURY OR ABDOMINAL ORGAN LACERATION. 5. DEGENERATIVE BONE CHANGES IN THE LUMBAR SPINE AND NO LUMBAR SPINE FRACTURE. 6. NO PELVIC FRACTURE AND ADDITIONAL INCIDENTAL FINDINGS IN BODY OF REPORT. All CT scans at this facility use dose modulation, iterative reconstruction, and/or weight based dosing when appropriate to reduce radiation dose to as low as reasonably achievable. Xr Chest Portable    Result Date: 2020  XR CHEST PORTABLE Exam Date/Time:  2020 11:00 AM Clinical History:   syncope   R55 Syncope and collapse ICD10. Comparison:  10/15/2019  RESULT: Lungs and pleura:  No consolidation. No pleural effusion. No pneumothorax. Normal pulmonary vascular pattern. Cardiomediastinal silhouette:  Stable cardiomediastinal silhouette. Other:  No acute osseous findings. Remote fracture deformity left proximal humerus. No acute radiographic abnormality.      Us Carotid Artery Bilateral    Result Date: 2020   Patient MRN:  [id number] : 1940 Age: [de-identified] years Gender: Male Order Date:  2020 2:00 PM EXAM: US CAROTID ARTERY BILATERAL NUMBER OF IMAGES:  54 INDICATION:  syncope, vertigo  COMPARISON: Carotid artery ultrasound 2017 FINDINGS: Right side: Proximal common carotid artery peak systolic velocity is 599 centimeters per second Mid, common carotid artery peak systolic velocity is 645 centimeters per second. Distal common carotid artery peak systolic velocity is 86.5 centimeters per second External carotid artery peak systolic velocity is 28.2 centimeters per second. Proximal internal carotid artery peak systolic velocity is 37.5 centimeters per second Mid internal carotid artery peak systolic velocity is 94.2 centimeters per second. Distal internal carotid artery peak systolic velocity is 98.6 centimeters per second Vertebral artery peak systolic velocity is 93.3 centimeters per second. Vertebral artery flow is antegrade ICA/CCA ratio is 0.85 Left side: Proximal common carotid artery peak systolic velocity is 417 centimeters per second Mid, common carotid artery peak systolic velocity is 865 centimeters per second. Distal common carotid artery peak systolic velocity is 78.5 centimeters per second. External carotid artery peak systolic velocity is 61.2 centimeters per second. Proximal internal carotid artery peak systolic velocity is 78.9 centimeters per second Mid internal carotid artery peak systolic velocity is 38.6 centimeters per second. Distal internal carotid artery peak systolic velocity is 88.2 centimeters per second Vertebral artery peak systolic velocity is 03.0 centimeters per second. Vertebral artery flow is antegrade ICA/CCA ratio is 0.78. Scattered areas of calcified and noncalcified plaque bilateral internal carotid arteries and bilateral carotid bulb/bifurcations. Peak systolic velocities, ICA/CCA ratios and antegrade vertebral artery flow as above. See above for details of the examination and below for internal carotid artery interpretation guidelines. 1. No areas of estimated luminal stenosis greater than 0-49 percent within the bilateral internal carotid arteries. . 2. Scattered areas of calcified and noncalcified plaque bilateral internal carotid arteries and bilateral carotid artery bulb/bifurcations.  GOSINK CRITERIA Diameter          PSV t Component Value Date    TRIG 45 11/13/2019     11/13/2019    LDLCALC 74 11/13/2019     Lab Results   Component Value Date    LABAMPH Neg 07/14/2017    BARBSCNU Neg 07/14/2017    LABBENZ Neg 07/14/2017    OPIATESCREENURINE Neg 07/14/2017    PHENCYCLIDINESCREENURINE Neg 07/14/2017    ETOH 137 10/15/2019     No results found for: LITHIUM, DILFRTOT, VALPROATE    Assessment:   Could be related to low blood pressures. Patient's blood pressure recorded 84/56. Patient also has orthostatic hypotension. This either is primary autonomic dysfunction or truly may represents hypotension secondary to carbidopa levodopa and Parkinson's disease itself. For now we recommended discontinuation of all his blood pressure medications and starting him on midodrine 2.5 mg 3 times a day. We will watch his blood pressures today he may require higher dose of midodrine though he is best option with primary autonomic dysfunction would be Northera which can only be prescribed as an outpatient. Other causes of his underlying low blood pressure must be ruled out. Patient may benefit with short-term rehabilitation if this continues. Recommend teds hose for now and hydration. Concerns disease which is well controlled I do not appreciate any tremors or significant bradykinesia with this. Findings and further management has been discussed with the patient and his wife in detail. Jared Jimenez MD, Caro Sim, American Board of Psychiatry & Neurology  Board Certified in Vascular Neurology  Board Certified in Neuromuscular Medicine  Certified in [facility]:

## 2020-07-01 ENCOUNTER — HOSPITAL ENCOUNTER (INPATIENT)
Age: 80
LOS: 11 days | Discharge: HOME | DRG: 092 | End: 2020-07-12
Attending: PHYSICAL MEDICINE & REHABILITATION | Admitting: PHYSICAL MEDICINE & REHABILITATION
Payer: MEDICARE

## 2020-07-01 VITALS
HEART RATE: 89 BPM | OXYGEN SATURATION: 100 % | WEIGHT: 165.34 LBS | HEIGHT: 72 IN | BODY MASS INDEX: 22.4 KG/M2 | SYSTOLIC BLOOD PRESSURE: 102 MMHG | DIASTOLIC BLOOD PRESSURE: 58 MMHG | TEMPERATURE: 98.2 F | RESPIRATION RATE: 18 BRPM

## 2020-07-01 PROBLEM — R26.9 ABNORMALITY OF GAIT AND MOBILITY: Status: ACTIVE | Noted: 2020-07-01

## 2020-07-01 LAB
ABO/RH: NORMAL
ANION GAP SERPL CALCULATED.3IONS-SCNC: 14 MEQ/L (ref 9–15)
ANTIBODY SCREEN: NORMAL
BASOPHILS ABSOLUTE: 0 K/UL (ref 0–0.2)
BASOPHILS RELATIVE PERCENT: 0.9 %
BLOOD BANK DISPENSE STATUS: NORMAL
BLOOD BANK PRODUCT CODE: NORMAL
BPU ID: NORMAL
BUN BLDV-MCNC: 15 MG/DL (ref 8–23)
CALCIUM SERPL-MCNC: 7.4 MG/DL (ref 8.5–9.9)
CHLORIDE BLD-SCNC: 103 MEQ/L (ref 95–107)
CO2: 20 MEQ/L (ref 20–31)
CREAT SERPL-MCNC: 0.6 MG/DL (ref 0.7–1.2)
DESCRIPTION BLOOD BANK: NORMAL
EOSINOPHILS ABSOLUTE: 0.1 K/UL (ref 0–0.7)
EOSINOPHILS RELATIVE PERCENT: 1.8 %
GFR AFRICAN AMERICAN: >60
GFR NON-AFRICAN AMERICAN: >60
GLUCOSE BLD-MCNC: 97 MG/DL (ref 70–99)
HCT VFR BLD CALC: 21.8 % (ref 42–52)
HCT VFR BLD CALC: 26.4 % (ref 42–52)
HEMOGLOBIN: 7.3 G/DL (ref 14–18)
HEMOGLOBIN: 8.7 G/DL (ref 14–18)
LYMPHOCYTES ABSOLUTE: 0.9 K/UL (ref 1–4.8)
LYMPHOCYTES RELATIVE PERCENT: 18.5 %
MACROCYTES: ABNORMAL
MCH RBC QN AUTO: 36 PG (ref 27–31.3)
MCHC RBC AUTO-ENTMCNC: 33.4 % (ref 33–37)
MCV RBC AUTO: 107.8 FL (ref 80–100)
MONOCYTES ABSOLUTE: 0.4 K/UL (ref 0.2–0.8)
MONOCYTES RELATIVE PERCENT: 8.1 %
NEUTROPHILS ABSOLUTE: 3.4 K/UL (ref 1.4–6.5)
NEUTROPHILS RELATIVE PERCENT: 70.7 %
PDW BLD-RTO: 19.6 % (ref 11.5–14.5)
PLATELET # BLD: 91 K/UL (ref 130–400)
PLATELET SLIDE REVIEW: ABNORMAL
POTASSIUM REFLEX MAGNESIUM: 3.6 MEQ/L (ref 3.4–4.9)
RBC # BLD: 2.02 M/UL (ref 4.7–6.1)
SODIUM BLD-SCNC: 137 MEQ/L (ref 135–144)
WBC # BLD: 4.9 K/UL (ref 4.8–10.8)

## 2020-07-01 PROCEDURE — 36430 TRANSFUSION BLD/BLD COMPNT: CPT

## 2020-07-01 PROCEDURE — 86923 COMPATIBILITY TEST ELECTRIC: CPT

## 2020-07-01 PROCEDURE — 2580000003 HC RX 258: Performed by: INTERNAL MEDICINE

## 2020-07-01 PROCEDURE — 86901 BLOOD TYPING SEROLOGIC RH(D): CPT

## 2020-07-01 PROCEDURE — 99232 SBSQ HOSP IP/OBS MODERATE 35: CPT | Performed by: PHYSICAL MEDICINE & REHABILITATION

## 2020-07-01 PROCEDURE — APPSS30 APP SPLIT SHARED TIME 16-30 MINUTES: Performed by: NURSE PRACTITIONER

## 2020-07-01 PROCEDURE — 85018 HEMOGLOBIN: CPT

## 2020-07-01 PROCEDURE — 99233 SBSQ HOSP IP/OBS HIGH 50: CPT | Performed by: PSYCHIATRY & NEUROLOGY

## 2020-07-01 PROCEDURE — 80048 BASIC METABOLIC PNL TOTAL CA: CPT

## 2020-07-01 PROCEDURE — 6370000000 HC RX 637 (ALT 250 FOR IP): Performed by: INTERNAL MEDICINE

## 2020-07-01 PROCEDURE — 86900 BLOOD TYPING SEROLOGIC ABO: CPT

## 2020-07-01 PROCEDURE — 86850 RBC ANTIBODY SCREEN: CPT

## 2020-07-01 PROCEDURE — P9016 RBC LEUKOCYTES REDUCED: HCPCS

## 2020-07-01 PROCEDURE — 36415 COLL VENOUS BLD VENIPUNCTURE: CPT

## 2020-07-01 PROCEDURE — 2580000003 HC RX 258: Performed by: PHYSICIAN ASSISTANT

## 2020-07-01 PROCEDURE — 1180000000 HC REHAB R&B

## 2020-07-01 PROCEDURE — 85025 COMPLETE CBC W/AUTO DIFF WBC: CPT

## 2020-07-01 PROCEDURE — 85014 HEMATOCRIT: CPT

## 2020-07-01 PROCEDURE — 6370000000 HC RX 637 (ALT 250 FOR IP): Performed by: PHYSICAL MEDICINE & REHABILITATION

## 2020-07-01 RX ORDER — BRIMONIDINE TARTRATE 2 MG/ML
1 SOLUTION/ DROPS OPHTHALMIC 2 TIMES DAILY
Status: DISCONTINUED | OUTPATIENT
Start: 2020-07-01 | End: 2020-07-10

## 2020-07-01 RX ORDER — ACETAMINOPHEN 650 MG/1
650 SUPPOSITORY RECTAL EVERY 6 HOURS PRN
Status: DISCONTINUED | OUTPATIENT
Start: 2020-07-01 | End: 2020-07-12 | Stop reason: HOSPADM

## 2020-07-01 RX ORDER — SODIUM PHOSPHATE, DIBASIC AND SODIUM PHOSPHATE, MONOBASIC 7; 19 G/133ML; G/133ML
1 ENEMA RECTAL DAILY PRN
Status: CANCELLED | OUTPATIENT
Start: 2020-07-01

## 2020-07-01 RX ORDER — ONDANSETRON 2 MG/ML
4 INJECTION INTRAMUSCULAR; INTRAVENOUS EVERY 6 HOURS PRN
Status: CANCELLED | OUTPATIENT
Start: 2020-07-01

## 2020-07-01 RX ORDER — LATANOPROST 50 UG/ML
1 SOLUTION/ DROPS OPHTHALMIC NIGHTLY
Status: CANCELLED | OUTPATIENT
Start: 2020-07-01

## 2020-07-01 RX ORDER — ONDANSETRON 2 MG/ML
4 INJECTION INTRAMUSCULAR; INTRAVENOUS EVERY 6 HOURS PRN
Status: DISCONTINUED | OUTPATIENT
Start: 2020-07-01 | End: 2020-07-12 | Stop reason: HOSPADM

## 2020-07-01 RX ORDER — VITAMIN B COMPLEX
1000 TABLET ORAL DAILY
Status: DISCONTINUED | OUTPATIENT
Start: 2020-07-02 | End: 2020-07-02

## 2020-07-01 RX ORDER — LATANOPROST 50 UG/ML
1 SOLUTION/ DROPS OPHTHALMIC NIGHTLY
Status: DISCONTINUED | OUTPATIENT
Start: 2020-07-01 | End: 2020-07-12 | Stop reason: HOSPADM

## 2020-07-01 RX ORDER — CHOLECALCIFEROL (VITAMIN D3) 125 MCG
1000 CAPSULE ORAL DAILY
Status: DISCONTINUED | OUTPATIENT
Start: 2020-07-02 | End: 2020-07-12 | Stop reason: HOSPADM

## 2020-07-01 RX ORDER — CHOLECALCIFEROL (VITAMIN D3) 125 MCG
1000 CAPSULE ORAL DAILY
Status: CANCELLED | OUTPATIENT
Start: 2020-07-02

## 2020-07-01 RX ORDER — SODIUM CHLORIDE 0.9 % (FLUSH) 0.9 %
10 SYRINGE (ML) INJECTION EVERY 12 HOURS SCHEDULED
Status: CANCELLED | OUTPATIENT
Start: 2020-07-01

## 2020-07-01 RX ORDER — VITAMIN B COMPLEX
1000 TABLET ORAL DAILY
Status: CANCELLED | OUTPATIENT
Start: 2020-07-02

## 2020-07-01 RX ORDER — POLYETHYLENE GLYCOL 3350 17 G/17G
17 POWDER, FOR SOLUTION ORAL DAILY PRN
Status: CANCELLED | OUTPATIENT
Start: 2020-07-01

## 2020-07-01 RX ORDER — FOLIC ACID 1 MG/1
1000 TABLET ORAL DAILY
Status: DISCONTINUED | OUTPATIENT
Start: 2020-07-02 | End: 2020-07-12 | Stop reason: HOSPADM

## 2020-07-01 RX ORDER — MIDODRINE HYDROCHLORIDE 5 MG/1
5 TABLET ORAL
Status: CANCELLED | OUTPATIENT
Start: 2020-07-01

## 2020-07-01 RX ORDER — SODIUM CHLORIDE 0.9 % (FLUSH) 0.9 %
10 SYRINGE (ML) INJECTION PRN
Status: DISCONTINUED | OUTPATIENT
Start: 2020-07-01 | End: 2020-07-12 | Stop reason: HOSPADM

## 2020-07-01 RX ORDER — BRIMONIDINE TARTRATE 2 MG/ML
1 SOLUTION/ DROPS OPHTHALMIC 2 TIMES DAILY
Status: CANCELLED | OUTPATIENT
Start: 2020-07-01

## 2020-07-01 RX ORDER — FOLIC ACID 1 MG/1
1000 TABLET ORAL DAILY
Status: CANCELLED | OUTPATIENT
Start: 2020-07-02

## 2020-07-01 RX ORDER — ACETAMINOPHEN 325 MG/1
650 TABLET ORAL EVERY 6 HOURS PRN
Status: CANCELLED | OUTPATIENT
Start: 2020-07-01

## 2020-07-01 RX ORDER — MIDODRINE HYDROCHLORIDE 5 MG/1
5 TABLET ORAL
Status: DISCONTINUED | OUTPATIENT
Start: 2020-07-01 | End: 2020-07-01 | Stop reason: HOSPADM

## 2020-07-01 RX ORDER — PROMETHAZINE HYDROCHLORIDE 12.5 MG/1
12.5 TABLET ORAL EVERY 6 HOURS PRN
Status: DISCONTINUED | OUTPATIENT
Start: 2020-07-01 | End: 2020-07-12 | Stop reason: HOSPADM

## 2020-07-01 RX ORDER — ACETAMINOPHEN 650 MG/1
650 SUPPOSITORY RECTAL EVERY 6 HOURS PRN
Status: CANCELLED | OUTPATIENT
Start: 2020-07-01

## 2020-07-01 RX ORDER — SODIUM CHLORIDE 0.9 % (FLUSH) 0.9 %
10 SYRINGE (ML) INJECTION EVERY 12 HOURS SCHEDULED
Status: DISCONTINUED | OUTPATIENT
Start: 2020-07-01 | End: 2020-07-08

## 2020-07-01 RX ORDER — ACETAMINOPHEN 325 MG/1
650 TABLET ORAL EVERY 6 HOURS PRN
Status: DISCONTINUED | OUTPATIENT
Start: 2020-07-01 | End: 2020-07-12 | Stop reason: HOSPADM

## 2020-07-01 RX ORDER — SODIUM CHLORIDE 0.9 % (FLUSH) 0.9 %
10 SYRINGE (ML) INJECTION PRN
Status: CANCELLED | OUTPATIENT
Start: 2020-07-01

## 2020-07-01 RX ORDER — PROMETHAZINE HYDROCHLORIDE 12.5 MG/1
12.5 TABLET ORAL EVERY 6 HOURS PRN
Status: CANCELLED | OUTPATIENT
Start: 2020-07-01

## 2020-07-01 RX ORDER — SODIUM PHOSPHATE, DIBASIC AND SODIUM PHOSPHATE, MONOBASIC 7; 19 G/133ML; G/133ML
1 ENEMA RECTAL DAILY PRN
Status: DISCONTINUED | OUTPATIENT
Start: 2020-07-01 | End: 2020-07-12 | Stop reason: HOSPADM

## 2020-07-01 RX ORDER — 0.9 % SODIUM CHLORIDE 0.9 %
250 INTRAVENOUS SOLUTION INTRAVENOUS ONCE
Status: COMPLETED | OUTPATIENT
Start: 2020-07-01 | End: 2020-07-01

## 2020-07-01 RX ORDER — POLYETHYLENE GLYCOL 3350 17 G/17G
17 POWDER, FOR SOLUTION ORAL DAILY PRN
Status: DISCONTINUED | OUTPATIENT
Start: 2020-07-01 | End: 2020-07-12 | Stop reason: HOSPADM

## 2020-07-01 RX ORDER — MIDODRINE HYDROCHLORIDE 5 MG/1
5 TABLET ORAL
Status: DISCONTINUED | OUTPATIENT
Start: 2020-07-02 | End: 2020-07-09

## 2020-07-01 RX ADMIN — MIDODRINE HYDROCHLORIDE 2.5 MG: 5 TABLET ORAL at 09:19

## 2020-07-01 RX ADMIN — CARBIDOPA AND LEVODOPA 1 TABLET: 25; 100 TABLET ORAL at 09:19

## 2020-07-01 RX ADMIN — CYANOCOBALAMIN TAB 500 MCG 1000 MCG: 500 TAB at 09:19

## 2020-07-01 RX ADMIN — LATANOPROST 1 DROP: 50 SOLUTION OPHTHALMIC at 21:36

## 2020-07-01 RX ADMIN — FOLIC ACID 1000 MCG: 1 TABLET ORAL at 09:19

## 2020-07-01 RX ADMIN — CARBIDOPA AND LEVODOPA 1 TABLET: 25; 100 TABLET ORAL at 21:34

## 2020-07-01 RX ADMIN — MIDODRINE HYDROCHLORIDE 5 MG: 5 TABLET ORAL at 12:20

## 2020-07-01 RX ADMIN — Medication 10 ML: at 09:19

## 2020-07-01 RX ADMIN — VITAMIN D, TAB 1000IU (100/BT) 1000 UNITS: 25 TAB at 09:19

## 2020-07-01 RX ADMIN — SODIUM CHLORIDE 250 ML: 9 INJECTION, SOLUTION INTRAVENOUS at 11:13

## 2020-07-01 RX ADMIN — BRIMONIDINE TARTRATE 1 DROP: 2 SOLUTION OPHTHALMIC at 21:36

## 2020-07-01 RX ADMIN — CARBIDOPA AND LEVODOPA 1 TABLET: 25; 100 TABLET ORAL at 15:29

## 2020-07-01 RX ADMIN — NYSTATIN 500000 UNITS: 100000 SUSPENSION ORAL at 21:34

## 2020-07-01 RX ADMIN — MIDODRINE HYDROCHLORIDE 5 MG: 5 TABLET ORAL at 18:03

## 2020-07-01 RX ADMIN — Medication 10 ML: at 21:35

## 2020-07-01 RX ADMIN — BRIMONIDINE TARTRATE 1 DROP: 2 SOLUTION OPHTHALMIC at 09:23

## 2020-07-01 ASSESSMENT — ENCOUNTER SYMPTOMS
SORE THROAT: 0
BACK PAIN: 1
BLOOD IN STOOL: 0
DIARRHEA: 0
TROUBLE SWALLOWING: 0
WHEEZING: 0
CHEST TIGHTNESS: 0
SHORTNESS OF BREATH: 0
CONSTIPATION: 1
VOMITING: 0
VISUAL CHANGE: 0
EYE REDNESS: 0
BOWEL INCONTINENCE: 0
SHORTNESS OF BREATH: 1
NAUSEA: 0
EYE PAIN: 0
COUGH: 0
COLOR CHANGE: 0
PHOTOPHOBIA: 0
STRIDOR: 0

## 2020-07-01 ASSESSMENT — PAIN DESCRIPTION - LOCATION: LOCATION: BACK

## 2020-07-01 ASSESSMENT — PAIN DESCRIPTION - DESCRIPTORS: DESCRIPTORS: DISCOMFORT

## 2020-07-01 ASSESSMENT — PAIN DESCRIPTION - ORIENTATION: ORIENTATION: RIGHT;LOWER

## 2020-07-01 NOTE — DISCHARGE SUMMARY
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 *    Chief Complaint   Patient presents with    Loss of Consciousness     no fall or trauma         Condition: improved   Activity: no strenuous activity   Diet: regular  Disposition: Acute Rehab [facility]  Functional Status: ambulatory    Significant Findings:     Recent Labs     20  0548 20  1739 20  0707 20  1109   WBC 4.9  --  5.7 12.3*   HGB 7.3* 7.7* 7.2* 10.4*   HCT 21.8* 23.2* 21.4* 32.2*   .8*  --  111.1* 112.7*   PLT 91*  --  100* 136      Ct Abd/Pelvis:  1. SOFT TISSUE CONTUSION INVOLVING THE RIGHT POSTEROLATERAL ABDOMINAL WALL. 2. 10 X 5 X 10 CM RIGHT POSTERIOR ABDOMINAL WALL HEMATOMA WITH L2 CHIP FRACTURE. 3. NO INTRAPERITONEAL OR RETROPERITONEAL HEMORRHAGE OR HEMATOMA. 4. NO ABDOMINAL ORGAN TRAUMATIC INJURY OR ABDOMINAL ORGAN LACERATION. 5. DEGENERATIVE BONE CHANGES IN THE LUMBAR SPINE AND NO LUMBAR SPINE FRACTURE. 6. NO PELVIC FRACTURE AND ADDITIONAL INCIDENTAL FINDINGS IN BODY OF REPORT.          Hospital Course:   26-year-old male with Parkinson's disease, hypertension, calcified aortic valve, multiple recent falls presents after syncopal episode at home which occurred again while he was in wheelchair in the emergency department. Delfino Herrera was found to have a 10 cm posterior abdominal wall hematoma and L2 chip fracture.  He was admitted for evaluation of syncope which was found to be secondary to significant orthostatic hypotension due to autonomic dysfunction from Parkinson's. He was started on midodrine with improvement of symptoms. He was also found to have acute blood loss anemia secondary to posterior abdominal wall hematoma as reflected on CT scan above-this was treated with transfusions and will be monitored while he is at rehab. He did have L2 chip fracture which was evaluated by neurosurgery who felt that this does not require any surgical intervention. Key Medications / Medications changes:  D/c valsartan  Added midodrine 5mg tid  Consider addition of Northera as outpatient if orthostatic hypotension continues to be a problem    To Do:  [ ] monitor H&H- he has posterior abdominal wall hematoma that should be stabilizing- I will continue to follow and monitor this while patient is on rehab    Exam on Discharge:   BP (!) 94/46   Pulse 77   Temp 98.6 °F (37 °C)   Resp 18   Ht 6' (1.829 m)   Wt 165 lb 5.5 oz (75 kg)   SpO2 96%   BMI 22.42 kg/m²   General appearance: alert, cooperative and no distress  Mental Status: oriented to person, place and time and normal affect  Lungs: clear to auscultation bilaterally, normal effort  Heart: regular rate and rhythm, no murmur  Abdomen: soft, nontender, nondistended, bowel sounds present, no masses  Extremities: no edema, redness, tenderness in the calves  Skin: large ecchymoses by R hip. Tender    Discharge Medication List:   [facility]   Home Medication Instructions VDT:[id number]    Printed on:07/01/20 5907   Medication Information                      bimatoprost (LUMIGAN) 0.03 % ophthalmic drops  Place 1 drop into the left eye nightly. brimonidine (ALPHAGAN P) 0.1 % SOLN  Place 1 drop into both eyes 3 times daily.                carbidopa-levodopa (SINEMET)  MG per tablet  Take 1 tablet by mouth 3 times daily             Cholecalciferol (VITAMIN D3) 2000 units CAPS  Take 1,000 Units by mouth daily             Dorzolamide HCl-Timolol Mal (COSOPT OP)  Apply 1 drop to eye 2 times daily             folic acid (FOLVITE) 758 MCG tablet  Take 1 tablet by mouth daily              Omega-3 Fatty Acids (FISH OIL PO)  Take by mouth             vitamin B-12 (CYANOCOBALAMIN) 1000 MCG tablet  Take 1,000 mcg by mouth daily                Midodrine added    DC time 37 minutes    Signed:  Armando Garcia  7/1/2020, 1:44 PM

## 2020-07-01 NOTE — PROGRESS NOTES
11:39 AM  PRBC started with no reactions. Patient states no c/o dizziness or light headedness at this time. Dr. Temo Mccormack aware of hypotension recorded when beginning blood transfusion. Cont to monitor.

## 2020-07-01 NOTE — CARE COORDINATION
CALEB along with the care team met with the pt and his wife to discuss DC. Pt will be getting blood again today. He continues to work with therapy but still feels very weak. Pt would like to go home but will likely need rehab prior to home. STANTONW to follow. PT HAS BEEN ACCEPTED TO ACUTE REHAB AND WILL TRANSFER THERE TODAY TO ROOM 248.

## 2020-07-01 NOTE — PROGRESS NOTES
[facility] Neurology Daily Progress Note  Name: Anuel Nazario  Age: [de-identified] y.o. Gender: male  CodeStatus: Full Code  Allergies: Cat Hair Extract    Chief Complaint:Loss of Consciousness (no fall or trauma  )    Primary Care Provider: Farhana Aaron MD  InpatientTreatment Team: Treatment Team: Attending Provider: Rosa Stevens DO; Consulting Physician: Enrique Soni MD; Consulting Physician: Carlos Valenzuela MD; Utilization Reviewer: Santos Casas RN; Registered Nurse: Geeta Billy RN; : Key Piña RN  Admission Date: 6/29/2020      HPI   Pt seen and examined for neuro follow up for syncope secondary to hypotension and orthostatic hypotension in a patient with Parkinson's disease on Sinemet. Currently alert and oriented x3, no acute distress, cooperative. Currently receiving blood transfusion. No focal deficits. No seizure activity. No recurrent syncope. Patient has some minimal lightheadedness with sitting. Vitals:    07/01/20 1124   BP: (!) 94/46   Pulse: 77   Resp:    Temp:    SpO2: 96%      Review of Systems   Constitutional: Negative for appetite change, chills, fatigue and fever. HENT: Negative for hearing loss and trouble swallowing. Eyes: Negative for visual disturbance. Respiratory: Negative for cough, chest tightness, shortness of breath and wheezing. Cardiovascular: Negative for chest pain, palpitations and leg swelling. Gastrointestinal: Negative for nausea and vomiting. Musculoskeletal: Negative for gait problem. Skin: Negative for color change and rash. Neurological: Positive for light-headedness. Negative for dizziness, tremors, seizures, syncope, facial asymmetry, speech difficulty, weakness, numbness and headaches. Psychiatric/Behavioral: Negative for agitation, confusion and hallucinations. The patient is not nervous/anxious. Physical Exam  Vitals signs and nursing note reviewed. Constitutional:       General: He is not in acute distress. Appearance: He is not diaphoretic. HENT:      Head: Normocephalic and atraumatic. Eyes:      General: No visual field deficit. Extraocular Movements: Extraocular movements intact. Pupils: Pupils are equal, round, and reactive to light. Cardiovascular:      Rate and Rhythm: Normal rate and regular rhythm. Pulmonary:      Effort: Pulmonary effort is normal. No respiratory distress. Breath sounds: Normal breath sounds. Abdominal:      General: Bowel sounds are normal. There is no distension. Palpations: Abdomen is soft. Tenderness: There is no abdominal tenderness. Skin:     General: Skin is warm and dry. Neurological:      General: No focal deficit present. Mental Status: He is alert and oriented to person, place, and time. Cranial Nerves: No cranial nerve deficit, dysarthria or facial asymmetry. Motor: No weakness, tremor or seizure activity.       Coordination: Finger-Nose-Finger Test normal.               Medications:  Reviewed    Infusion Medications:   Scheduled Medications:    midodrine  5 mg Oral TID WC    sodium chloride flush  10 mL Intravenous 2 times per day    latanoprost  1 drop Both Eyes Nightly    brimonidine  1 drop Both Eyes BID    carbidopa-levodopa  1 tablet Oral TID    Vitamin D  1,000 Units Oral Daily    vitamin B-12  1,000 mcg Oral Daily    folic acid  4,679 mcg Oral Daily     PRN Meds: sodium chloride flush, acetaminophen **OR** acetaminophen, polyethylene glycol, promethazine **OR** ondansetron    Labs:   Recent Labs     06/29/20  1109 06/30/20  0707 06/30/20  1739 07/01/20  0548   WBC 12.3* 5.7  --  4.9   HGB 10.4* 7.2* 7.7* 7.3*   HCT 32.2* 21.4* 23.2* 21.8*    100*  --  91*     Recent Labs     06/29/20  1100 06/29/20  1118 06/30/20  0511 07/01/20  0548     --  136 137   K 4.5  --  3.7 3.6     --  103 103   CO2 17*  --  21 20   BUN 18  --  23 15   CREATININE 0.98 1.1 0.77 0.60*   CALCIUM 8.7  --  7.6* 7.4* No Chiari malformation. Mucosal disease left maxillary sinus. No acute fracture or lytic or blastic bony lesion. Postoperative changes left orbit/optic globe. Airways and paranasal sinuses grossly unremarkable. Impression 1. No CT evidence of acute intracranial abnormality, as questioned. 2. Moderate to moderately severe cerebral volume loss/atrophy with white matter changes consistent with sequelae small vessel ischemic disease. .  3. Vascular calcifications within the bilateral internal carotid artery cavernous segments and the vertebral arteries. .  4. Empty sella. No results found for this or any previous visit. No results found for this or any previous visit. Carotid duplex: No results found for this or any previous visit. No results found for this or any previous visit. Results for orders placed during the hospital encounter of 20   US CAROTID ARTERY BILATERAL    Narrative    Patient MRN:  [id number]    : 1940    Age: [de-identified] years    Gender: Male    Order Date:  2020 2:00 PM    EXAM: US CAROTID ARTERY BILATERAL    NUMBER OF IMAGES:  55    INDICATION:  syncope, vertigo      COMPARISON: Carotid artery ultrasound 2017    FINDINGS:   Right side:  Proximal common carotid artery peak systolic velocity is 598 centimeters per second  Mid, common carotid artery peak systolic velocity is 313 centimeters per second. Distal common carotid artery peak systolic velocity is 46.3 centimeters per second    External carotid artery peak systolic velocity is 91.8 centimeters per second. Proximal internal carotid artery peak systolic velocity is 11.5 centimeters per second  Mid internal carotid artery peak systolic velocity is 07.6 centimeters per second. Distal internal carotid artery peak systolic velocity is 63.1 centimeters per second    Vertebral artery peak systolic velocity is 38.2 centimeters per second.   Vertebral artery flow is antegrade    ICA/CCA ratio is 0.85    Left side:  Proximal common carotid artery peak systolic velocity is 065 centimeters per second  Mid, common carotid artery peak systolic velocity is 393 centimeters per second. Distal common carotid artery peak systolic velocity is 84.9 centimeters per second. External carotid artery peak systolic velocity is 05.0 centimeters per second. Proximal internal carotid artery peak systolic velocity is 56.3 centimeters per second  Mid internal carotid artery peak systolic velocity is 99.5 centimeters per second. Distal internal carotid artery peak systolic velocity is 21.4 centimeters per second    Vertebral artery peak systolic velocity is 25.6 centimeters per second. Vertebral artery flow is antegrade    ICA/CCA ratio is 0.78. Scattered areas of calcified and noncalcified plaque bilateral internal carotid arteries and bilateral carotid bulb/bifurcations. Impression Peak systolic velocities, ICA/CCA ratios and antegrade vertebral artery flow as above. See above for details of the examination and below for internal carotid artery interpretation guidelines. 1. No areas of estimated luminal stenosis greater than 0-49 percent within the bilateral internal carotid arteries. .  2. Scattered areas of calcified and noncalcified plaque bilateral internal carotid arteries and bilateral carotid artery bulb/bifurcations.     GOSINK CRITERIA    Diameter          PSV t            EDV t          PSV          EDV          Stenosis Site        %              cm/sec          cm/sec      ICA/CCA    ICA/CCA    0-49                 <124              <40             <2:1           ---                 ---    50-69              125-225                  >2:1           ---                 ---    70-89               225-325          >100          >4:1           >5:1              ---    90%+                >325              >100          >4:1           >9:1           Damped resistive CCA    >95%               May be            May be      Damped ---              Damped resistive CCA                        decreased      decreased  resistive CCA      Validated velocity measurements with angiographic measurements, velocity criteria are extrapolated from diameter data as defined by the Society of Radiologist in [address] [place] [address] [address] [facility] 2003; [phone]. Echo No results found for this or any previous visit. Assessment/Plan:  Syncope secondary to hypotension  Orthostatic hypotension also noted  Primary autonomic dysfunction versus effects of Sinemet and Parkinson's disease itself  Blood pressure medications discontinued  Midodrine 2.5 mg 3 times daily  Will consider Northera as outpatient  BRIAN hose  History of Parkinson's disease with good symptom management at this time  Acute blood loss anemia secondary to abdominal wall hematoma status post transfusion  Echocardiogram done with ejection fraction 55% no significant valvular abnormalities  Rehab eval pending    I independently performed an evaluation on this patient. I have reviewed the above documentation completed by the Nurse Practitioner. Please see my additional contributions to the HPI, physical exam, assessment/medical decision making. Jared Alvarez MD, [address], American Board of Psychiatry & Neurology  Board Certified in Vascular Neurology  Board Certified in Neuromuscular Medicine  Certified in Neurorehabilitation       Collaborating physicians: Dr Juan F Alvarez    Electronically signed by NANCY Abraham CNP on 7/1/2020 at 12:53 PM

## 2020-07-01 NOTE — PROGRESS NOTES
Subjective: The patient complains of severe  acute on chronic progressive rigidity and tremor secondary to Parkinson's disease partially relieved by PT, OT, dopaminergic medications and exacerbated by anemia. I am concerned about patients need for blood transfusion. ROS x10: The patient also complains of severely impaired mobility and activities of daily living. Otherwise no new problems with vision, hearing, nose, mouth, throat, dermal, cardiovascular, GI, , pulmonary, musculoskeletal, psychiatric or neurological. See Rehab consult on Rehab chart . Vital signs:  BP (!) 94/46   Pulse 77   Temp 98.6 °F (37 °C)   Resp 18   Ht 6' (1.829 m)   Wt 165 lb 5.5 oz (75 kg)   SpO2 96%   BMI 22.42 kg/m²   I/O:   PO/Intake:    fair PO intake, no problems observed or reported. Bowel/Bladder:  continent, no problems noted. General:  Patient is well developed, adequately nourished, non-obese and     well kempt. HEENT:    PERRLA, hearing intact to loud voice, external inspection of ear     and nose benign. Inspection of lips, tongue and gums benign  Musculoskeletal: No significant change in strength or tone. All joints stable. Inspection and palpation of digits and nails show no clubbing,       cyanosis or inflammatory conditions. Neuro/Psychiatric: Affect: flat but pleasant. Alert and oriented to person, place and     situation with min cues. No significant change in deep tendon reflexes or     Sensation-parkinsonian masked facies and upper extremity rigidity  Lungs:  Diminished, CTA-B. Respiration effort is normal at rest.     Heart:   S1 = S2,  RRR. No loud murmurs. Abdomen:  Soft, non-tender, no enlargement of liver or spleen. Extremities:  No significant lower extremity edema or tenderness. Skin:    BUE bruises dt blood draws, no visualized or palpated problems.     Rehabilitation:  Physical therapy: FIMS:  Bed Mobility:      Transfers: Sit to Stand: Minimal Assistance  Stand to sit: Contact guard assistance,  ,      FIMS:  ,  , Assessment: Pt unable to complete full mobility assessment d/t symptomatic orthostatic hypotension. pt admitted following fall however prior to admission, pt reports high level of indep. Continued PT indicated to prevent further functional decline, to progress mobility in house, and facilitate DC at highest level of indep and safety. Concerns at this time for pt returning home d/t falls risk and inability to ambulate. Will follow and update as pt progresses. Occupational therapy: FIMS:   ,  , Assessment: Pt is an [de-identified] y/o male from home with spouse who presents to [facility] with the above deficits which impact his independence and safety during ADLs and IADLs. Pt would benefit from OT services to maximize safety during self care.     Speech therapy: FIMS:        Lab/X-ray studies reviewed, analyzed and discussed with patient and staff:   Recent Results (from the past 24 hour(s))   Hemoglobin and Hematocrit, Blood    Collection Time: 06/30/20  5:39 PM   Result Value Ref Range    Hemoglobin 7.7 (L) 14.0 - 18.0 g/dL    Hematocrit 23.2 (L) 42.0 - 52.0 %   CBC auto differential    Collection Time: 07/01/20  5:48 AM   Result Value Ref Range    WBC 4.9 4.8 - 10.8 K/uL    RBC 2.02 (L) 4.70 - 6.10 M/uL    Hemoglobin 7.3 (L) 14.0 - 18.0 g/dL    Hematocrit 21.8 (L) 42.0 - 52.0 %    .8 (H) 80.0 - 100.0 fL    MCH 36.0 (H) 27.0 - 31.3 pg    MCHC 33.4 33.0 - 37.0 %    RDW 19.6 (H) 11.5 - 14.5 %    Platelets 91 (L) 724 - 400 K/uL    PLATELET SLIDE REVIEW Decreased     Neutrophils % 70.7 %    Lymphocytes % 18.5 %    Monocytes % 8.1 %    Eosinophils % 1.8 %    Basophils % 0.9 %    Neutrophils Absolute 3.4 1.4 - 6.5 K/uL    Lymphocytes Absolute 0.9 (L) 1.0 - 4.8 K/uL    Monocytes Absolute 0.4 0.2 - 0.8 K/uL    Eosinophils Absolute 0.1 0.0 - 0.7 K/uL    Basophils Absolute 0.0 0.0 - 0.2 K/uL    Macrocytes 1+    Basic Metabolic Panel w/ Reflex to MG    Collection Time: 07/01/20 add protein supplementation with low carb content. 6. Complex discharge planning:   I believe he will be cleared to come to rehab today after his transfusion I will add iron and B12 once he gets to rehab I will do a discharge rehab orders. Complex Active General Medical Issues that complicate care Assess & Plan:     1. 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  Skye Jacinto D.O., PM&R     Attending    [facility] Carole Ricci

## 2020-07-01 NOTE — PLAN OF CARE
Problem: Pain:  Goal: Pain level will decrease  Outcome: Ongoing  Goal: Control of acute pain  Outcome: Ongoing  Goal: Control of chronic pain  Outcome: Ongoing     Problem: Skin Integrity:  Goal: Will show no infection signs and symptoms  Outcome: Ongoing  Goal: Absence of new skin breakdown  Outcome: Ongoing     Problem: Falls - Risk of:  Goal: Will remain free from falls  Outcome: Ongoing  Goal: Absence of physical injury  Outcome: Ongoing

## 2020-07-02 LAB
HCT VFR BLD CALC: 23.7 % (ref 42–52)
HEMOGLOBIN: 7.9 G/DL (ref 14–18)
URINE CULTURE, ROUTINE: NORMAL

## 2020-07-02 PROCEDURE — 6370000000 HC RX 637 (ALT 250 FOR IP): Performed by: PHYSICAL MEDICINE & REHABILITATION

## 2020-07-02 PROCEDURE — 85025 COMPLETE CBC W/AUTO DIFF WBC: CPT

## 2020-07-02 PROCEDURE — 97110 THERAPEUTIC EXERCISES: CPT

## 2020-07-02 PROCEDURE — 92610 EVALUATE SWALLOWING FUNCTION: CPT

## 2020-07-02 PROCEDURE — 85018 HEMOGLOBIN: CPT

## 2020-07-02 PROCEDURE — 92523 SPEECH SOUND LANG COMPREHEN: CPT

## 2020-07-02 PROCEDURE — 85014 HEMATOCRIT: CPT

## 2020-07-02 PROCEDURE — 6370000000 HC RX 637 (ALT 250 FOR IP): Performed by: INTERNAL MEDICINE

## 2020-07-02 PROCEDURE — 97535 SELF CARE MNGMENT TRAINING: CPT

## 2020-07-02 PROCEDURE — 36415 COLL VENOUS BLD VENIPUNCTURE: CPT

## 2020-07-02 PROCEDURE — 99223 1ST HOSP IP/OBS HIGH 75: CPT | Performed by: PHYSICAL MEDICINE & REHABILITATION

## 2020-07-02 PROCEDURE — 97530 THERAPEUTIC ACTIVITIES: CPT

## 2020-07-02 PROCEDURE — 97116 GAIT TRAINING THERAPY: CPT

## 2020-07-02 PROCEDURE — 96125 COGNITIVE TEST BY HC PRO: CPT

## 2020-07-02 PROCEDURE — 6360000002 HC RX W HCPCS: Performed by: PHYSICAL MEDICINE & REHABILITATION

## 2020-07-02 PROCEDURE — 1180000000 HC REHAB R&B

## 2020-07-02 PROCEDURE — 97166 OT EVAL MOD COMPLEX 45 MIN: CPT

## 2020-07-02 PROCEDURE — 97162 PT EVAL MOD COMPLEX 30 MIN: CPT

## 2020-07-02 PROCEDURE — 2580000003 HC RX 258: Performed by: INTERNAL MEDICINE

## 2020-07-02 PROCEDURE — 97129 THER IVNTJ 1ST 15 MIN: CPT

## 2020-07-02 RX ORDER — HYDROCODONE BITARTRATE AND ACETAMINOPHEN 5; 325 MG/1; MG/1
1 TABLET ORAL EVERY 4 HOURS PRN
Status: DISCONTINUED | OUTPATIENT
Start: 2020-07-02 | End: 2020-07-12 | Stop reason: HOSPADM

## 2020-07-02 RX ORDER — ANALGESIC BALM 1.74; 4.06 G/29G; G/29G
OINTMENT TOPICAL 3 TIMES DAILY
Status: DISCONTINUED | OUTPATIENT
Start: 2020-07-02 | End: 2020-07-12 | Stop reason: HOSPADM

## 2020-07-02 RX ORDER — LIDOCAINE 4 G/G
3 PATCH TOPICAL DAILY
Status: DISCONTINUED | OUTPATIENT
Start: 2020-07-02 | End: 2020-07-12 | Stop reason: HOSPADM

## 2020-07-02 RX ORDER — VITAMIN B COMPLEX
2000 TABLET ORAL
Status: DISCONTINUED | OUTPATIENT
Start: 2020-07-02 | End: 2020-07-12 | Stop reason: HOSPADM

## 2020-07-02 RX ORDER — CYANOCOBALAMIN 1000 UG/ML
1000 INJECTION INTRAMUSCULAR; SUBCUTANEOUS WEEKLY
Status: DISCONTINUED | OUTPATIENT
Start: 2020-07-02 | End: 2020-07-12 | Stop reason: HOSPADM

## 2020-07-02 RX ORDER — UBIDECARENONE 100 MG
100 CAPSULE ORAL
Status: DISCONTINUED | OUTPATIENT
Start: 2020-07-02 | End: 2020-07-12 | Stop reason: HOSPADM

## 2020-07-02 RX ORDER — FERROUS SULFATE 325(65) MG
325 TABLET ORAL 2 TIMES DAILY WITH MEALS
Status: DISCONTINUED | OUTPATIENT
Start: 2020-07-02 | End: 2020-07-12 | Stop reason: HOSPADM

## 2020-07-02 RX ADMIN — BRIMONIDINE TARTRATE 1 DROP: 2 SOLUTION OPHTHALMIC at 21:02

## 2020-07-02 RX ADMIN — Medication 100 MG: at 08:23

## 2020-07-02 RX ADMIN — NYSTATIN 500000 UNITS: 100000 SUSPENSION ORAL at 13:01

## 2020-07-02 RX ADMIN — Medication 100 MG: at 16:34

## 2020-07-02 RX ADMIN — FERROUS SULFATE TAB 325 MG (65 MG ELEMENTAL FE) 325 MG: 325 (65 FE) TAB at 09:04

## 2020-07-02 RX ADMIN — BRIMONIDINE TARTRATE 1 DROP: 2 SOLUTION OPHTHALMIC at 08:23

## 2020-07-02 RX ADMIN — CYANOCOBALAMIN 1000 MCG: 1000 INJECTION, SOLUTION INTRAMUSCULAR at 08:15

## 2020-07-02 RX ADMIN — LATANOPROST 1 DROP: 50 SOLUTION OPHTHALMIC at 21:02

## 2020-07-02 RX ADMIN — FERROUS SULFATE TAB 325 MG (65 MG ELEMENTAL FE) 325 MG: 325 (65 FE) TAB at 16:34

## 2020-07-02 RX ADMIN — MENTHOL AND METHYL SALICYLATE: 7.6; 29 OINTMENT TOPICAL at 13:00

## 2020-07-02 RX ADMIN — CARBIDOPA AND LEVODOPA 1 TABLET: 25; 100 TABLET ORAL at 13:00

## 2020-07-02 RX ADMIN — NYSTATIN 500000 UNITS: 100000 SUSPENSION ORAL at 08:15

## 2020-07-02 RX ADMIN — VITAMIN D, TAB 1000IU (100/BT) 2000 UNITS: 25 TAB at 16:34

## 2020-07-02 RX ADMIN — CYANOCOBALAMIN TAB 500 MCG 1000 MCG: 500 TAB at 08:14

## 2020-07-02 RX ADMIN — FOLIC ACID 1000 MCG: 1 TABLET ORAL at 08:14

## 2020-07-02 RX ADMIN — MIDODRINE HYDROCHLORIDE 5 MG: 5 TABLET ORAL at 12:18

## 2020-07-02 RX ADMIN — CARBIDOPA AND LEVODOPA 1 TABLET: 25; 100 TABLET ORAL at 08:14

## 2020-07-02 RX ADMIN — CARBIDOPA AND LEVODOPA 1 TABLET: 25; 100 TABLET ORAL at 21:02

## 2020-07-02 RX ADMIN — MIDODRINE HYDROCHLORIDE 5 MG: 5 TABLET ORAL at 16:34

## 2020-07-02 RX ADMIN — Medication 10 ML: at 09:05

## 2020-07-02 RX ADMIN — MIDODRINE HYDROCHLORIDE 5 MG: 5 TABLET ORAL at 08:15

## 2020-07-02 RX ADMIN — Medication 10 ML: at 21:03

## 2020-07-02 RX ADMIN — NYSTATIN 500000 UNITS: 100000 SUSPENSION ORAL at 21:02

## 2020-07-02 RX ADMIN — MENTHOL AND METHYL SALICYLATE: 7.6; 29 OINTMENT TOPICAL at 21:02

## 2020-07-02 ASSESSMENT — PAIN SCALES - GENERAL
PAINLEVEL_OUTOF10: 0
PAINLEVEL_OUTOF10: 0
PAINLEVEL_OUTOF10: 1
PAINLEVEL_OUTOF10: 5

## 2020-07-02 ASSESSMENT — ENCOUNTER SYMPTOMS
PHOTOPHOBIA: 0
SORE THROAT: 0
WHEEZING: 0
EYE PAIN: 0
SHORTNESS OF BREATH: 1
NAUSEA: 0
EYE REDNESS: 0
CONSTIPATION: 1
ABDOMINAL PAIN: 0
DIARRHEA: 0
BLOOD IN STOOL: 0
VOMITING: 0
STRIDOR: 0
COUGH: 0
BACK PAIN: 1

## 2020-07-02 ASSESSMENT — PAIN DESCRIPTION - DESCRIPTORS: DESCRIPTORS: CONSTANT;DISCOMFORT

## 2020-07-02 ASSESSMENT — PAIN DESCRIPTION - PAIN TYPE: TYPE: ACUTE PAIN

## 2020-07-02 ASSESSMENT — PAIN DESCRIPTION - LOCATION: LOCATION: BACK

## 2020-07-02 ASSESSMENT — PAIN DESCRIPTION - FREQUENCY: FREQUENCY: CONTINUOUS

## 2020-07-02 ASSESSMENT — PAIN DESCRIPTION - ORIENTATION: ORIENTATION: RIGHT;LOWER

## 2020-07-02 NOTE — FLOWSHEET NOTE
Patient lives with his wife, she was concerned tonight because of his confusion. She said this was new. He was incontinent of urine when standing over the toilet. He is cont. Of bowel. Patient has thrush, nystatin swish and swallow was ordered. Patient dawkins a large red hematoma on his right flank/back . No other skin issues besides bruising on his BUE.

## 2020-07-02 NOTE — PROGRESS NOTES
Patient was supposed to receive platelets yesterday but did not get them. Ilya Ramirez to see if he still wanted the platelets infused and he said that he does. Called blood bank to retrieve platelets and they had to use this patient's platelets for another patient. Waiting for a new bag to be prepared.  Electronically signed by Brendan Cummins RN on 7/2/2020 at 1:07 PM

## 2020-07-02 NOTE — PROGRESS NOTES
Occupational Therapy  Facility/Department: [facility]  Daily Treatment Note  NAME: Milton Beltre  : 1940  MRN: [id number]    Date of Service: 2020    Discharge Recommendations:  Continue to assess pending progress       Assessment   Performance deficits / Impairments: Decreased functional mobility ; Decreased strength;Decreased endurance;Decreased coordination;Decreased ADL status; Decreased high-level IADLs;Decreased fine motor control;Decreased balance;Decreased cognition;Decreased safe awareness  Assessment: Pt. is an [de-identified]year old man from home with spouse who presents to [facility] with the above deficits impacting his safety and mobility with ADL tasks. Pt. continues to benefit from OT to maximize independence and safety with ADL tasks. Prognosis: Good  Decision Making: Medium Complexity  History: Pt's medical history is moderately complex  Exam: Pt. has 10 performance deficits  Assistance / Modification: Pt. requires min A  OT Education: OT Role;Plan of Care  REQUIRES OT FOLLOW UP: Yes  Activity Tolerance  Activity Tolerance: Patient Tolerated treatment well  Activity Tolerance: Pt. continues to require safety cues  Safety Devices  Safety Devices in place: Yes  Type of devices: All fall risk precautions in place         Patient Diagnosis(es): There were no encounter diagnoses. has a past medical history of Actinic keratoses, Aortic valve stenosis, severe, BPH (benign prostatic hyperplasia), Cancer ([address] Colon polyp, Diastolic dysfunction, ED (erectile dysfunction), Glaucoma, Hemochromatosis, History of echocardiogram, History of echocardiogram, Hypertension, Iron deficiency anemia, LVH (left ventricular hypertrophy), Macular degeneration, Mass on back, Osteoarthritis cervical spine, and Parkinson disease ([address] has a past surgical history that includes Colonoscopy (2009); back surgery; Cataract removal; eye surgery (Bilateral);  Tonsillectomy; other surgical history (06/08/15); and Cardiac catheterization (11/09/2017). Restrictions  Restrictions/Precautions  Restrictions/Precautions: Fall Risk  Subjective   General  Chart Reviewed: Yes  Patient assessed for rehabilitation services?: Yes  Referring Practitioner: Dr. Elder Jeong  Diagnosis: Impaired mobility and ADLs 2° to severe parkinson's disease with blunt chest trauma  Pain Assessment  Pain Level: 5  Pre Treatment Pain Screening  Pain at present: 5  Scale Used: Numeric Score  Intervention List: Patient able to continue with treatment   Objective    Pt seen in therapy gym and participated in activity focused on B UE strengthening and coordination, as well as cognition. Pt required moderate assistance to assemble conduit pieces as he had difficulty problem solving how to assemble pieces as well which pieces go together. Pt required prompting to identify errors. Pt also completed SLUMS evaluation (see below). Pt reported pain in lower back at 5/10. Cognition  Overall Cognitive Status: Exceptions  Arousal/Alertness: Appropriate responses to stimuli  Following Commands: Follows all commands without difficulty  Attention Span: Appears intact  Memory: Appears intact  Safety Judgement: Good awareness of safety precautions  Problem Solving: Assistance required to generate solutions;Assistance required to correct errors made;Assistance required to implement solutions;Assistance required to identify errors made  Insights: Decreased awareness of deficits  Initiation: Requires cues for some  Sequencing: Requires cues for some  Cognition Comment: Comprehension: Mod I, Expression: Independent, Social interaction: Independent, Problem Solving: Min A, Memory- Supervision    The patient completed the [employer] Mental Status (SLUMS) Examination on this date, which is a useful screening tool for detecting mild cognitive impairment and signs of dementia.   Range of impairments based on score are indicated in the chart below:      High school education

## 2020-07-02 NOTE — PLAN OF CARE
Problem: Falls - Risk of:  Goal: Will remain free from falls  Description: Will remain free from falls  Outcome: Ongoing  Goal: Absence of physical injury  Description: Absence of physical injury  Outcome: Ongoing     Problem: Skin Integrity:  Goal: Will show no infection signs and symptoms  Description: Will show no infection signs and symptoms  Outcome: Ongoing  Goal: Absence of new skin breakdown  Description: Absence of new skin breakdown  Outcome: Ongoing     Problem: Mobility - Impaired:  Goal: Mobility will improve  Description: Mobility will improve  Outcome: Ongoing     Problem: IP COMMUNICATION/DYSARTHRIA  Goal: LTG - patient will improve expressive language skills to allow for communication of wants and needs in daily activities  7/2/2020 1930 by Andi An RN  Outcome: Ongoing  7/2/2020 1152 by JAMES Mims  Outcome: Ongoing  Goal: LTG - patient will utilize compensatory intervention for intelligibility  7/2/2020 1930 by Andi An RN  Outcome: Ongoing  7/2/2020 1152 by Mary Jane Hazel SLP  Outcome: Ongoing     Problem: IP SWALLOWING  Goal: LTG - patient will tolerate the least restrictive diet consistency to allow for safe consumption of daily meals  7/2/2020 1930 by Andi An RN  Outcome: Ongoing  7/2/2020 1152 by Mary Jane Hazel, SLP  Outcome: Ongoing     Problem: IP BALANCE  Goal: LTG - Patient will maintain balance to allow for safe/functional mobility  Outcome: Ongoing

## 2020-07-02 NOTE — PROGRESS NOTES
Patient has slept at intervals. Needs reminders to call for assistance. Likes to stand at side of bed to void. Patient is unsteady and forgetful.

## 2020-07-02 NOTE — PROGRESS NOTES
Occupational Therapy Initial Assessment  Date: 2020   Patient Name: Nicolas Go  MRN: [id number]     : 1940    Date of Service: 2020    Discharge Recommendations:  Continue to assess pending progress  OT Equipment Recommendations  Other: Continue to assess    Assessment   Performance deficits / Impairments: Decreased functional mobility ; Decreased strength;Decreased endurance;Decreased coordination;Decreased ADL status; Decreased high-level IADLs;Decreased fine motor control;Decreased balance;Decreased cognition;Decreased safe awareness  Assessment: Pt. is an [de-identified]year old man from home with spouse who presents to [facility] with the above deficits impacting his safety and mobility with ADL tasks. Pt. continues to benefit from OT to maximize independence and safety with ADL tasks. Prognosis: Good  Decision Making: Medium Complexity  History: Pt's medical history is moderately complex  Exam: Pt. has 10 performance deficits  Assistance / Modification: Pt. requires min A  OT Education: OT Role;Plan of Care  REQUIRES OT FOLLOW UP: Yes  Activity Tolerance  Activity Tolerance: Patient Tolerated treatment well  Activity Tolerance: Pt. continues to require safety cues  Safety Devices  Safety Devices in place: Yes  Type of devices: All fall risk precautions in place           Patient Diagnosis(es): There were no encounter diagnoses. has a past medical history of Actinic keratoses, Aortic valve stenosis, severe, BPH (benign prostatic hyperplasia), Cancer ([address] Colon polyp, Diastolic dysfunction, ED (erectile dysfunction), Glaucoma, Hemochromatosis, History of echocardiogram, History of echocardiogram, Hypertension, Iron deficiency anemia, LVH (left ventricular hypertrophy), Macular degeneration, Mass on back, Osteoarthritis cervical spine, and Parkinson disease ([address] has a past surgical history that includes Colonoscopy (2009); back surgery; Cataract removal; eye surgery (Bilateral);  Tonsillectomy; other surgical history (06/08/15); and Cardiac catheterization (11/09/2017). Restrictions  Restrictions/Precautions  Restrictions/Precautions: Fall Risk    Subjective   General  Chart Reviewed: Yes  Patient assessed for rehabilitation services?: Yes  Referring Practitioner: Dr. Dorothy Ellis  Diagnosis: Impaired mobility and ADLs 2° to severe parkinson's disease with blunt chest trauma  Patient Currently in Pain: No  Pain Assessment  Pain Assessment: (denies)  Pain Level: 1  Pain Type: Acute pain  Pain Location: Back  Pain Orientation: Right; Lower  Pain Descriptors: Constant; Discomfort  Pain Frequency: Continuous  Vital Signs  Patient Currently in Pain: No     Social/Functional History  Social/Functional History  Lives With: Spouse  Type of Home: House  Home Layout: Two level, Bed/Bath upstairs, 1/2 bath on main level(handrail both sides for steps to second floor )  Home Access: Level entry  Bathroom Shower/Tub: Walk-in shower  Bathroom Toilet: Standard  Bathroom Equipment: Grab bars in shower, Hand-held shower(plans to buy shower chair)  Home Equipment: Rolling walker(rollator )  Receives Help From: Family  ADL Assistance: Independent  Homemaking Assistance: Independent  Homemaking Responsibilities: Yes  Ambulation Assistance: Independent(cane or rollator prn)  Transfer Assistance: Independent(occassional help with bed mobility on \"bad days\")  Active : No  Education: 2 years of college  Occupation: Retired  Type of occupation: [employer]  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 reports he enjoys walking. 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  Pt seeming frustrated and anxious about the need for him to be discharged today. Hyperfocused on subject requiring frequent redirection. Edema: R lower back large hematoma     Balance  Sitting Balance: Stand by assistance  Standing Balance: Contact guard assistance(SBA to CGA)  Functional Mobility  Functional - Mobility Device: Cane  Activity: To/from bathroom  Assist Level: Contact guard assistance  Functional Mobility Comments: SBA to intermittent CGA, decreased overall safety awareness. ADL  Feeding: Independent  Grooming: Setup  UE Bathing: Setup  LE Bathing: Minimal assistance  UE Dressing: Setup  LE Dressing: Minimal assistance  Toileting: Minimal assistance  Additional Comments: Pt. engaged in shower ADL as above. Pt. demonstrates mild LOB and safety concerns during mobility; reports dizziness during mobility and requires cues for sequencing. Toilet Transfers  Toilet - Technique: Ambulating  Equipment Used: Grab bars  Toilet Transfer: Stand by assistance  Shower Transfers  Shower - Transfer From: Lorence Glance - Transfer Type: To and From  Shower - Transfer To: Shower seat with back  Shower - Technique: Ambulating  Shower Transfers: Contact Guard  Shower Transfers Comments: SBA to CGA, increased time, heavy use of grab bars    Tone RUE  RUE Tone: Normotonic  Tone LUE  LUE Tone: Normotonic  Coordination  Movements Are Fluid And Coordinated: No  Coordination and Movement description: Fine motor impairments;Decreased accuracy; Right UE;Left UE;Tremors     Bed mobility  Comment: Pt. up in chair  Transfers  Sit to stand: Supervision  Stand to sit: Supervision  Vision - Basic Assessment  Prior Vision: Wears glasses only for reading  Visual History: Macular degeneration;Glaucoma; Cataracts; Corrective eye surgery(Pt reports visual surgeries)  Patient Visual Report: No visual complaint reported. Visual Field Cut: No  Oculo Motor Control:  WNL  Vision Comments: R eye blind; has been since second cataract sx Cognition  Overall Cognitive Status: Exceptions  Arousal/Alertness: Appropriate responses to stimuli  Following Commands: Follows all commands without difficulty  Attention Span: Appears intact  Memory: Appears intact  Safety Judgement: Good awareness of safety precautions  Problem Solving: Able to problem solve independently  Insights: Decreased awareness of deficits  Initiation: Requires cues for some  Sequencing: Requires cues for some  Cognition Comment: Comprehension:  Mod I, Expression: Independent, Social interaction: Independent, Problem Solving: Min A, Memory- Supervision  Perception  Overall Perceptual Status: WFL     Sensation  Overall Sensation Status: WFL        LUE AROM (degrees)  LUE AROM : WFL  Left Hand AROM (degrees)  Left Hand AROM: WFL  RUE AROM (degrees)  RUE AROM : WFL  Right Hand AROM (degrees)  Right Hand AROM: WFL     LUE Strength  Gross LUE Strength: Exceptions to [facility]  L Shoulder Flex: 3+/5  L Shoulder Ext: 3+/5  L Shoulder ABduction: 3+/5  L Shoulder ADduction: 3+/5  L Shoulder Horiz ABduction: 3+/5  L Shoulder Horiz ADduction: 3+/5  L Elbow Flex: 3+/5  L Elbow Ext: 3+/5  L Wrist Flex: 3+/5  L Wrist Ext: 3+/5  L Hand General: 4-/5  LUE Strength Comment: Strength limited by fatigue    RUE Strength  Gross RUE Strength: Exceptions to [facility]  R Shoulder Flex: 3+/5  R Shoulder Ext: 3+/5  R Shoulder ABduction: 3+/5  R Shoulder ADduction: 3+/5  R Shoulder Horiz ABduction: 3+/5  R Shoulder Horiz ADduction: 3+/5  R Elbow Flex: 3+/5  R Elbow Ext: 3+/5  R Wrist Flex: 3+/5  R Wrist Ext: 3+/5  R Hand General: 4-/5     Hand Dominance  Hand Dominance: Right     Plan   Plan  Times per week: 5-7x  Plan weeks: 1.5 weeks  Current Treatment Recommendations: Strengthening, Functional Mobility Training, Patient/Caregiver Education & Training, Home Management Training, Cognitive/Perceptual Training, Pain Management, Balance Training, Neuromuscular Re-education, Self-Care / ADL, Safety Education & Training, Endurance Training    G-Code     OutComes Score  AM-PAC Score    Goals  Patient Goals   Patient goals : \"I want to get back to independence\"     - Patient will complete self care as followed using the recommended adaptive equipment and/or adaptive techniques as instructed:  Feeding: Independent  Grooming: Independent  Bathing: Independent  UE Dressing: Independent  LE Dressing: Independent  Toileting: Independent  Toilet Transfer: Independent  Shower/Tub Transfer: Independent  - Patient will sequence self-care routine with G safety and no verbal/tactile cues  - Patient will improve static and dynamic standing balance to complete pants management at Independent level  - Patient will improve functional endurance to tolerate/complete 60 minutes of ADLs. - Patient will improve B UE strength and endurance to 4/5 in order to participate in self-care activities as projected. - Patient will improve B hand fine motor coordination to [facility] in order to manage clothing fasteners/self-care containers in a timely manner  - Patient will perform kitchen mobility at device level without episodes of LOB and good safety awareness   - Patient will perform basic room mobility at mod I, appropriate device level. - Patient will access appropriate D/C site with as few architectural barriers as possible. - Patient and/or caregiver will demonstrate understanding of energy conservation techniques with no verbal/tactile cues.    - Patient and/or caregiver will demonstrate understanding of recommended HEP for UE strengthening.   - Patient's cognition will improve or maintain baseline to safely perform ADLs:  Comprehension: Independent  Expression: Independent  Social Interaction: Independent  Problem Solving: Independent  Memory: Independent    Therapy Time   Individual Concurrent Group Co-treatment   Time In 1100         Time Out 1207         Minutes 67              Eval: 67 minutes    Electronically signed by LYSSA Orozco/MARCIA on 7/2/2020 at 2:36 PM  Brooklynn Collins, OTR/L

## 2020-07-02 NOTE — PLAN OF CARE
Problem: Falls - Risk of:  Goal: Will remain free from falls  Description: Will remain free from falls  7/2/2020 0132 by Anthony Shi RN  Outcome: Ongoing  7/1/2020 2124 by Ion Augustin RN  Outcome: Ongoing  Goal: Absence of physical injury  Description: Absence of physical injury  7/2/2020 0132 by Anthony Shi RN  Outcome: Ongoing  7/1/2020 2124 by Ion Augustin RN  Outcome: Ongoing     Problem: Skin Integrity:  Goal: Will show no infection signs and symptoms  Description: Will show no infection signs and symptoms  7/2/2020 0132 by Anthony Shi RN  Outcome: Ongoing  7/1/2020 2124 by Ion Augustin RN  Outcome: Ongoing  Goal: Absence of new skin breakdown  Description: Absence of new skin breakdown  7/2/2020 0132 by Anthony Shi RN  Outcome: Ongoing  7/1/2020 2124 by Ion Augustin RN  Outcome: Ongoing     Problem: Mobility - Impaired:  Goal: Mobility will improve  Description: Mobility will improve  Outcome: Ongoing

## 2020-07-02 NOTE — PROGRESS NOTES
Physical Therapy Rehab Treatment Note  [facility]       NAME: Zeinab Fortune  : 1940 ([de-identified] y.o.)  MRN: [id number]  CODE STATUS: Full Code    Date of Service: 2020  Chart Reviewed: Yes  Family / Caregiver Present: No    Restrictions:  Restrictions/Precautions: Fall Risk       SUBJECTIVE:    Pain Screening  Patient Currently in Pain: No  Pre Treatment Pain Screening  Pain at present: 1  Scale Used: Numeric Score  Intervention List: Patient able to continue with treatment    Post Treatment Pain Screenin/10  Pain Assessment  Pain Assessment: 0-10  Pain Level: 1  Pain Type: Acute pain  Pain Location: Back  Pain Orientation: Right; Lower  Pain Descriptors: Constant; Discomfort  Pain Frequency: Continuous    OBJECTIVE:   Overall Orientation Status: Within Functional Limits  Follows Commands: Within Functional Limits             Outcomes Measures:  Timed Up and Go: 24              Transfers  Sit to Stand: Contact guard assistance  Stand to sit: Contact guard assistance    Ambulation  Ambulation?: Yes  Ambulation 1  Surface: carpet  Device: Single point cane;Rollator(1st- rollator, 2nd- SPC)  Assistance: Stand by assistance;Contact guard assistance  Quality of Gait: Pt impulsive, festinating gait, and requires verbal cueing to slow down and not rush. Distance: 25ft x 3  Comments: Pt needed VC for proper turn to sit for safety and technique with poor carryover. Pt has poor awareness of correct limitations. Pt required min/modA to correct for LOB when turning to find chair. Activity Tolerance  Activity Tolerance: Patient Tolerated treatment well    Exercises  Hip Flexion: x15 seated march  Knee Long Arc Quad: x20  Ankle Pumps: x20      ASSESSMENT/COMMENTS:  Body structures, Functions, Activity limitations: Decreased functional mobility ; Decreased strength;Decreased endurance;Decreased balance;Decreased coordination;Decreased safe awareness; Increased pain  Assessment: Pt demonstrated lack of awareness for safety when walking around objests and required VC for proper sitting safety. Pt also required VC for proper standing technique. Pt needed vrbal cues to slow chilo for better control and to increase walking safety. Patient Education: Educated pt on proper technique and safety when standing, sitting, and turnign when gait training. PLAN OF CARE/Safety:   Plan Comment: Cont PT POC  Safety Devices  Type of devices: All fall risk precautions in place      Therapy Time:   Individual   Time In 1300   Time Out 1400   Minutes 60     Minutes:60      Gait trainin     Therapeutic ex: 25      Camor[place] 20 at 4:46 PM    This therapy session was supervised by Anshul Rose PTA.

## 2020-07-02 NOTE — H&P
HISTORY & PHYSICAL       DATE OF ADMISSION:  7/1/2020    DATE OF SERVICE:  7/2/20    Subjective:    Anuel Nazario, [de-identified] y.o. male presents today with:     CHIEF COMPLAINT:   [de-identified]year old male who was admitted through the ER with a history of a syncopal episode with loss of consciousness. In the ER his blood pressure was noted to be 84/56 with the highest systolic blood pressure to be 113. With the fall he suffered blunt trauma to the chest and trauma to the abdominal wall. Work-up has included CXR 06/29/20 shows NAD. EKG 06/29/20 with normal sinus rhythm. Normal EKG. CT Abdomen 06/29/20 with soft tissue contusion involving right posterolateral abdominal wall. 10 x 5 x 10 cm right posterior abdominal wall hematoma with L2 chip fracture. No abdominal organ traumatic injury or abdominal organ laceration. No intra or retroperitoneal hemorrhage or hematoma. Degenerative bone spurs in the lumbar spine and no lumbar fracture. No pelvic fracture. CT Chest 06/29/20 with no acute traumatic injury to chest.  Mediastinal structures and thoracic aorta intact. Lungs appear expanded and clear. Degenerative bone spurring in the thoracic spine and no fracture. Ribs intact and no rib fractures. CT Head 06/29/20 with no evidence of acute intracranial abnormality. Moderate to severe cerebral volume loss/atrophy with white matter changes consistent with sequelae small vessel ischemic disease. Empty sella. Imaging did reveal a L2 chip fracture. Lumbar XR 06/29/20 with possible chip fracture versus osteophyte anterior corner of L2. Moderate facet osteoarthropathy L3-S1 with moderate degenerative disc disease L5-S1. Echo 06/30/20 with 55% ejection fraction. Neurology saw him and he felt the fall was related to his orthostasis from his Parkinson's medication. He was recently diagnosed at the [facility] with Parkinson's and started on the Sinemet. Dr. Shasta Apodaca started him on ProAmatine.   Because of low H/H he will receive 1 unit PRB. The patient has been found to have severe abnormality of gait and mobility with impaired self care due to Severe Parkinson's Disease with Blunt Chest Trauma and is admitted to the acute inpatient rehab program.     Transcribed from pre-admission information sheet completed by Florian Julian RN/mdl as directed by Dr. Kathleen Magana. Neurologic Problem   The patient's primary symptoms include weakness. Associated symptoms include back pain, dizziness, fatigue and shortness of breath. Pertinent negatives include no abdominal pain, chest pain, diaphoresis, fever, headaches, nausea, neck pain, palpitations or vomiting. Fatigue   Associated symptoms include fatigue, myalgias, numbness and weakness. Pertinent negatives include no abdominal pain, chest pain, chills, congestion, coughing, diaphoresis, fever, headaches, nausea, neck pain, rash, sore throat or vomiting. Back Pain   Associated symptoms include numbness and weakness. Pertinent negatives include no abdominal pain, chest pain, dysuria, fever or headaches. The patient has stabilized medically andis able to participate at acute level rehab but is too medically complex for SNF due to need for therapy at the acute level with at least 15 hours a week of PT OT and cognitive and recreational therapy at an acute level with daily medical monitoring. Imaging:    Imaging and other studies reviewed and discussed with patient and staff    Xr Lumbar Spine (2-3 Views)    Result Date: 2020  Patient MRN: [id number] : 1940 Age:  [de-identified] years Gender: Male Order Date: 2020 3:30 PM. Exam: XR LUMBAR SPINE (2-3 VIEWS) Number of Views: 3 Indication:  Fall today with right low back pain radiating into the right hip. L2 chip. Comparison: CT abdomen and pelvis 2020. Findings: Nonspecific bowel gas pattern. Vascular calcifications noted abdominal aorta. Normal sagittal alignment lumbar spine.  Possible fracture versus osteophyte anterior superior corner of L2. Moderate facet osteoarthropathy L3-S1. Moderate degenerative disc disease L5-S1. No Pars interarticularis fracture defect. Impression:  1. Possible chip fracture versus osteophyte anterior superior corner of L2, further evaluation with MRI of the lumbar spine recommended. 2. Moderate facet osteoarthropathy L3-S1 with moderate degenerative disc disease L5-S1. 3. Vascular calcifications abdominal aorta. Ct Head Wo Contrast    Result Date: 2020  Patient MRN: [id number] : 1940 Age:  [de-identified] years Order Date: 2020 11:00 AM. Gender: Male Exam: CT HEAD WO CONTRAST Number of Images: 183 Indication: Syncope, collapse that began 90 minutes ago. Contrast dosage: None Comparison: 2018 at CT Findings: This examination was performed on a CT scanner with dose reduction. Technique: Low-dose CT  acquisition technique included one of following options; 1 . Automated exposure control, 2. Adjustment of MA and or KV according to patient's size or 3. Use of iterative reconstruction. Vascular calcifications within the bilateral internal carotid artery cavernous segments and the vertebral arteries. . Moderate to moderately severe cerebral volume loss/atrophy. No subfalcine, transtentorial or tonsillar herniation or shift. No intraparenchymal hemorrhage. No extra-axial fluid collection or hematoma. Hypodensities bilateral centrum semiovale and periventricular regions. Empty sella. No Chiari malformation. Mucosal disease left maxillary sinus. No acute fracture or lytic or blastic bony lesion. Postoperative changes left orbit/optic globe. Airways and paranasal sinuses grossly unremarkable. 1. No CT evidence of acute intracranial abnormality, as questioned. 2. Moderate to moderately severe cerebral volume loss/atrophy with white matter changes consistent with sequelae small vessel ischemic disease. . 3. Vascular calcifications within the bilateral internal carotid artery cavernous segments and the vertebral arteries. . 4. Empty sella. Ct Chest W Contrast    Result Date: 6/29/2020  EXAMINATION:  CT CHEST WITH IV CONTRAST CLINICAL HISTORY:  PATIENT FELL WITH BLUNT CHEST TRAUMA, RIGHT SIDE CHEST PAIN COMPARISON:  NONE AVAILABLE TECHNIQUE:  CT chest obtained following IV injection 100 mL of Isovue-300. Axial and MPR CT images of the chest obtained. CT images are viewed with pulmonary, mediastinal and bone window settings. FINDINGS:  There is no thoracic aorta aneurysm or dissection. Pulmonary arteries appear normal and there is no pulmonary thromboembolic disease. The heart is not enlarged and there is no pericardial effusion. There are coronary artery calcifications. There is no mediastinal mass or adenopathy. There is no mediastinal hematoma. Lungs appear expanded and clear. No pulmonary contusion or pneumothorax. No pulmonary infiltrate or pleural effusion. There is bilateral posterior pleural thickening. CT scans with bone window settings demonstrate degenerative bone spurring throughout the thoracic spine and no thoracic spine fracture. Sternum appears intact. There are no displaced rib fractures. Remaining visualized bony thorax unremarkable. 1. NO ACUTE TRAUMATIC INJURY TO THE CHEST AS DESCRIBED IN BODY OF REPORT. 2. MEDIASTINAL STRUCTURES AND THORACIC AORTA INTACT. NO MEDIASTINAL HEMATOMA. 3. LUNGS APPEAR EXPANDED AND CLEAR WITHOUT A PULMONARY CONTUSION OR PNEUMOTHORAX. 4. DEGENERATIVE BONE SPURRING IN THE THORACIC SPINE AND NO THORACIC SPINE FRACTURE. 5. RIBS APPEAR INTACT AND NO CT EVIDENCE OF A DISPLACED RIB FRACTURE. All CT scans at this facility use dose modulation, iterative reconstruction, and/or weight based dosing when appropriate to reduce radiation dose to as low as reasonably achievable.     Ct Abdomen Pelvis W Iv Contrast Additional Contrast? None    Result Date: 6/29/2020  EXAMINATION:  CT ABDOMEN/PELVIS WITH IV CONTRAST CLINICAL HISTORY:  BLUNT ABDOMINAL TRAUMA, PATIENT STRUCK RIGHT SIDE OF ABDOMEN, RIGHT ABDOMINAL PAIN, EVALUATE FOR TRAUMATIC INJURY TO THE ABDOMEN COMPARISON:  NONE AVAILABLE. TECHNIQUE:  CT abdomen and pelvis obtained following IV injection 100 mL of Isovue-300. FINDINGS:  There is subcutaneous \"fat stranding\" involving the right posteriolateral abdominal wall compatible with an abdominal wall contusion. In addition, there is an approximately 10 x 5 x 10 cm right posterior abdominal wall hematoma with concomitant chip fracture from the right transverse process of the L2 vertebra. There is no intraperitoneal or retroperitoneal hematoma. There is no \"free fluid\", abscess or pneumoperitoneum in abdomen. There is hepatic steatosis and several hepatic cysts which were reported on a 6/4/2015 abdominal ultrasound scan. No evidence of a liver laceration. An ultrasound scan may be obtained to further evaluate the hepatic cysts if clinically warranted. The spleen, pancreas and adrenal glands appear normal. There is bilateral renal cortical atrophy and bilateral renal cysts. There is a 2 cm exophytic round mass in the interpolar region of the left kidney which may very well represent a so-called \"hyperdense  cyst\" however, a solid left renal mass not totally excluded. There is no nephrolithiasis or hydronephrosis. There is no renal laceration or fracture. There is atherosclerotic calcification of the abdominal aorta without an aneurysm. No retroperitoneal adenopathy. There is a slightly distended stomach with an air/fluid level. Nondistended small bowel unremarkable and no SBO. Nondistended colon unremarkable. There is no CT evidence of bowel perforation. CT scan of pelvis demonstrates a relatively normal size prostate gland and seminal vesicles. Urinary bladder appears normal. No pelvic mass, adenopathy or \"free fluid\" in the pelvis. Also, there is no pelvic hematoma.  CT scans with bone window settings demonstrate degenerative and arthritic changes in the lumbar spine. Pelvis appears intact and there is no pelvic fracture. Visualized portions of the hips appear unremarkable. 1. SOFT TISSUE CONTUSION INVOLVING THE RIGHT POSTEROLATERAL ABDOMINAL WALL. 2. 10 X 5 X 10 CM RIGHT POSTERIOR ABDOMINAL WALL HEMATOMA WITH L2 CHIP FRACTURE. 3. NO INTRAPERITONEAL OR RETROPERITONEAL HEMORRHAGE OR HEMATOMA. 4. NO ABDOMINAL ORGAN TRAUMATIC INJURY OR ABDOMINAL ORGAN LACERATION. 5. DEGENERATIVE BONE CHANGES IN THE LUMBAR SPINE AND NO LUMBAR SPINE FRACTURE. 6. NO PELVIC FRACTURE AND ADDITIONAL INCIDENTAL FINDINGS IN BODY OF REPORT. All CT scans at this facility use dose modulation, iterative reconstruction, and/or weight based dosing when appropriate to reduce radiation dose to as low as reasonably achievable. Xr Chest Portable    Result Date: 2020  XR CHEST PORTABLE Exam Date/Time:  2020 11:00 AM Clinical History:   syncope   R55 Syncope and collapse ICD10. Comparison:  10/15/2019  RESULT: Lungs and pleura:  No consolidation. No pleural effusion. No pneumothorax. Normal pulmonary vascular pattern. Cardiomediastinal silhouette:  Stable cardiomediastinal silhouette. Other:  No acute osseous findings. Remote fracture deformity left proximal humerus. No acute radiographic abnormality. Us Carotid Artery Bilateral    Result Date: 2020   Patient MRN:  [id number] : 1940 Age: [de-identified] years Gender: Male Order Date:  2020 2:00 PM EXAM: US CAROTID ARTERY BILATERAL NUMBER OF IMAGES:  54 INDICATION:  syncope, vertigo  COMPARISON: Carotid artery ultrasound 2017 FINDINGS: Right side: Proximal common carotid artery peak systolic velocity is 258 centimeters per second Mid, common carotid artery peak systolic velocity is 159 centimeters per second. Distal common carotid artery peak systolic velocity is 95.7 centimeters per second External carotid artery peak systolic velocity is 30.3 centimeters per second.  Proximal internal carotid artery peak systolic velocity is 44.5 centimeters per second Mid internal carotid artery peak systolic velocity is 98.1 centimeters per second. Distal internal carotid artery peak systolic velocity is 64.6 centimeters per second Vertebral artery peak systolic velocity is 24.6 centimeters per second. Vertebral artery flow is antegrade ICA/CCA ratio is 0.85 Left side: Proximal common carotid artery peak systolic velocity is 271 centimeters per second Mid, common carotid artery peak systolic velocity is 150 centimeters per second. Distal common carotid artery peak systolic velocity is 43.6 centimeters per second. External carotid artery peak systolic velocity is 83.4 centimeters per second. Proximal internal carotid artery peak systolic velocity is 01.3 centimeters per second Mid internal carotid artery peak systolic velocity is 25.0 centimeters per second. Distal internal carotid artery peak systolic velocity is 90.1 centimeters per second Vertebral artery peak systolic velocity is 00.8 centimeters per second. Vertebral artery flow is antegrade ICA/CCA ratio is 0.78. Scattered areas of calcified and noncalcified plaque bilateral internal carotid arteries and bilateral carotid bulb/bifurcations. Peak systolic velocities, ICA/CCA ratios and antegrade vertebral artery flow as above. See above for details of the examination and below for internal carotid artery interpretation guidelines. 1. No areas of estimated luminal stenosis greater than 0-49 percent within the bilateral internal carotid arteries. . 2. Scattered areas of calcified and noncalcified plaque bilateral internal carotid arteries and bilateral carotid artery bulb/bifurcations.  GOSINK CRITERIA Diameter          PSV t            EDV t          PSV          EDV          Stenosis Site       %              cm/sec          cm/sec      ICA/CCA    ICA/CCA 0-49                 <124              <40             <2:1           ---                 --- 50-69              754-526        >2:1           ---                 --- 70-89               225-325          >100          >4:1           >5:1              --- 90%+                >325              >100          >4:1           >9:1           Damped resistive CCA >95%               May be            May be      Damped      ---              Damped resistive CCA                       decreased      decreased  resistive CCA Validated velocity measurements with angiographic measurements, velocity criteria are extrapolated from diameter data as defined by the Society of Radiologist in [address] [place] [address] Drive Radiology 2003; 655;106-911. Labs:     labs reviewed and discussed with patient and staff    Lab Results   Component Value Date    POCGLU 103 07/14/2017     Lab Results   Component Value Date     07/01/2020    K 3.6 07/01/2020     07/01/2020    CO2 20 07/01/2020    BUN 15 07/01/2020    CREATININE 0.60 07/01/2020    CALCIUM 7.4 07/01/2020    LABALBU 3.7 06/29/2020    LABALBU 4.5 12/01/2011    BILITOT 0.5 06/29/2020    ALKPHOS 37 06/29/2020    AST 20 06/29/2020    ALT 9 06/29/2020     Lab Results   Component Value Date    WBC 4.9 07/01/2020    RBC 2.02 07/01/2020    RBC 3.79 12/01/2011    HGB 7.9 07/02/2020    HCT 23.7 07/02/2020    .8 07/01/2020    MCH 36.0 07/01/2020    MCHC 33.4 07/01/2020    RDW 19.6 07/01/2020    PLT 91 07/01/2020    MPV 8.1 07/06/2015     Lab Results   Component Value Date    VITD25 41.6 11/13/2019     Lab Results   Component Value Date    APPEARANCE CLEAR 12/06/2012    COLORU Yellow 06/30/2020    NITRU Negative 06/30/2020    GLUCOSEU Negative 06/30/2020    GLUCOSEU NEG 12/01/2011    KETUA 15 06/30/2020    UROBILINOGEN 1.0 06/30/2020    BILIRUBINUR Negative 06/30/2020    BILIRUBINUR NEG 12/01/2011     Lab Results   Component Value Date    PROTIME 13.0 06/30/2020     Lab Results   Component Value Date    INR 1.0 06/30/2020         I discussed results with patient.       The bilateral; dry on left and wet on right-Dr. Maylin Merrill Mass on back 5/14/2015    Osteoarthritis cervical spine     Parkinson disease ([facility] [address]     Dr. Shaheen Oliveira       Past Surgical History:   Procedure Laterality Date    BACK SURGERY      CARDIAC CATHETERIZATION  11/09/2017    [facility] MD    CATARACT REMOVAL      COLONOSCOPY  11/2009    single polyp right colon    EYE SURGERY Bilateral     OTHER SURGICAL HISTORY  06/08/15    EXC SUBCU MASS BACK    TONSILLECTOMY         Current Facility-Administered Medications   Medication Dose Route Frequency Provider Last Rate Last Dose    lidocaine 4 % external patch 3 patch  3 patch Transdermal Daily Nohemy Scullin, DO   3 patch at 07/02/20 1219    analgesic ointment ointment   Topical TID Nohemy Scullin, DO        HYDROcodone-acetaminophen (NORCO) 5-325 MG per tablet 1 tablet  1 tablet Oral Q4H PRN Nohemy Scullin, DO        Vitamin D (CHOLECALCIFEROL) tablet 2,000 Units  2,000 Units Oral Dinner Nohemy Scullin, DO   2,000 Units at 07/02/20 1634    cyanocobalamin injection 1,000 mcg  1,000 mcg Intramuscular Weekly Nohemy Scullin, DO   1,000 mcg at 07/02/20 0815    coenzyme Q10 capsule 100 mg  100 mg Oral BID AC Nohemy Scullin, DO   100 mg at 07/02/20 1634    bisacodyl (DULCOLAX) EC tablet 5 mg  5 mg Oral Daily PRN Nohemy Scullin, DO        ferrous sulfate (IRON 325) tablet 325 mg  325 mg Oral BID WC Nohemy Scullin, DO   325 mg at 07/02/20 1634    fleet rectal enema 1 enema  1 enema Rectal Daily PRN Nohemy Scullin, DO        acetaminophen (TYLENOL) tablet 650 mg  650 mg Oral Q6H PRN Donald Delfino, DO        Or    acetaminophen (TYLENOL) suppository 650 mg  650 mg Rectal Q6H PRN Donald Delfino, DO        polyethylene glycol (GLYCOLAX) packet 17 g  17 g Oral Daily PRN Donald Delfino, DO        promethazine (PHENERGAN) tablet 12.5 mg  12.5 mg Oral Q6H PRN Donald Delfino, DO        Or    ondansetron (ZOFRAN) injection 4 mg  4 mg Intravenous Q6H PRN Glenroy Janus, DO        sodium chloride flush 0.9 % injection 10 mL  10 mL Intravenous 2 times per day Glenroy Janus, DO   10 mL at 07/02/20 0905    sodium chloride flush 0.9 % injection 10 mL  10 mL Intravenous PRN Glenroy Janus, DO        brimonidine (ALPHAGAN) 0.2 % ophthalmic solution 1 drop  1 drop Both Eyes BID Glenroy Janus, DO   1 drop at 07/02/20 7614    carbidopa-levodopa (SINEMET)  MG per tablet 1 tablet  1 tablet Oral TID Glenroy Janus, DO   1 tablet at 79/15/60 4657    folic acid (FOLVITE) tablet 1,000 mcg  1,000 mcg Oral Daily Glenroy Janus, DO   1,000 mcg at 07/02/20 6961    latanoprost (XALATAN) 0.005 % ophthalmic solution 1 drop  1 drop Both Eyes Nightly Glenroy Janus, DO   1 drop at 07/01/20 2136    midodrine (PROAMATINE) tablet 5 mg  5 mg Oral TID WC Glenroy Janus, DO   5 mg at 07/02/20 1634    vitamin B-12 (CYANOCOBALAMIN) tablet 1,000 mcg  1,000 mcg Oral Daily Glenroy Janus, DO   1,000 mcg at 07/02/20 0284    nystatin (MYCOSTATIN) 065939 UNIT/ML suspension 500,000 Units  5 mL Oral TID Nohemy Scullin, DO   500,000 Units at 07/02/20 1301       Allergies   Allergen Reactions    Cat Hair Extract Itching and Swelling     cats       Social History     Socioeconomic History    Marital status:      Spouse name: Not on file    Number of children: 3    Years of education: Not on file    Highest education level: Not on file   Occupational History    Occupation: retired     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   Social Needs    Financial resource strain: Not hard at all   [employer] insecurity     Worry: Never true     Inability: Never true    Transportation needs     Medical: No     Non-medical: No   Tobacco Use    Smoking status: Never Smoker    Smokeless tobacco: Never Used   Substance and Sexual Activity    Alcohol use: Yes     Comment: rare    Drug use: No    Sexual activity: Not Currently     Partners: Female   Lifestyle    Physical activity     Days per week: 3 days     Minutes per session: 20 min    Stress: Only a little   Relationships    Social connections     Talks on phone: More than three times a week     Gets together: Once a week     Attends Faith service: 1 to 4 times per year     Active member of club or organization: No     Attends meetings of clubs or organizations: Never     Relationship status:     Intimate partner violence     Fear of current or ex partner: No     Emotionally abused: No     Physically abused: No     Forced sexual activity: No   Other Topics Concern    Not on file   Social History Narrative    Lives with wife. She is in good health and she still drives. He is retired. He has 3 children all of which live out of the area one in [place] [address] in [address] [place] [address] Drive and one in the [facility]. 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 Pt reports high level of indep prior to admission. FAMILY HISTORY:  Does not pertain tochief complaint. Review of Systems   Constitutional: Positive for activity change and fatigue. Negative for chills, diaphoresis and fever. HENT: Negative for congestion, ear discharge, ear pain, hearing loss, nosebleeds, sore throat and tinnitus. Eyes: Negative for photophobia, pain and redness.    Respiratory: Positive for shortness of breath. Negative for cough, wheezing and stridor. Shortness of breath on exertion   Cardiovascular: Negative for chest pain, palpitations and leg swelling. Gastrointestinal: Positive for constipation. Negative for abdominal pain, blood in stool, diarrhea, nausea and vomiting. Endocrine: Positive for cold intolerance. Negative for polydipsia. Genitourinary: Negative for dysuria, flank pain, frequency, hematuria and urgency. Musculoskeletal: Positive for back pain, gait problem and myalgias. Negative for neck pain. Skin: Positive for wound. Negative for rash. Allergic/Immunologic: Positive for immunocompromised state. Negative for environmental allergies. Neurological: Positive for dizziness, weakness and numbness. Negative for tremors, seizures and headaches. Hematological: Bruises/bleeds easily. Psychiatric/Behavioral: Positive for dysphoric mood. Negative for hallucinations and suicidal ideas. The patient is not nervous/anxious. Objective  /78   Pulse 68   Temp 98 °F (36.7 °C) (Oral)   Resp 17   Ht 6' (1.829 m)   Wt 165 lb (74.8 kg)   SpO2 98%   BMI 22.38 kg/m² *    Physical Exam  Vitals signs reviewed. Constitutional:       General: He is not in acute distress. Appearance: He is well-developed. He is not ill-appearing, toxic-appearing or diaphoretic. Comments:         HENT:      Head: Normocephalic and atraumatic. Right Ear: Hearing normal.      Left Ear: Hearing normal.      Nose: Nose normal.      Mouth/Throat:      Mouth: No oral lesions. Dentition: Normal dentition. Pharynx: No oropharyngeal exudate. Eyes:      General: No scleral icterus. Right eye: No discharge. Left eye: No discharge. Conjunctiva/sclera: Conjunctivae normal.      Right eye: No chemosis or exudate. Left eye: No chemosis or exudate. Pupils: Pupils are equal, round, and reactive to light.    Neck:      Musculoskeletal: Normal range of motion and neck supple. No edema or neck rigidity. Thyroid: No thyromegaly. Vascular: No JVD. Trachea: No tracheal deviation. Cardiovascular:      Pulses: No decreased pulses. Pulmonary:      Effort: Pulmonary effort is normal. No tachypnea, bradypnea, accessory muscle usage or respiratory distress. Breath sounds: Decreased breath sounds present. No wheezing or rales. Chest:      Chest wall: No tenderness. Abdominal:      General: Bowel sounds are normal. There is no distension. Palpations: Abdomen is soft. There is no mass. Tenderness: There is no abdominal tenderness. There is no guarding or rebound. Musculoskeletal:         General: Tenderness present. Right shoulder: Normal.      Left shoulder: Normal.      Right elbow: Normal.     Left elbow: Normal.      Right wrist: Normal.      Left wrist: Normal.      Right hip: Normal.      Left hip: Normal.      Right knee: Normal.      Left knee: Normal.      Right ankle: Normal. Achilles tendon normal.      Left ankle: Normal. Achilles tendon normal.      Cervical back: Normal.      Thoracic back: Normal.      Lumbar back: He exhibits decreased range of motion, tenderness, bony tenderness and pain. He exhibits no swelling, no edema, no deformity, no laceration and normal pulse. Right upper arm: Normal.      Left upper arm: Normal.      Right forearm: Normal.      Left forearm: Normal.      Right hand: Normal.      Left hand: Normal.      Right upper leg: Normal.      Left upper leg: Normal.      Right lower leg: Normal.      Left lower leg: Normal.      Right foot: Normal.      Left foot: Normal.      Comments: Tender areas are indicated by numbered spot         Lymphadenopathy:      Cervical: No cervical adenopathy. Skin:     General: Skin is warm and dry. Coloration: Skin is not pale. Findings: No abrasion, bruising, ecchymosis, erythema, laceration, petechiae or rash.  Rash is not macular, pustular or urticarial.      Nails: There is no clubbing. Neurological:      Mental Status: He is alert and oriented to person, place, and time. Cranial Nerves: No cranial nerve deficit. Sensory: Sensory deficit present. Motor: No tremor, atrophy or abnormal muscle tone. Coordination: Coordination normal. Finger-Nose-Finger Test abnormal and Romberg Test abnormal.      Gait: Gait abnormal.      Deep Tendon Reflexes: Reflexes abnormal. Babinski sign absent on the right side. Babinski sign absent on the left side. Reflex Scores:       Tricep reflexes are 1+ on the right side and 1+ on the left side. Bicep reflexes are 1+ on the right side and 1+ on the left side. Brachioradialis reflexes are 1+ on the right side and 1+ on the left side. Patellar reflexes are 1+ on the right side and 1+ on the left side. Achilles reflexes are 0 on the right side and 0 on the left side. Psychiatric:         Attention and Perception: He is attentive. Mood and Affect: Mood is not anxious or depressed. Affect is not labile, blunt, angry or inappropriate. Speech: Speech normal. He is communicative. Speech is not rapid and pressured, delayed, slurred or tangential.         Behavior: Behavior normal. Behavior is not agitated, slowed, aggressive, withdrawn, hyperactive or combative. Thought Content: Thought content normal. Thought content is not paranoid or delusional. Thought content does not include homicidal or suicidal ideation. Thought content does not include homicidal or suicidal plan. Cognition and Memory: Cognition is impaired. Memory is not impaired. He does not exhibit impaired recent memory or impaired remote memory. Judgment: Judgment normal. Judgment is not impulsive or inappropriate.       Comments: Poor judgment reasoning and insight       Back Exam     Muscle Strength   Right Quadriceps:  4/5   Left Quadriceps:  4/5   Right Hamstrings:  4/5 Left Hamstrings:  4/5           Neurologic Exam     Mental Status   Oriented to person, place, and time. Follows 2 step commands. Attention: decreased. Concentration: decreased. Speech: speech is normal and not slurred   Level of consciousness: alert  Knowledge: good. Able to name object. Able to read. Able to repeat. Able to write. Normal comprehension. Cranial Nerves     CN III, IV, VI   Pupils are equal, round, and reactive to light.     Motor Exam   Muscle bulk: decreased  Right arm tone: normal  Left arm tone: normal    Strength   Right neck flexion: 4/5  Left neck flexion: 4/5  Right neck extension: 4/5  Left neck extension: 4/5  Right deltoid: 4/5  Left deltoid: 4/5  Right biceps: 4/5  Left biceps: 4/5  Right triceps: 4/5  Left triceps: 4/5  Right wrist flexion: 4/5  Left wrist flexion: 4/5  Right wrist extension: 4/5  Left wrist extension: 4/5  Right interossei: 4/5  Left interossei: 4/5  Right abdominals: 4/5  Left abdominals: 4/5  Right iliopsoas: 4/5  Left iliopsoas: 4/5  Right quadriceps: 4/5  Left quadriceps: 4/5  Right hamstrin/5  Left hamstrin/5  Right glutei: 4/5  Left glutei: 4/5  Right anterior tibial: 4/5  Left anterior tibial: 4/5  Right posterior tibial: 4/5  Left posterior tibial: 4/5  Right peroneal: 4/5  Left peroneal: 4/5  Right gastroc: 4/5  Left gastroc: 4/5    Sensory Exam   Right arm light touch: decreased from fingers  Left arm light touch: decreased from fingers  Right leg light touch: decreased from knee  Left leg light touch: decreased from knee    Gait, Coordination, and Reflexes     Gait  Gait: wide-based    Coordination   Romberg: positive  Finger to nose coordination: abnormal    Reflexes   Right brachioradialis: 1+  Left brachioradialis: 1+  Right biceps: 1+  Left biceps: 1+  Right triceps: 1+  Left triceps: 1+  Right patellar: 1+  Left patellar: 1+  Right achilles: 0  Left achilles: 0  Right : 1+  Left : 1+      After extensive review of the records and above physical exam, I have formulated the followingdiagnoses and plan:      DIAGNOSES:    1. The patient was admitted to the acute rehabilitation unit with the primary rehab diagnoses being severe abnormality of gait and mobility andimpaired self care and ADL's due to progression of severe Parkinson's disease    Compared to Pre-Admission Assessment, patients medical and functional status remain challengingly complex and patient continues to requireintensive therapeutic intervention from multiple therapies, therefore, initiate acute intensive comprehensive inpatient rehabilitation program including PT/OT to improve balance, ambulation, ADLs, and to improve the P/AROM. Functional and medical status reassessed regarding patients ability to participate in therapies and patient found to be able to participate in acute intensivecomprehensive inpatient rehabilitation program.  Therapeutic modifications regarding activities in therapies, place, amount of time per day and intensity of therapy made daily. Enroll in acute course of therapy program to include 1 1/2 hours per day of PT 5 to 7days per week and 1 1/2 hours per day of OT 5 to 7 days per week, and  SLP and Rec T 1/2 hour per day 3-5 days per week. The patient is stable medically and physically on previous exam.       This patient present with significant new onset decreased mobility andinability to perform activities of daily living skills independently and is at significant risk for prolonged disability  For this reason they have been admitted to [facility]. Thepatient's current functional and medical status are highly complex but the patient is able to participate in intensive rehabilitation. A comprehensive inpatient rehabilitation program is appropriate.   The patient Jay Jay Hamper initial evaluation by the rehabilitation team and be discussed at regular treatment team meetings to assess progress, mobility, self care, mood and discharge issues. Physical therapy will be consulted for mobilityand endurance issues and should be performed 1 to 2 times per day, 7 days per week for the length of stay. Occupational therapy will be consulted for activities of daily living and should be performed 1 to 2 times per day,7 days per week for the length of stay. Their capacity to participate at an acute level, decision to be treated in the gym, room or on the unit, their activity goals for the day and the number of minutes of activeparticipation will be reassessed and re-prescribed daily. Because this patient is medically complex, I will check a CBC, BMP, UA and orthostatic blood pressures. They will be reassessed daily regarding their ability toparticipate in an acute level rehabilitation program.  Recreational Therapy will be consulted for community re-entry and adjustment to disability. Communication, cognitive and emotional issues will also be addressed duringthis rehabilitation stay by rehabilitation psychologist or speech therapist as appropriate. I reviewed the patient's old and current charts and discussed other rehabilitation options with the rehabilitation teamincluding the rehab RN and the admission team as well as the patient. I feel that the patient's functional recovery would be best served at an acute inpatient rehabilitation program because the patient needs intense therapythree hours per day, direct RN supervision and daily monitoring by a physician for medical status. This cannot be sufficiently provided by home health care, a skilled nursing facility or in an outpatient setting. I furtherfeel that the patient has the potential to improve functional abilities in an acute intensive rehabilitation program.    Old records were reviewed and summarized.     2.  Other diagnoses which complicate rehabilitation stay include:     Principal Problem:    Gait abnormality due to Severe Parkinson's Disease with Blunt Chest Trauma; rehab admission 07/01/20. Active Problems:  1. Hypertension with   Orthostasis, Aortic valve stenosis, severe, LVH (left ventricular hypertrophy), Diastolic dysfunction-vital signs every shift dose and titrate cardiac medications to include ProAmatine consult hospitalist for backup medical-recheck CBC monitor closely for bleeding  2. Macular degeneration with   Blindness  right eye-usual cues to the right  3. BPH (benign prostatic hyperplasia)-check post void residuals and check recheck UA  4.   PD (Parkinson's disease) -monitor for orthostasis treat comorbidities including pain constipation rigid rigidity and cognitive decline consult neurology and titrate dopaminergic medications  5. MDS (myelodysplastic syndrome), low grade -add B12 recheck CBC add iron with food add Folvite transfuse as needed  6. Closed fracture of body of lumbar vertebra -add Lidoderm add high-dose vitamin D  7. 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 Primary orthostatic hypotension-check Ortho BPs titrate cardiac medications         I am especially concerned about lack of awareness of his deficits and balance disorder    The patient's high risk medication use includes blood pressure medications given orthostasis. The patient is high risk for urinary tract infection, an admission urinalysis has been ordered. I will have the nurses check post void residual bladder volumes and place acatheter if excessive urine is retained in the bladder after voiding. The patient is risk for deep venous thromosis,complex deep venous thrombosis protocol prophylaxis has been ordered  . The patient is high risk for orthostasis and a hydration program and orthostatic blood pressure screening have been ordered. I will attempt to get old records from the patient's previous hospital stay. Care everywhere on [facility] wasutilized.     3.  Current

## 2020-07-02 NOTE — PROGRESS NOTES
Patient transferred to rehab following phone calls from [facility], supervisor Mago Franco that this patient needs to send to rehab. When given the room number for transfer earlier today, Trinh Louise RN was told that the patient could not transfer until after dinner.  Temi Garcia was also told this per calling to inform of this room number to transfer and to be done \"after dinner\". When asked during report I was told that it was because they received 5 admissions and wanted to space them out. This was relayed to the supervisor and manager d/t the concern that the patient has not discharged from  after receiving the room number. Report was called and informed that I did complete the one unit of PRBC. During the transfusion, IV infiltrated in R arm and a #20 was placed to the RFA. I also informed the nurse that I was unable to complete the platelet transfusion ordered d/t the orders being put in for rehab earlier in the day in discharge/readmit. I returned the platelets within 20 minutes of receiving them from Blood bank. Dr. Michelle Davis notified. Rocael Garzon and Viviana Urena notified. Rehab nurse aware that the platelet will be transfused on their unit after admitting to their floor. Tele removed. IV intact and flushed. Wife with patient at time of discharge and belongings sent.

## 2020-07-02 NOTE — PROGRESS NOTES
[facility]   Facility/Department: [facility]  Speech Language Pathology  Clinical Bedside Swallow Evaluation    NAME:Derek Edmondson  : 1940 (89 y.o.)   MRN: [id number]  ROOM: [place] [address]  ADMISSION DATE: 2020  PATIENT DIAGNOSIS(ES): Abnormality of gait and mobility [R26.9]  Abnormality of gait and mobility [R26.9]  No chief complaint on file.     Patient Active Problem List    Diagnosis Date Noted    BMI 22.0-22.9, adult 2020    Abnormality of gait and mobility 2020    Hematoma     Orthostasis 2020    Gait abnormality due to Severe Parkinson's Disease with Blunt Chest Trauma; rehab admission 20. 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    Primary orthostatic hypotension 2018    Macular degeneration     Glaucoma     BPH (benign prostatic hyperplasia)     Anemia 2014    Aortic valve stenosis, severe 2013    LVH (left ventricular hypertrophy) 711    Diastolic dysfunction     Hypertension 2011     Past Medical History:   Diagnosis Date    Actinic keratoses     Aortic valve stenosis, severe 2013    BPH (benign prostatic hyperplasia)     Cancer ([address] 2018    skin cancer on chest removed    Colon polyp     Diastolic dysfunction     Stage 2 by echo    ED (erectile dysfunction) 2011    Glaucoma     Dr. Donald Orr Hemochromatosis     History of echocardiogram 2015    mild AS, mild AR, mild TR, mild MR    [place]    Hypertension     Iron deficiency anemia 2014    LVH (left ventricular None (Room air)  Communication Observation: Functional  Follows Directions: Simple  Dentition: Adequate; Some missing teeth  Patient Positioning: Upright in chair  Baseline Vocal Quality: Weak  Consistencies Administered: Reg solid; Dysphagia Pureed (Dysphagia I); Nectar - cup; Thin - cup    Current Diet level:  Current Diet : Regular  Current Liquid Diet : Thin    Oral Motor Deficits  Oral/Motor  Oral Motor: Exceptions to WFL(Pt demonstrated lingual tremors at rest.)  Labial Strength: Reduced  Lingual Strength: Reduced    Oral Phase Dysfunction  Oral Phase  Oral Phase: WFL  Oral Phase Dysfunction  Lingual/Palatal Residue: Reg solid  Oral Phase  Oral Phase - Comment: Oral phase of swallow WFL. Pt was able to form and clear a cohesive bolus with min lingual residue. Liquid was was not needed to clear residue. Indicators of Pharyngeal Phase Dysfunction   Pharyngeal Phase  Pharyngeal Phase: WFL  Indicators of Pharyngeal Phase Dysfunction  Throat Clearing - Immediate: Nectar - cup  Pharyngeal Phase   Pharyngeal: Suspected pharyngeal phase dysphagia characterized by immediate throat clear following trial of nectar thick liquids from cup x1. it should be noted that pt presented with throat clear prior to trials this date. Pt did not display overt s/s of aspiration following 3 additional trials of nectar thick liquids from cup, and no overt s/s over aspiration following 3 sips of thin liquids from cup. Pt politely refused consecutive sips this date. One time follow up with meal is recommended in order to ensure pt's tolerance with recommended diet consistency. Impression  Dysphagia Diagnosis: Suspected needs further assessment  Dysphagia Impression : Suspected pharyngeal phase dysphagia. Dysphagia Outcome Severity Scale: Level 5: Mild dysphagia- Distant supervision.  May need one diet consistency restricted     Treatment Plan  Requires SLP Intervention: Yes  Duration/Frequency of Treatment: 1x f/u mm  D/C Recommendations: To be determined       Treatment/Goals  Short-term Goals  Timeframe for Short-term Goals: 2-3 weeks  Goal 1: Pt will tolerate 5/5 trials of regular consistencies and thin liquids with no overt s/s of aspiration observed. Long-term Goals  Timeframe for Long-term Goals: 2-3 weeks  Goal 1: Pt will tolerate least restrictive diet consistency with no adverse outcomes. Prognosis  Individuals consulted  Consulted and agree with results and recommendations: Patient; Family member  Family member consulted: Pt Wife    Education  Patient Education: Pt was educated on results of BSE. Patient Education Response: Verbalizes understanding  Safety Devices in place: Yes  Type of devices: Call light within reach; All fall risk precautions in place; Left in chair;Chair alarm in place    Pain:  Pain Assessment  Patient Currently in Pain: No  Pain Assessment: 0-10  Pain Level: 0  Pain Location: Back  Pain Orientation: Right, Lower  Pain Descriptors: Discomfort  Pain Frequency: (Pt reports he is not currently in pain, however, he experiences pain when he moves.)     [facility]    SWALLOWING:  Ratin    Therapy Time  SLP Individual Minutes  Time In: 2108  Time Out: 1381  Minutes: 12        Signature: Electronically signed by LUKE Peterson on 2020 at 11:48 AM

## 2020-07-02 NOTE — PLAN OF CARE
Problem: Falls - Risk of:  Goal: Will remain free from falls  Description: Will remain free from falls  Outcome: Ongoing  Goal: Absence of physical injury  Description: Absence of physical injury  Outcome: Ongoing     Problem: Skin Integrity:  Goal: Will show no infection signs and symptoms  Description: Will show no infection signs and symptoms  Outcome: Ongoing  Goal: Absence of new skin breakdown  Description: Absence of new skin breakdown  Outcome: Ongoing

## 2020-07-02 NOTE — PROGRESS NOTES
[facility]   Facility/Department: [facility]  Speech Language Pathology  Initial Speech/Language/Cognitive Assessment    NAME:Derek Ferreira  : 1940 (15 y.o.)   MRN: [id number]  ROOM: [place] [address]  ADMISSION DATE: 2020  PATIENT DIAGNOSIS(ES): Abnormality of gait and mobility [R26.9]  Abnormality of gait and mobility [R26.9]  No chief complaint on file.     Patient Active Problem List    Diagnosis Date Noted    BMI 22.0-22.9, adult 2020    Abnormality of gait and mobility 2020    Hematoma     Orthostasis 2020    Gait abnormality due to Severe Parkinson's Disease with Blunt Chest Trauma; rehab admission 20. 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    Primary orthostatic hypotension 2018    Macular degeneration     Glaucoma     BPH (benign prostatic hyperplasia)     Anemia 2014    Aortic valve stenosis, severe 2013    LVH (left ventricular hypertrophy) 91/10/4513    Diastolic dysfunction     Hypertension 2011     Past Medical History:   Diagnosis Date    Actinic keratoses     Aortic valve stenosis, severe 2013    BPH (benign prostatic hyperplasia)     Cancer ([address] 2018    skin cancer on chest removed    Colon polyp     Diastolic dysfunction     Stage 2 by echo    ED (erectile dysfunction) 2011    Glaucoma     Dr. Lisha Camara Hemochromatosis     History of echocardiogram 2015    mild AS, mild AR, mild TR, mild MR    [place]    Hypertension     Iron deficiency anemia 2014    LVH (left ventricular hypertrophy) 9/20/2013    Macular degeneration     bilateral; dry on left and wet on right-Dr. Logan See on back 5/14/2015    Osteoarthritis cervical spine     Parkinson disease [facility]     Dr. Wells      Past Surgical History:   Procedure Laterality Date    BACK SURGERY      CARDIAC CATHETERIZATION  11/09/2017    [facility]AR [facility]CHOL[facility] MD    CATARACT REMOVAL      COLONOSCOPY  11/2009    single polyp right colon    EYE SURGERY Bilateral     OTHER SURGICAL HISTORY  06/08/15    EXC SUBCU MASS BACK    TONSILLECTOMY         DATE ONSET: 6/29/2020    Date of Evaluation: 7/2/2020   Evaluating Therapist: GIANNI Nieves-SLP    Assessment:      Diagnosis: Pt presents with mild cognitive-linguistic impairment characterized by decreased short-term memory, insight, and high-level problem solving negatively impacting his ability to safely complete ADLs upon discharge. Pt also presents with mild dysphonia characterized by decreased vocal loudness and intelligibility negatively impacting his ability to effectively communicate wants and needs with caregivers upon discharge. Recommendations:  Requires SLP Intervention: Yes  Duration/Frequency of Treatment: 1-3x/wk for LOS or until all goals met. D/C Recommendations: To be determined        Goals:  Short-term Goals  Timeframe for Short-term Goals: 2-3 weeks  Goal 1: Pt will complete mid-high level problem solving task related to ADLs with 80% acc given cues as needed in order to promote safety with the completion of ADLs upon discharge. Goal 2: Pt will complete immediate and delayed recall task with 80% acc given cues as needed in order to promote safety with the completion of ADLs upon discharge. Goal 3: Pt will be educated on 3 memory strategies and will state their use in a functional activity with 80% acc given cues as needed in order to promote safety with the completion of ADLs.   Goal 4: Pt will be educated on speech intelligibility strategies (over articulation, abdominal breathing) and will demonstrate use at sentence level with 80% acc given cues as needed in order to promote effective communication of wants and needs with caregivers upon discharge. Goal 5: Within 1-3 days of the implementation of ST POC, pt will participate in further assessment of medication and financial managment. New goals to be added as deemed necessary. Long-term Goals  Timeframe for Long-term Goals: 2-3 weeks  Goal 1: Pt will increase cognitive-linguistic skills to standby assist level in order to promote safety with the completion of ADLs upon discharge. Goal 2: Pt will increase speech intelligibility to min assist level in order to effectively communicate wants and needs with caregivers upon discharge. Patient/family involved in developing goals and treatment plan: Yes    Subjective:   Previous level of function and limitations: See below  General  Chart Reviewed: Yes  Patient assessed for rehabilitation services?: Yes  Family / Caregiver Present: Yes(Wife present for half of evaluation.)  Subjective  Subjective: Pt was alert, cooperative, and pleasant for SLE. Social/Functional History  Lives With: Spouse  Education: 3 years of college. 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[employer], estate sales, get together with friends  Vision  Vision: Impaired(Pt has macular degeneration.)  Vision Exceptions: Wears glasses for reading  Hearing  Hearing: Within functional limits        Objective:      Auditory Comprehension  Comprehension: Within Functional Limits  Basic Questions: [facility])  Two Step Basic Commands: (WFL)  Multistep Basic Commands: (WFL)  Complex/Abstract Commands: (WFL)    Reading Comprehension  Reading Status: Unable to assess    Expression  Primary Mode of Expression: Verbal    Verbal Expression  Verbal Expression: Within functional limits  Repetition: Mild(Secondary to memory)  Convergent: [facility])  Divergent: [facility])    Written Expression  Dominant Hand: Right  Written Expression: Exceptions to [facility]  Legibility Impairment Severity: Mild(Pt displayed tremors in extremeties when writing.)    Motor Speech  Motor Speech: Exceptions to WFL(Pt demonstrates decreased loudness secondary to Parkinson's disease.)    Pragmatics/Social Functioning  Pragmatics: Exceptions to [facility]  Affect: Mild  Monotone: Mild    Cognition:      Orientation  Overall Orientation Status: Within Functional Limits  Orientation Level: Oriented to place;Oriented to time;Oriented to situation;Oriented to person  Attention  Attention: Within Functional Limits  Selective Attention: [facility])  Sustained Attention: [facility])  Memory  Memory: Exceptions to [facility]  Short-term Memory: Moderate(Pt able to recall 1 of 3 words after 5-minute delay. Pt able to recall other 2 words when given a category cue.)  Working Memory: [facility])  Problem Solving  Problem Solving: Exceptions to WFL(Pt displayed tangential responses to problem solving questions.)  Complex Functional Tasks: To be assessed in therapy  Managing Finances: To be assessed in therapy(Pt reports that his wife \"mostly\" manages his finances.)  Managing Medications: To be assessed in therapy(Pt reports that his wife \"mostly\" manages his medications.)  Simple Functional Tasks: [facility])  Numeric Reasoning  Numeric Reasoning: Unable to assess  Abstract Reasoning  Abstract Reasoning: Within Functional Limits  Safety/Judgement  Safety/Judgement: Exceptions to [facility]  Complex Functional Tasks: To be assessed in therapy  Insight: Mild      Additional Assessments:    Portions of the RIPA-2 [facility] Information Processing Assessment-2nd Edition ) were administered. Scores are as follows:  Subtest: Raw Score Standard Score   I. Immediate Memory 18 8   II. Recent Memory 28 13   III. Temporal Orientation 27 11   VIII.  Problem Solving and          Abstract Reasoning 24 11        Clock Drawing Score: (scoring obtained from the CLQT, Cognitive Linguistic Quick Test)  7/13, indicating moderate impairment. Errors included: Tangential utterances        Prognosis:  Speech Therapy Prognosis  Prognosis: Fair  Prognosis Considerations: Participation Level; Family/Community Support;Medical Diagnosis  Additional Comments: Pt has been diagnosed with Parkinson's Disease. Individuals consulted  Consulted and agree with results and recommendations: Patient; Family member  Family member consulted: Pt Wife    Education:  Patient Education: Pt was educated on results of SLE. Patient Education Response: Verbalizes understanding  Safety Devices in place: Yes  Type of devices: Call light within reach; All fall risk precautions in place; Left in chair;Chair alarm in place    Pain:  Pain Assessment  Patient Currently in Pain: No  Pain Assessment: 0-10  Pain Location: Back  Pain Orientation: Right, Lower  Pain Descriptors: Discomfort  Pain Frequency: (Pt reports he is not currently in pain, but experiences pain when he moves.)    [id number] [address] (NOMS):  SPOKEN LANGUAGE COMPREHENSION  Ratin    SPOKEN LANGUAGE EXPRESSION  Ratin    MOTOR SPEECH  Ratin    PROBLEM SOLVING  Ratin-6    MEMORY  Ratin             Therapy Time  SLP Individual Minutes  Time In: 1820  Time Out: 4192  Minutes: 38          Signature: Electronically signed by LUKE Thomson on 2020 at 11:28 AM

## 2020-07-02 NOTE — PROGRESS NOTES
Patient returned from PT, up in chair with chair alarm on and call light within reach. Patient is confused at times and impulsive. Avasys placed in patient room for safety.

## 2020-07-02 NOTE — FLOWSHEET NOTE
RN was told by nurse giving report that the patient was supposed to have platelets. RN did not find an order. Will pass it on to dayshift to contact medical. He did recieve 1 unit of PRBC's on [address] [facility].

## 2020-07-02 NOTE — PROGRESS NOTES
Facility/Department: [facility] Initial Assessment: Physical Therapy  Room: R248/R248-01    NAME: Irina Forde  : 1940  MRN: [id number]    Date of Service: 2020    Rehab Diagnosis(es): Gait abnormality due to Severe Parkinson's Disease with Blunt Chest Trauma; rehab admission 20  Patient Active Problem List    Diagnosis Date Noted    BMI 22.0-22.9, adult 2020    Abnormality of gait and mobility 2020    Hematoma     Orthostasis 2020    Gait abnormality due to Severe Parkinson's Disease with Blunt Chest Trauma; rehab admission 20. 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 Love Beard Hemochromatosis     History of echocardiogram 2015    mild AS, mild AR, mild TR, mild MR    [place]    Hypertension     Iron deficiency anemia 2014    LVH (left ventricular hypertrophy) 2013    Macular degeneration bilateral; dry on left and wet on right-Dr. Danii Pierre Mass on back 5/14/2015    Osteoarthritis cervical spine     Parkinson disease [facility]     Dr. Darryl Armenta     Past Surgical History:   Procedure Laterality Date    BACK SURGERY      CARDIAC CATHETERIZATION  11/09/2017    [facility] MD    CATARACT REMOVAL      COLONOSCOPY  11/2009    single polyp right colon    EYE SURGERY Bilateral     OTHER SURGICAL HISTORY  06/08/15    EXC SUBCU MASS BACK    TONSILLECTOMY         Chart Reviewed: Yes  Patient assessed for rehabilitation services?: Yes  Family / Caregiver Present: Yes(spouse)  Diagnosis: Gait abnormality due to Severe Parkinson's Disease with Blunt Chest Trauma; rehab admission 07/01/20  General Comment  Comments: Pt sitting up in chair - agreeable to PT Evaluation    Restrictions:  Restrictions/Precautions: Fall Risk     SUBJECTIVE: Subjective: \"I have to go home this afternoon. \"    Pre Treatment Pain Screening  Pain at present: 0  Scale Used: Numeric Score  Intervention List: Patient able to continue with treatment  Comments / Details: denies. Pt states that he only has pain (LBP) when he moves.     Post Treatment Pain Screening:  Pain Assessment  Pain Assessment: (denies)    Prior Level of Function:  Social/Functional History  Lives With: Spouse  Type of Home: House  Home Layout: Two level, Bed/Bath upstairs, 1/2 bath on main level(handrail both sides for steps to second floor )  Home Access: Level entry  Bathroom Shower/Tub: Walk-in shower  Bathroom Toilet: Standard  Bathroom Equipment: Grab bars in shower, Hand-held shower(plans to buy shower chair)  Home Equipment: Rolling walker(rollator )  Receives Help From: Family  ADL Assistance: [facility] [facility] [address]: Independent  Homemaking Responsibilities: Yes  Ambulation Assistance: Independent(cane or rollator prn)  Transfer Assistance: Independent(occassional help with bed mobility on \"bad days\")  Active : No  Education: 2 years of college  Occupation: Retired  Type of occupation: [employer]  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 reports he enjoys walking. Has 3 children who do not live locally    OBJECTIVE:   Vision/Hearing:  Vision Exceptions: Wears glasses for reading  Hearing: Within functional limits    Cognition/Observation:  Overall Orientation Status: Within Functional Limits  Follows Commands: Within Functional Limits  Observation/Palpation  Observation: No acute distress noted. Pt seeming frustrated and anxious about the need for him to be discharged today. Hyperfocused on subject requiring frequent redirection. Edema: R lower back large hematoma    ROM:  RLE PROM: WFL  RLE General PROM: noted tightness throughout hamstrings and heelcords  LLE PROM: WFL  LLE General PROM: noted tightness throughout hamstrings and heelcords  Spine  Lumbar: decreased lordosis    Strength:  Strength RLE  Strength RLE: WFL  Strength LLE  Strength LLE: WFL    Neuro:  Sensation  Overall Sensation Status: WFL     Balance  Comments: Pt stands with SPC and SBA, however exhibits poor reactive stepping strategies and poor awareness to limits of stability as well as proactive righting responses. Motor Control  Gross Motor?: WFL(impulsive; no bradykinesia observed, however pt exhibits festinating gait)    Bed mobility  Rolling to Left: Minimal assistance  Rolling to Right: Minimal assistance  Supine to Sit: Minimal assistance  Sit to Supine: Minimal assistance  Comment: Increased time and effort. Pt very painful. Multiple attempts to complete after instructing in logroll. Transfers  Sit to Stand: Contact guard assistance  Stand to sit: Contact guard assistance  Bed to Chair: Contact guard assistance  Comment: Impulsive. Quick to complete. Heavy bracing on SPC    Ambulation 1  Surface: level tile  Device: Lyons rail;Hand-Held Assist;Single point cane  Assistance: Minimal assistance; Moderate assistance  Quality of Gait: Pt impulsive. Festinating gait pattern. Increasing shakiness with distance. Pt tends to heavily wall/furniture surf and braces heavily on SPC. after 25ft, pt with significant tremoring and therapist instructing pt to turn around to sit. Pt requiring Cheo HHA and ModA postural control to assist pt safely back to chair. Mulitple LOB with minimal ability to self correct. Poor awareness to limitations. Distance: 25ft X 2    Stairs/Curb  Stairs?: No(safety concerns)         Activity Tolerance  Activity Tolerance: Patient Tolerated treatment well          Quality Indicators (IRF-MIGUEL):  Rolling L and R: Partial/Moderate Assistance (helper does <50%) - 3  Sit>Supine: Partial/Moderate Assistance (helper does <50%) - 3  Supine>Sit: Partial/Moderate Assistance (helper does <50%) - 3  Sit>Stand: Supervision or Touching Assistance - 4  Chair/Bed>Chair Transfer: Supervision or Touching Assistance - 4  Car Transfers: Not attempted due to Medical Condition or Safety Concerns (I.e. unsafe or physician orders) - 80  Walk 10 ft: Partial/Moderate Assistance (helper does <50%) - 3  Walk 50 ft with two 90 degree turns: Not attempted due to Medical Condition or Safety Concerns (I.e. unsafe or physician orders) - 80  Walk 150 ft in [address]: Not attempted due to Medical Condition or Safety Concerns (I.e. unsafe or physician orders) - 80  Walking 10 ft on Unlevel Surface: Not attempted due to Medical Condition or Safety Concerns (I.e. unsafe or physician orders) - 80  Picking up Objects from Standing Position: Not attempted due to Medical Condition or Safety Concerns (I.e. unsafe or physician orders) - 80  Stairs: No Not attempted due to medical condition or safety concerns (i.e. unsafe or physician order) - 88  WC Mobility: No Not Applicable (pt did not complete item prior to admission) - 9    ASSESSMENT:  Body structures, Functions, Activity limitations: Decreased functional mobility ; Decreased Minutes 25     8 Minutes(transfers)       Tressie Mohs, PT, 07/02/20 at 1:43 PM

## 2020-07-03 LAB
ANISOCYTOSIS: ABNORMAL
ANISOCYTOSIS: ABNORMAL
ATYPICAL LYMPHOCYTE RELATIVE PERCENT: 1 %
BANDED NEUTROPHILS RELATIVE PERCENT: 1 %
BASOPHILS ABSOLUTE: 0 K/UL (ref 0–0.2)
BASOPHILS ABSOLUTE: 0.1 K/UL (ref 0–0.2)
BASOPHILS RELATIVE PERCENT: 0.7 %
BASOPHILS RELATIVE PERCENT: 2 %
BLOOD BANK DISPENSE STATUS: NORMAL
BLOOD BANK PRODUCT CODE: NORMAL
BPU ID: NORMAL
DESCRIPTION BLOOD BANK: NORMAL
EOSINOPHILS ABSOLUTE: 0.1 K/UL (ref 0–0.7)
EOSINOPHILS ABSOLUTE: 0.3 K/UL (ref 0–0.7)
EOSINOPHILS RELATIVE PERCENT: 2 %
EOSINOPHILS RELATIVE PERCENT: 7 %
HCT VFR BLD CALC: 24.2 % (ref 42–52)
HCT VFR BLD CALC: 25 % (ref 42–52)
HEMOGLOBIN: 8.3 G/DL (ref 14–18)
HEMOGLOBIN: 8.4 G/DL (ref 14–18)
HYPOCHROMIA: ABNORMAL
HYPOCHROMIA: ABNORMAL
LYMPHOCYTES ABSOLUTE: 0.9 K/UL (ref 1–4.8)
LYMPHOCYTES ABSOLUTE: 0.9 K/UL (ref 1–4.8)
LYMPHOCYTES RELATIVE PERCENT: 17 %
LYMPHOCYTES RELATIVE PERCENT: 18 %
MACROCYTES: ABNORMAL
MCH RBC QN AUTO: 34.9 PG (ref 27–31.3)
MCH RBC QN AUTO: 35.9 PG (ref 27–31.3)
MCHC RBC AUTO-ENTMCNC: 33.2 % (ref 33–37)
MCHC RBC AUTO-ENTMCNC: 34.5 % (ref 33–37)
MCV RBC AUTO: 104.2 FL (ref 80–100)
MCV RBC AUTO: 105.3 FL (ref 80–100)
METAMYELOCYTES RELATIVE PERCENT: 2 %
METAMYELOCYTES RELATIVE PERCENT: 2 %
MONOCYTES ABSOLUTE: 0.3 K/UL (ref 0.2–0.8)
MONOCYTES ABSOLUTE: 0.3 K/UL (ref 0.2–0.8)
MONOCYTES RELATIVE PERCENT: 5.7 %
MONOCYTES RELATIVE PERCENT: 5.7 %
MYELOCYTE PERCENT: 1 %
MYELOCYTE PERCENT: 2 %
NEUTROPHILS ABSOLUTE: 3.1 K/UL (ref 1.4–6.5)
NEUTROPHILS ABSOLUTE: 4 K/UL (ref 1.4–6.5)
NEUTROPHILS RELATIVE PERCENT: 64 %
NEUTROPHILS RELATIVE PERCENT: 71 %
NUCLEATED RED BLOOD CELLS: 1 /100 WBC
NUCLEATED RED BLOOD CELLS: 2 /100 WBC
OVALOCYTES: ABNORMAL
PDW BLD-RTO: 21.2 % (ref 11.5–14.5)
PDW BLD-RTO: 21.9 % (ref 11.5–14.5)
PLATELET # BLD: 123 K/UL (ref 130–400)
PLATELET # BLD: 129 K/UL (ref 130–400)
PLATELET SLIDE REVIEW: ABNORMAL
PLATELET SLIDE REVIEW: ABNORMAL
POIKILOCYTES: ABNORMAL
POLYCHROMASIA: ABNORMAL
RBC # BLD: 2.33 M/UL (ref 4.7–6.1)
RBC # BLD: 2.38 M/UL (ref 4.7–6.1)
ROULEAUX: ABNORMAL
TEAR DROP CELLS: ABNORMAL
TOXIC GRANULATION: ABNORMAL
VACUOLATED NEUTROPHILS: PRESENT
WBC # BLD: 4.7 K/UL (ref 4.8–10.8)
WBC # BLD: 5.3 K/UL (ref 4.8–10.8)

## 2020-07-03 PROCEDURE — 6370000000 HC RX 637 (ALT 250 FOR IP): Performed by: INTERNAL MEDICINE

## 2020-07-03 PROCEDURE — 6370000000 HC RX 637 (ALT 250 FOR IP): Performed by: PHYSICAL MEDICINE & REHABILITATION

## 2020-07-03 PROCEDURE — 97535 SELF CARE MNGMENT TRAINING: CPT

## 2020-07-03 PROCEDURE — 97116 GAIT TRAINING THERAPY: CPT

## 2020-07-03 PROCEDURE — 36415 COLL VENOUS BLD VENIPUNCTURE: CPT

## 2020-07-03 PROCEDURE — 97530 THERAPEUTIC ACTIVITIES: CPT

## 2020-07-03 PROCEDURE — 97112 NEUROMUSCULAR REEDUCATION: CPT

## 2020-07-03 PROCEDURE — 2580000003 HC RX 258: Performed by: INTERNAL MEDICINE

## 2020-07-03 PROCEDURE — 97129 THER IVNTJ 1ST 15 MIN: CPT

## 2020-07-03 PROCEDURE — 97110 THERAPEUTIC EXERCISES: CPT

## 2020-07-03 PROCEDURE — 85025 COMPLETE CBC W/AUTO DIFF WBC: CPT

## 2020-07-03 PROCEDURE — 1180000000 HC REHAB R&B

## 2020-07-03 PROCEDURE — 99233 SBSQ HOSP IP/OBS HIGH 50: CPT | Performed by: PHYSICAL MEDICINE & REHABILITATION

## 2020-07-03 RX ADMIN — MENTHOL AND METHYL SALICYLATE: 7.6; 29 OINTMENT TOPICAL at 09:33

## 2020-07-03 RX ADMIN — FERROUS SULFATE TAB 325 MG (65 MG ELEMENTAL FE) 325 MG: 325 (65 FE) TAB at 09:29

## 2020-07-03 RX ADMIN — Medication 100 MG: at 17:10

## 2020-07-03 RX ADMIN — CARBIDOPA AND LEVODOPA 1 TABLET: 25; 100 TABLET ORAL at 21:55

## 2020-07-03 RX ADMIN — VITAMIN D, TAB 1000IU (100/BT) 2000 UNITS: 25 TAB at 17:10

## 2020-07-03 RX ADMIN — FERROUS SULFATE TAB 325 MG (65 MG ELEMENTAL FE) 325 MG: 325 (65 FE) TAB at 17:09

## 2020-07-03 RX ADMIN — LATANOPROST 1 DROP: 50 SOLUTION OPHTHALMIC at 21:55

## 2020-07-03 RX ADMIN — NYSTATIN 500000 UNITS: 100000 SUSPENSION ORAL at 09:30

## 2020-07-03 RX ADMIN — Medication 10 ML: at 21:55

## 2020-07-03 RX ADMIN — MIDODRINE HYDROCHLORIDE 5 MG: 5 TABLET ORAL at 14:07

## 2020-07-03 RX ADMIN — NYSTATIN 500000 UNITS: 100000 SUSPENSION ORAL at 21:55

## 2020-07-03 RX ADMIN — BRIMONIDINE TARTRATE 1 DROP: 2 SOLUTION OPHTHALMIC at 09:30

## 2020-07-03 RX ADMIN — CYANOCOBALAMIN TAB 500 MCG 1000 MCG: 500 TAB at 09:29

## 2020-07-03 RX ADMIN — FOLIC ACID 1000 MCG: 1 TABLET ORAL at 09:30

## 2020-07-03 RX ADMIN — BRIMONIDINE TARTRATE 1 DROP: 2 SOLUTION OPHTHALMIC at 21:55

## 2020-07-03 RX ADMIN — CARBIDOPA AND LEVODOPA 1 TABLET: 25; 100 TABLET ORAL at 09:30

## 2020-07-03 RX ADMIN — MIDODRINE HYDROCHLORIDE 5 MG: 5 TABLET ORAL at 09:29

## 2020-07-03 RX ADMIN — NYSTATIN 500000 UNITS: 100000 SUSPENSION ORAL at 14:07

## 2020-07-03 RX ADMIN — MENTHOL AND METHYL SALICYLATE: 7.6; 29 OINTMENT TOPICAL at 21:55

## 2020-07-03 RX ADMIN — Medication 100 MG: at 06:14

## 2020-07-03 RX ADMIN — Medication 10 ML: at 09:34

## 2020-07-03 RX ADMIN — MIDODRINE HYDROCHLORIDE 5 MG: 5 TABLET ORAL at 17:09

## 2020-07-03 RX ADMIN — CARBIDOPA AND LEVODOPA 1 TABLET: 25; 100 TABLET ORAL at 14:07

## 2020-07-03 ASSESSMENT — PAIN DESCRIPTION - DESCRIPTORS
DESCRIPTORS: CONSTANT

## 2020-07-03 ASSESSMENT — PAIN DESCRIPTION - LOCATION
LOCATION: BACK
LOCATION: BACK
LOCATION: NECK
LOCATION: BACK
LOCATION: BACK

## 2020-07-03 ASSESSMENT — PAIN DESCRIPTION - PAIN TYPE
TYPE: ACUTE PAIN

## 2020-07-03 ASSESSMENT — PAIN DESCRIPTION - ORIENTATION
ORIENTATION: LEFT
ORIENTATION: RIGHT;LOWER
ORIENTATION: LOWER;RIGHT
ORIENTATION: POSTERIOR

## 2020-07-03 ASSESSMENT — PAIN SCALES - GENERAL
PAINLEVEL_OUTOF10: 4
PAINLEVEL_OUTOF10: 3
PAINLEVEL_OUTOF10: 2

## 2020-07-03 ASSESSMENT — PAIN DESCRIPTION - FREQUENCY: FREQUENCY: CONTINUOUS

## 2020-07-03 NOTE — PROGRESS NOTES
Subjective: The patient complains of severe  acute on chronic progressive weakness secondary to anemia partially relieved by blood transfusions, rest, PT, OT and exacerbated by current decline in H&H. I am concerned about patients low H&H and platelet count. ROS x10: The patient also complains of severely impaired mobility and activities of daily living. Otherwise no new problems with vision, hearing, nose, mouth, throat, dermal, cardiovascular, GI, , pulmonary, musculoskeletal, psychiatric or neurological. See Rehab H&P on Rehab chart dated . Vital signs:  /71   Pulse 71   Temp 98 °F (36.7 °C) (Oral)   Resp 17   Ht 6' (1.829 m)   Wt 165 lb (74.8 kg)   SpO2 99%   BMI 22.38 kg/m²   I/O:   PO/Intake:  fair PO intake, no problems observed or reported. Bowel/Bladder:  continent, no problems noted. General:  Patient is well developed, adequately nourished, non-obese and     well kempt. HEENT:    PERRLA, hearing intact to loud voice, external inspection of ear     and nose benign. Inspection of lips, tongue and gums benign  Musculoskeletal: No significant change in strength or tone. All joints stable. Inspection and palpation of digits and nails show no clubbing,       cyanosis or inflammatory conditions. Neuro/Psychiatric: Affect: flat but pleasant. Alert and oriented to person, place and     situation. No significant change in deep tendon reflexes or     Sensation-poor insight into his deficits  Lungs:  Diminished, CTA-B. Respiration effort is normal at rest.     Heart:   S1 = S2, RRR. No loud murmurs. Abdomen:  Soft, non-tender, no enlargement of liver or spleen. Extremities:  No significant lower extremity edema or tenderness. Skin:   Intact to general survey, no visualized or palpated problems.     Rehabilitation:  Physical therapy: FIMS:  Bed Mobility:      Transfers: Sit to Stand: Contact guard assistance  Stand to sit: Contact guard assistance  Bed to Chair: Contact guard assistance, Ambulation 1  Surface: carpet, level tile  Device: Rolling Walker  Assistance: Stand by assistance, Contact guard assistance  Quality of Gait: festinating gait, decreased luis heel strike and toe off, ff posture, steady pace  Gait Deviations: Shuffles, Decreased step length, Decreased step height, Slow Chilo  Distance: 150'  Comments: VC's for proper breating technique, some fatigue post ambulation,      FIMS:  ,  , Assessment: Pt demonstrated lack of awareness for safety when walking around objests and required VC for proper sitting safety. Pt also required VC for proper standing technique. Pt needed vrbal cues to slow chilo for better control and to increase walking safety. Occupational therapy: FIMS:   ,  , Assessment: Pt. is an [de-identified]year old man from home with spouse who presents to [facility] with the above deficits impacting his safety and mobility with ADL tasks. Pt. continues to benefit from OT to maximize independence and safety with ADL tasks.      Speech therapy: FIMS:        Lab/X-ray studies reviewed, analyzed and discussed with patient and staff:   Recent Results (from the past 24 hour(s))   CBC Auto Differential    Collection Time: 07/02/20 11:28 PM   Result Value Ref Range    WBC 5.3 4.8 - 10.8 K/uL    RBC 2.38 (L) 4.70 - 6.10 M/uL    Hemoglobin 8.3 (L) 14.0 - 18.0 g/dL    Hematocrit 25.0 (L) 42.0 - 52.0 %    .3 (H) 80.0 - 100.0 fL    MCH 34.9 (H) 27.0 - 31.3 pg    MCHC 33.2 33.0 - 37.0 %    RDW 21.2 (H) 11.5 - 14.5 %    Platelets 691 (L) 721 - 400 K/uL    PLATELET SLIDE REVIEW Decreased     Neutrophils % 71.0 %    Lymphocytes % 17.0 %    Monocytes % 5.7 %    Eosinophils % 2.0 %    Basophils % 0.7 %    Neutrophils Absolute 4.0 1.4 - 6.5 K/uL    Lymphocytes Absolute 0.9 (L) 1.0 - 4.8 K/uL    Monocytes Absolute 0.3 0.2 - 0.8 K/uL    Eosinophils Absolute 0.1 0.0 - 0.7 K/uL    Basophils Absolute 0.0 0.0 - 0.2 K/uL    Bands Relative 1 %    Metamyelocytes Relative 2 % Myelocyte Percent 2 %    nRBC 1 /100 WBC    Toxic Granulation 1+     Vacuolated Neutrophils Present     Anisocytosis 1+     Macrocytes 1+     Polychromasia 1+     Hypochromia 1+     Ovalocytes Occasional     Tear Drop Cells Occasional    CBC Auto Differential    Collection Time: 07/03/20  5:14 AM   Result Value Ref Range    WBC 4.7 (L) 4.8 - 10.8 K/uL    RBC 2.33 (L) 4.70 - 6.10 M/uL    Hemoglobin 8.4 (L) 14.0 - 18.0 g/dL    Hematocrit 24.2 (L) 42.0 - 52.0 %    .2 (H) 80.0 - 100.0 fL    MCH 35.9 (H) 27.0 - 31.3 pg    MCHC 34.5 33.0 - 37.0 %    RDW 21.9 (H) 11.5 - 14.5 %    Platelets 100 (L) 571 - 400 K/uL    PLATELET SLIDE REVIEW Decreased     Neutrophils % 64.0 %    Lymphocytes % 18.0 %    Monocytes % 5.7 %    Eosinophils % 7.0 %    Basophils % 2.0 %    Neutrophils Absolute 3.1 1.4 - 6.5 K/uL    Lymphocytes Absolute 0.9 (L) 1.0 - 4.8 K/uL    Monocytes Absolute 0.3 0.2 - 0.8 K/uL    Eosinophils Absolute 0.3 0.0 - 0.7 K/uL    Basophils Absolute 0.1 0.0 - 0.2 K/uL    Atypical Lymphocytes Relative 1 %    Metamyelocytes Relative 2 %    Myelocyte Percent 1 %    nRBC 2 /100 WBC    Anisocytosis 1+     Hypochromia 1+     Poikilocytes Occasional     Rouleaux 1+        Xr Lumbar Spine   6/29/2020    Nonspecific bowel gas pattern. Vascular calcifications noted abdominal aorta. Normal sagittal alignment lumbar spine. Possible fracture versus osteophyte anterior superior corner of L2. Moderate facet osteoarthropathy L3-S1. Moderate degenerative disc disease L5-S1. No Pars interarticularis fracture defect. Impression:  1. Possible chip fracture versus osteophyte anterior superior corner of L2, further evaluation with MRI of the lumbar spine recommended. 2. Moderate facet osteoarthropathy L3-S1 with moderate degenerative disc disease L5-S1. 3. Vascular calcifications abdominal aorta. Ct Head   6/29/2020    This examination was performed on a CT scanner with dose reduction.  Technique: Low-dose CT  acquisition SPINE FRACTURE. 6. NO PELVIC FRACTURE AND ADDITIONAL INCIDENTAL FINDINGS IN BODY OF REPORT. Xr Chest   2020    Lungs and pleura:  No consolidation. No pleural effusion. No pneumothorax. Normal pulmonary vascular pattern. Cardiomediastinal silhouette:  Stable cardiomediastinal silhouette. Other:  No acute osseous findings. Remote fracture deformity left proximal humerus. No acute radiographic abnormality. Us Carotid Artery   2020   Patient MRN:  [id number] : 1940 Age: [de-identified] years Gender: Male Order Date:  2020 2:00 PM EXAM: US CAROTID ARTERY BILATERAL NUMBER OF IMAGES:  54 INDICATION:  syncope, vertigo  COMPARISON: Carotid artery ultrasound 2017 FINDINGS: Right side: Proximal common carotid artery peak systolic velocity is 747 centimeters per second Mid, common carotid artery peak systolic velocity is 815 centimeters per second. Distal common carotid artery peak systolic velocity is 39.4 centimeters per second External carotid artery peak systolic velocity is 85.2 centimeters per second. Proximal internal carotid artery peak systolic velocity is 27.0 centimeters per second Mid internal carotid artery peak systolic velocity is 79.7 centimeters per second. Distal internal carotid artery peak systolic velocity is 46.6 centimeters per second Vertebral artery peak systolic velocity is 77.4 centimeters per second. Vertebral artery flow is antegrade ICA/CCA ratio is 0.85 Left side: Proximal common carotid artery peak systolic velocity is 924 centimeters per second Mid, common carotid artery peak systolic velocity is 096 centimeters per second. Distal common carotid artery peak systolic velocity is 95.3 centimeters per second. External carotid artery peak systolic velocity is 90.2 centimeters per second. Proximal internal carotid artery peak systolic velocity is 59.6 centimeters per second Mid internal carotid artery peak systolic velocity is 00.5 centimeters per second.  Distal internal carotid artery peak systolic velocity is 36.7 centimeters per second Vertebral artery peak systolic velocity is 29.5 centimeters per second. Vertebral artery flow is antegrade ICA/CCA ratio is 0.78. Scattered areas of calcified and noncalcified plaque bilateral internal carotid arteries and bilateral carotid bulb/bifurcations. Peak systolic velocities, ICA/CCA ratios and antegrade vertebral artery flow as above. See above for details of the examination and below for internal carotid artery interpretation guidelines. 1. No areas of estimated luminal stenosis greater than 0-49 percent within the bilateral internal carotid arteries. . 2. Scattered areas of calcified and noncalcified plaque bilateral internal carotid arteries and bilateral carotid artery bulb/bifurcations. GOSINK CRITERIA Diameter          PSV t            EDV t          PSV          EDV          Stenosis Site       %              cm/sec          cm/sec      ICA/CCA    ICA/CCA 0-49                 <124              <40             <2:1           ---                 --- 50-69              125-225                  >2:1           ---                 --- 70-89               225-325          >100          >4:1           >5:1              --- 90%+                >325              >100          >4:1           >9:1           Damped resistive CCA >95%                 Previous extensive, complex labs, notes and diagnostics reviewed and analyzed. ALLERGIES:    Allergies as of 07/01/2020 - Review Complete 07/01/2020   Allergen Reaction Noted    Cat hair extract Itching and Swelling 11/06/2018      (please also verify by checking MAR)     Today I evaluated this patient for periodic reassessment of medical and functional status.   The patient was discussed in detail at the treatment team meeting focusing on current medical issues, progress in therapies, social issues, psychological issues, barriers to progress and strategies to address these barriers, and discharge planning. See the addendum to rehab progress note-as a second progress note in the chart. The patient continues to be high risk for future disability and their medical and rehabilitation prognosis continue to be good and therefore, we will continue the patient's rehabilitation course as planned. The patient's tentative discharge date was set. Patient and family education was discussed. The patient was made aware of the team discussion regarding their progress. Complex Physical Medicine & Rehab Issues Assess & Plan:   1. Severe abnormality of gait and mobility and impaired self-care and ADL's secondary to progressive severe Parkinson's disease. Functional and medical status reassessed regarding patients ability to participate in therapies and patient found to be able to participate in acute intensive comprehensive inpatient rehabilitation program including PT/OT to improve balance, ambulation, ADLs, and to improve the P/AROM. Therapeutic modifications regarding activities in therapies, place, amount of time per day and intensity of therapy made daily. In bed therapies or bedside therapies prn.   2. Bowel Parkinson's related constipation and Bladder dysfunction neurogenic bladder:  frequent toileting, ambulate to bathroom with assistance, check post void residuals. Check for C.difficile x1 if >2 loose stools in 24 hours, continue bowel & bladder program.  Monitor bowel and bladder function. Lactinex 2 PO every AC.   MOM prn, Brown Bomb prn, Glycerin suppository prn, enema prn.  3. Severe mid and low back parkinsonian stiffness related pain as well as generalized OA pain: reassess pain every shift and prior to and after each therapy session, give prn Tylenol and low-dose Norco avoiding any NSAIDs, modalities prn in therapy, Lidoderm, K-pad prn.   4. Skin healing bilateral upper extremity bruising and breakdown risk:  continue pressure relief program.  Daily skin exams and reports from movement disorders in diseases classified elsewhere-focus on movement disorder and balance  8.    Primary orthostatic hypotension-check Ortho BPs titrate cardiac medications        Kath Johansen D.O., PM&R     Attending    [address]

## 2020-07-03 NOTE — PROGRESS NOTES
Occupational Therapy  Facility/Department: [facility]  Daily Treatment Note  NAME: Lindy Flowers  : 1940  MRN: [id number]    Date of Service: 7/3/2020    Discharge Recommendations:  Continue to assess pending progress       Assessment   Performance deficits / Impairments: Decreased functional mobility ; Decreased strength;Decreased endurance;Decreased coordination;Decreased ADL status; Decreased high-level IADLs;Decreased fine motor control;Decreased balance;Decreased cognition;Decreased safe awareness  Assessment: Pt with min difficulty to problem solve and demonstrates color blindness during 3D block design. Pt continues to benefit from OT services to maximize independence and safety during ADLs and IADLs. REQUIRES OT FOLLOW UP: Yes  Activity Tolerance  Activity Tolerance: Patient Tolerated treatment well  Activity Tolerance: fair+  Safety Devices  Safety Devices in place: Yes  Type of devices: All fall risk precautions in place         Patient Diagnosis(es): There were no encounter diagnoses. has a past medical history of Actinic keratoses, Aortic valve stenosis, severe, BPH (benign prostatic hyperplasia), Cancer ([address] Colon polyp, Diastolic dysfunction, ED (erectile dysfunction), Glaucoma, Hemochromatosis, History of echocardiogram, History of echocardiogram, Hypertension, Iron deficiency anemia, LVH (left ventricular hypertrophy), Macular degeneration, Mass on back, Osteoarthritis cervical spine, and Parkinson disease ([address] has a past surgical history that includes Colonoscopy (2009); back surgery; Cataract removal; eye surgery (Bilateral); Tonsillectomy; other surgical history (06/08/15); and Cardiac catheterization (2017).     Restrictions  Restrictions/Precautions  Restrictions/Precautions: Fall Risk  Subjective   General  Chart Reviewed: Yes  Patient assessed for rehabilitation services?: Yes  Family / Caregiver Present: Yes(wife)  Referring Practitioner: Dr. Isadora Caballero  Diagnosis:

## 2020-07-03 NOTE — FLOWSHEET NOTE
1024 Dr Myers Mail notified that the patient had an order for 2 units of blood and 1 units of platelets from 7/1 and only received 1 unit of PRBC. Last labs 7/2 0733 hgb 7.9 hct 23.7 last platelet count 7/1 1895 was 91. No current signs of active bleeding witnessed by this RN.    1025 Per Dr Myers Mail hold infusions for now and he will order a CBC.    1200 Dr Myers Mail notified of Hgb 8.3 Hct 25.0 Platelets 195. See orders. 0630 Pt slept well overnight. No complaints of pain. No BM overnight. Avasys in room for safety, pt impulsive at times.

## 2020-07-03 NOTE — CARE COORDINATION
Social/Functional Status:  Social/Functional History  Lives With: Spouse  Type of Home: House  Home Layout: Two level, Bed/Bath upstairs, 1/2 bath on main level(handrail both sides for steps to second floor )  Home Access: Level entry  Bathroom Shower/Tub: Walk-in shower  Bathroom Toilet: Standard  Bathroom Equipment: Grab bars in shower, Hand-held shower(plans to buy shower chair)  Home Equipment: Rolling walker(rollator )  Receives Help From: Family  ADL Assistance: Independent  Homemaking Assistance: Independent  Homemaking Responsibilities: Yes  Ambulation Assistance: Independent(cane or rollator prn)  Transfer Assistance: Independent(occassional help with bed mobility on \"bad days\")  Active : No  Education: 2 years of college  Occupation: Retired  Type of occupation: [employer]  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 reports he enjoys walking. Has 3 children who do not live locally    Spoke with patient and explained role in the team. Patient questions answered appropriately. Explained discharge process. Patient stated understanding. Patient wife present for visit. Per patient, he was independent prior to admission. Patienthas a rollator or cane. Patient was independent prior to admission.  Electronically signed by Heather Trinidad RN on 7/3/2020 at 11:58 AM

## 2020-07-03 NOTE — PROGRESS NOTES
Physical Therapy Rehab Treatment Note  Facility/Department: [facility]  Room: 41/M680-06       NAME: Zeinab Fortune  : 1940 (53 y.o.)  MRN: [id number]  CODE STATUS: Full Code    Date of Service: 7/3/2020  Chart Reviewed: Yes  Family / Caregiver Present: No    Restrictions:  Restrictions/Precautions: Fall Risk       SUBJECTIVE: Subjective: \"This afternoon is good. \"  Pain Screening  Patient Currently in Pain: Yes  Pre Treatment Pain Screening  Pain at present: 2  Intervention List: Patient able to continue with treatment  Comments / Details: Pt states that pain can go up to a 10 with movement but stays at about a two constantly. Post Treatment Pain Screening:  Pain Assessment  Pain Assessment: 0-10  Pain Level: 2  Pain Type: Acute pain  Pain Location: Back  Pain Orientation: Left  Pain Descriptors: Constant    OBJECTIVE:   Neuromuscular Education  Neuromuscular Comments: Ryan Martin in and out of obstacles with rollator in busy environment and in tight spaces to promote improved balance and environmental scanning. Fwd stepping over objects to improve step height and length     Transfers  Sit to Stand: Contact guard assistance  Stand to sit: Contact guard assistance  Comment: Improved safety and technique this PM    Ambulation  Ambulation?: Yes  Ambulation 1  Surface: carpet  Device: Rollator  Assistance: Stand by assistance  Quality of Gait: festinating gait, decreased luis heel strike and toe off, ff posture, steady pace  Gait Deviations: Shuffles;Decreased step length;Decreased step height;Slow Renetta  Distance: 100'  Comments: vc's for keeping rollator closer    Exercises   Comments: 2\" step ups BLE x10     ASSESSMENT/COMMENTS:  Assessment: Good carryover this PM from vc's from this AM's session, pt displaying improved safety and technique with trsfs and approach to chair. Increased strength demonstrated this session secondary to pt being able to perform step up exercises.  Pt challenged with manuevering rollator in tight spaces, vc's needed to avoid hitting objects, good follow through.      PLAN OF CARE/Safety:   Plan Comment: Cont PT POC      Therapy Time:   Individual   Time In 1400   Time Out 1430   Minutes 30     Minutes:      Transfer/Bed mobility trainin      Gait trainin      Neuro re education: 10     Therapeutic ex: [address], PTA, 20 at 3:23 PM

## 2020-07-03 NOTE — PLAN OF CARE
Problem: Falls - Risk of:  Goal: Will remain free from falls  Description: Will remain free from falls  Outcome: Ongoing  Goal: Absence of physical injury  Description: Absence of physical injury  Outcome: Ongoing     Problem: Skin Integrity:  Goal: Will show no infection signs and symptoms  Description: Will show no infection signs and symptoms  Outcome: Ongoing  Goal: Absence of new skin breakdown  Description: Absence of new skin breakdown  Outcome: Ongoing     Problem: Mobility - Impaired:  Goal: Mobility will improve  Description: Mobility will improve  Outcome: Ongoing     Problem: IP COMMUNICATION/DYSARTHRIA  Goal: LTG - patient will improve expressive language skills to allow for communication of wants and needs in daily activities  Outcome: Ongoing  Goal: LTG - patient will utilize compensatory intervention for intelligibility  Outcome: Ongoing     Problem: IP SWALLOWING  Goal: LTG - patient will tolerate the least restrictive diet consistency to allow for safe consumption of daily meals  Outcome: Ongoing     Problem: IP BALANCE  Goal: LTG - Patient will maintain balance to allow for safe/functional mobility  Outcome: Ongoing     Problem: Pain:  Goal: Pain level will decrease  Description: Pain level will decrease  Outcome: Ongoing  Goal: Control of acute pain  Description: Control of acute pain  Outcome: Ongoing  Goal: Control of chronic pain  Description: Control of chronic pain  Outcome: Ongoing

## 2020-07-03 NOTE — PROGRESS NOTES
Physical Therapy Rehab Treatment Note  [facility]       NAME: Irina Forde  : 1940 (38 y.o.)  MRN: [id number]  CODE STATUS: Full Code    Date of Service: 7/3/2020  Chart Reviewed: Yes  Family / Caregiver Present: No    Restrictions:  Restrictions/Precautions: Fall Risk       SUBJECTIVE: Subjective: \"I'm feeling pretty good. \"  Pain Screening  Patient Currently in Pain: Yes  Pre Treatment Pain Screening  Pain at present: 3  Intervention List: Patient able to continue with treatment;Patient declined any intervention  Comments / Details: Pt states that pain can go up to a 10 with movement but stays at about a two constantly. Post Treatment Pain Screening:  Pain Assessment  Pain Assessment: 0-10  Pain Level: 2  Pain Type: Acute pain  Pain Location: Back  Pain Descriptors: Constant    OBJECTIVE:        Transfers  Sit to Stand: Contact guard assistance  Stand to sit: Contact guard assistance  Comment: VC's/TC's for safe approach to chair    Ambulation  Ambulation?: Yes  Ambulation 1  Surface: carpet;level tile  Device: Rolling Walker  Assistance: Stand by assistance;Contact guard assistance  Quality of Gait: festinating gait, decreased luis heel strike and toe off, ff posture, steady pace  Gait Deviations: Shuffles;Decreased step length;Decreased step height;Slow Renetta  Distance: 150'  Comments: VC's for proper breating technique, some fatigue post ambulation    Exercises  Hamstring Sets: x20 YTB  Gluteal Sets: x20  Hip Flexion: x20  Hip Abduction: x20 YTB / x20 Ball squeezes   Knee Long Arc Quad: x20  Ankle Pumps: x20   Comments: 2\" step taps with fwd weight shifts BLE x10 reps x2 sets     ASSESSMENT/COMMENTS:  Assessment: Decreased safety noted with approach to chair for stand > sit trsf. VC's needed to correct postioning as pt was far left of chair.  Step taps utilized this session to prepare pt for strair training while increasing step height and length to improve overall quality of gait. Step up exercises trialed, pt unable to advance RLE onto 2\" step this date. Increased time and effort required secondary to frequent sitting RB's.     PLAN OF CARE/Safety:   Plan Comment: Cont PT POC      Therapy Time:   Individual   Time In 0900   Time Out 1000   Minutes 60     Minutes:      Transfer/Bed mobility training: 15      Gait training: 15      Neuro re education: 0     Therapeutic ex: [address], PTA, 07/03/20 at 11:57 AM

## 2020-07-03 NOTE — PROGRESS NOTES
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 Pt continues to benefit from OT services to maximize independence and safety during ADLs and IADLs. REQUIRES OT FOLLOW UP: Yes  Activity Tolerance  Activity Tolerance: Patient Tolerated treatment well  Activity Tolerance: fair+  Safety Devices  Safety Devices in place: Yes  Type of devices: All fall risk precautions in place         Patient Diagnosis(es): There were no encounter diagnoses. has a past medical history of Actinic keratoses, Aortic valve stenosis, severe, BPH (benign prostatic hyperplasia), Cancer ([facility] [address] Colon polyp, Diastolic dysfunction, ED (erectile dysfunction), Glaucoma, Hemochromatosis, History of echocardiogram, History of echocardiogram, Hypertension, Iron deficiency anemia, LVH (left ventricular hypertrophy), Macular degeneration, Mass on back, Osteoarthritis cervical spine, and Parkinson disease ([facility] [address] has a past surgical history that includes Colonoscopy (2009); back surgery; Cataract removal; eye surgery (Bilateral); Tonsillectomy; other surgical history (06/08/15); and Cardiac catheterization (2017).     Restrictions  Restrictions/Precautions  Restrictions/Precautions: Fall Risk  Subjective   General  Chart Reviewed: Yes  Patient assessed for rehabilitation services?: Yes  Family / Caregiver Present: Yes(wife)  Referring Practitioner: Dr. Stephani Mendieta  Diagnosis: Impaired mobility and ADLs 2° to severe parkinson's disease with blunt chest trauma  Pain Assessment  Pain Assessment: 0-10  Pain Level: 4  Pain Type: Acute pain  Pain Location: Neck  Pain Orientation: Posterior  Pain Descriptors: Constant  Pain Frequency: Continuous  Pre Treatment Pain Screening  Pain at present: 4  Scale Used: Numeric Score  Intervention List: Patient able to continue with treatment;Patient declined any intervention  Vital Signs  Patient Currently in Pain: Yes   Orientation  Orientation  Overall Orientation Status: Within Functional Limits  Objective    ADL  Grooming: Supervision(pt stood sinkside to complete oral care, hand hygiene, and wash his face)  Toileting: Supervision  Additional Comments: Pt seated on commode upon OT arrival        Balance  Standing Balance: Supervision  Functional Mobility  Functional - Mobility Device: Cane  Activity: Other(from toilet to the sink)  Assist Level: Supervision  Toilet Transfers  Toilet - Technique: Ambulating  Equipment Used: Grab bars  Toilet Transfer: Supervision     Transfers  Sit to stand: Supervision  Stand to sit: Supervision      Pt's demonstrates decreased memory during first half of OT session. Pt asked therapist 3 times within 15 minutes if therapist works tomorrow after therapist stated yes. Pt did not realize he had already asked. While seated at tabletop, pt replicated 3D graphic designs using colored blocks and B UEs to improve problem solving during ADLs and IADLs. Pt completed 1 mildly complex design with no errors and then replicated 2 moderately complex designs with min difficulty Pt requires min verbal cues to correct errors. Pt demonstrates min difficulty to differentiate between purple and brown and red and orange blocks and states \"oh I must be colorblind\". Pt then returned blocks to the container one at a time without difficulty.             Plan   Plan  Times per week: 5-7x  Plan weeks: 1.5 weeks  Current Treatment Recommendations: Strengthening, Functional Mobility Training, Patient/Caregiver Education & Training, Home Management Training, Cognitive/Perceptual Training, Pain Management, Balance Training, Neuromuscular Re-education, Self-Care / ADL, Safety Education & Training, Endurance Training  Plan Comment: Continue per OT POC  G-Code     OutComes Score                                                  AM-PAC Score             Goals  Patient Goals   Patient goals : \"I want to get back to independence\"       Therapy Time   Individual Concurrent Group Co-treatment   Time In 1020         Time Out 1050         Minutes 30           ADL trainin minutes  Cognitive Retrainin minutes     Bob Gonzales OT   Electronically signed by Bob Gonzales OT on 7/3/2020 at 11:52 AM

## 2020-07-03 NOTE — PROGRESS NOTES
INDIVIDUALIZED OVERALL REHAB PLAN OF CARE  ADDENDUM TO REHAB PROGRESS NOTE-for audit purposes must also refer to this day's clinical note and combine the information      Date: 7/3/2020  Patient Name: Demetrio Hooper   Room: W090/M515-63    MRN: [id number]    : 1940  ([de-identified] y.o.)  Gender: male       Today 7/3/2020 during weekly team meeting, I reviewed the patient Demetrio Hooper in detail with the therapists and nurses involved in patient's care gathering complex physiatric data regarding current medical issues, progress in therapies, factors limiting progress, social issues, psychological issues, ongoing therapeutic plans and discharge planning. Legend:  I= independent Im =Modified independent  S=Supervised SB=stand by LUND=set up CG=contact jac Min= minimal Mod=Moderate Max=maximal Max of 2 =maximal assist of 2 people      CURRENT FUNCTIONAL STATUS:    NURSING ISSUES:         Nursing will continue to focus on bowel and bladder continence transitioning toward independence by time of discharge. Monitoring post void residuals monitoring for severe constipation and bowel obstruction. Focus on achieving ADL goals with co-treating with OT when possible.     PHYSICAL THERAPY  Bed mobility:  Supine to Sit: Minimal assistance (20 1301)  Sit to Supine: Minimal assistance (20 1301)  Transfers:  Sit to Stand: Contact guard assistance (20 0916)  Bed to Chair: Contact guard assistance (20 1328)  Gait:   Device: Rolling Walker (20 09)  Assistance: Stand by assistance;Contact guard assistance (20 09)  Distance: 150' (20 09)  Quality of Gait: festinating gait, decreased luis heel strike and toe off, ff posture, steady pace (20 09)  Comments: VC's for proper breating technique, some fatigue post ambulation (20 09)  Stairs:     W/C mobility:         OCCUPATIONAL THERAPY  Hand Dominance: Right  ADL  Feeding: Independent (20 1253)  Grooming: Setup (20 1253)  UE Bathing: Setup (07/02/20 1253)  LE Bathing: Minimal assistance (07/02/20 1253)  UE Dressing: Setup (07/02/20 1253)  LE Dressing: Minimal assistance (07/02/20 1253)  Toileting: Minimal assistance (07/02/20 1253)  Additional Comments: Pt. engaged in shower ADL as above. Pt. demonstrates mild LOB and safety concerns during mobility; reports dizziness during mobility and requires cues for sequencing. (07/02/20 1253)  Toilet Transfers  Toilet - Technique: Ambulating (07/02/20 1133)  Equipment Used: Grab bars (07/02/20 1133)  Toilet Transfer: Stand by assistance (07/02/20 1133)     Shower Transfers  Shower - Transfer From: [employer] (07/02/20 1133)  Shower - Transfer Type: To and From (07/02/20 1133)  Shower - Transfer To: Shower seat with back (07/02/20 1133)  Shower - Technique: Ambulating (07/02/20 1133)  Shower Transfers: Contact Guard (07/02/20 1133)  Shower Transfers Comments: SBA to CGA, increased time, heavy use of grab bars (07/02/20 1133)      SPEECH THERAPY  Motor Speech: Exceptions to WFL(Pt demonstrates decreased loudness secondary to Parkinson's disease.)  Comprehension: Within Functional Limits  Verbal Expression: Within functional limits      Diet/Swallow:  Diet Solids Recommendation: Regular  Liquid Consistency Recommendation: Thin  Dysphagia Outcome Severity Scale: Level 5: Mild dysphagia- Distant supervision. May need one diet consistency restricted    Compensatory Swallowing Strategies: Small bites/sips, Eat/Feed slowly, Upright as possible for all oral intake             COGNITION  OT: Cognition Comment: Comprehension:  Mod I, Expression: Independent, Social interaction: Independent, Problem Solving: Min A, Memory- Supervision  SP:Memory: Exceptions to [facility]  Problem Solving: Exceptions to WFL(Pt displayed tangential responses to problem solving questions.)        THERAPY, MEDICAL AND NURSING COORDINATION:    []  Pain medication before therapies     []  Check orthostatic BP      [x]  Ambulate to the bathroom in room    [x]  Add scheduled rest beaks     []  In room therapies      Discharge date set for:         7/12/2020            Home with: Wife with help from    wife          And:     [address]:     [x]  PT    []  OT    []  ST   [x]  Aide   []  SW    [x]  RN                    Outpatient Therapy:  []  PT    []  OT    []  ST   []  Rehab Psych                 Equipment:  [employer]      At D/C their function is goaled at:   PT:Long term goal 1: Pt to complete all bed mobility with indep  Long term goal 2: Pt to complete all transfers with indep  Long term goal 3: Pt to ambulate 150ft with LRD and indep in protected/home environments, and supervision in the community  Long term goal 4: Pt to manage 4 steps with HR and indep; curb step with indep  OT:Eating  Assistance Needed: Independent  CARE Score: 6  Discharge Goal: Independent, Oral Hygiene  Assistance Needed: Setup or clean-up assistance  CARE Score: 5  Discharge Goal: Independent, [address] Needed: Supervision or touching assistance  CARE Score: 4  Discharge Goal: Independent, Shower/Bathe Self  Assistance Needed: Supervision or touching assistance  CARE Score: 4  Discharge Goal: Independent  Upper Body Dressing  Assistance Needed: Supervision or touching assistance  CARE Score: 4  Discharge Goal: Independent, Lower Body Dressing  Assistance Needed: Supervision or touching assistance  CARE Score: 4  Discharge Goal: Independent, Putting On/Taking Off Footwear  Assistance Needed: Substantial/maximal assistance  CARE Score: 2  Discharge Goal: Independent, Toilet Transfer  Assistance Needed: Supervision or touching assistance  CARE Score: 4  Discharge Goal: Independent  SP:Long-term Goals  Timeframe for Long-term Goals: 2-3 weeks  Goal 1: Pt will increase cognitive-linguistic skills to standby assist level in order to promote safety with the completion of ADLs upon discharge.   Goal 2: Pt will increase speech intelligibility to min assist level in order to effectively communicate wants and needs with caregivers upon discharge. Long-term Goals  Timeframe for Long-term Goals: 2-3 weeks  Goal 1: Pt will tolerate least restrictive diet consistency with no adverse outcomes. Additional Comments: Pt has been diagnosed with Parkinson's Disease. From a cognitive standpoint they will need:        24 hr supervision  --progress to occasional           Significant problems/ barriers to functional progress include: Pt is at a high risk for functional loss,    [x]  Acute infection/UTI    []  Low BP's     []  COPD flare-up   []  Uncontrolled blood sugar     []  Progressive anemia         []  Severe pain exacerbation     []  Impaired mental status    []  Urinary incontinence    []  Bowel incontinence           Plan to correct barriers to functional progress: Add scheduled rest breaks, control pain by using ice Lidoderm rest and massage as well as pain medications prior to therapy. Based on a comprehensive evaluation of the above, the individualized therapy and Discharge plan will be:    -Times stated are an average that will be varied based on the patient's daily need. PT ____2__hrs/day 5-7 days per week           OT ___1___hrs per day 5-7 days per week ST ____1/2___hrs /day 3-5 days per week       Estimated LOS 2 week(s)    - Overall functional prognosis:     [x]  Good    []  Fair    []  Poor -Medical Prognosis:   [x]  Good    []  Fair    []  Poor    This patient was made aware of the discussion of Plan of Care, their projected dicharge date and their projected function at discharge.        Ana Echevarria DO

## 2020-07-03 NOTE — CARE COORDINATION
bath on main level(handrail both sides for steps to second floor )  Home Access: Level entry  Bathroom Shower/Tub: Walk-in shower  Bathroom Toilet: Standard  Bathroom Equipment: Grab bars in shower, Hand-held shower(plans to buy shower chair)  Home Equipment: Rolling walker(rollator )  Receives Help From: Family  ADL Assistance: [address]: Independent  Homemaking Responsibilities: Yes  Ambulation Assistance: Independent(cane or rollator prn)  Transfer Assistance: Independent(occassional help with bed mobility on \"bad days\")  Active : No  Education: 2 years of college  Occupation: Retired  Type of occupation: [employer]  Leisure & Hobbies: antiDoppelganger, estate sales, sports fan  Additional Comments: Pt. reports he enjoys walking. Has 3 children who do not live locally       Pts personal preferences:     Pts assets/resources/support system: wife who is able to help take care of him    COVERAGE INFORMATION:Payor: MEDICARE / Plan: MEDICARE PART A AND B / Product Type: *No Product type* /       NURSING  Weight: 165 lb (74.8 kg) / Body mass index is 22.38 kg/m².     DIET GENERAL;    SpO2: 99 % (07/03/20 0655)  No active isolations    Skin Issues: Yes bruising   Pain Managed: Yes    Bladder continence: Yes    Bowel continence: Yes      Other: NATASHA for impulsive      PHYSICAL THERAPY  Bed mobility:  Supine to Sit: Minimal assistance (07/02/20 1301)  Sit to Supine: Minimal assistance (07/02/20 1301)  Transfers:  Sit to Stand: Contact guard assistance (07/03/20 0916)  Bed to Chair: Contact guard assistance (07/02/20 1328)  Gait:   Device: Rolling Walker (07/03/20 0914)  Assistance: Stand by assistance;Contact guard assistance (07/03/20 0914)  Distance: 150' (07/03/20 0914)  Quality of Gait: festinating gait, decreased luis heel strike and toe off, ff posture, steady pace (07/03/20 0914)  Comments: VC's for proper breating technique, some fatigue post ambulation (07/03/20 0914)  Stairs:     W/C mobility:     LTG:  Long term goal 1: Pt to complete all bed mobility with indep  Long term goal 2: Pt to complete all transfers with indep  Long term goal 3: Pt to ambulate 150ft with LRD and indep in protected/home environments, and supervision in the community  Long term goal 4: Pt to manage 4 steps with HR and indep; curb step with indep  PT Treatment Time:  1.5 hrs      OCCUPATIONAL THERAPY  Hand Dominance: Right  ADL  Feeding: Independent (07/02/20 1253)  Grooming: Supervision(pt stood sinkside to complete oral care, hand hygiene, and wash his face) (07/03/20 1135)  UE Bathing: Setup (07/02/20 1253)  LE Bathing: Minimal assistance (07/02/20 1253)  UE Dressing: Setup (07/02/20 1253)  LE Dressing: Minimal assistance (07/02/20 1253)  Toileting: Supervision (07/03/20 1135)  Additional Comments: Pt seated on commode upon OT arrival (07/03/20 1135)  Toilet Transfers  Toilet - Technique: Ambulating (07/03/20 1137)  Equipment Used: Grab bars (07/03/20 1137)  Toilet Transfer: Supervision (07/03/20 1137)     [address] - Transfer From: Kathie Gell (07/02/20 1133)  Shower - Transfer Type: To and From (07/02/20 1133)  Shower - Transfer To:  Shower seat with back (07/02/20 1133)  Shower - Technique: Ambulating (07/02/20 1133)  Shower Transfers: Contact Guard (07/02/20 1133)  Shower Transfers Comments: SBA to CGA, increased time, heavy use of grab bars (07/02/20 1133)  LTG:  Eating  Assistance Needed: Independent  CARE Score: 6  Discharge Goal: Independent, Oral Hygiene  Assistance Needed: Setup or clean-up assistance  CARE Score: 5  Discharge Goal: Independent, Toileting Hygiene  Assistance Needed: Supervision or touching assistance  CARE Score: 4  Discharge Goal: Independent, Shower/Bathe Self  Assistance Needed: Supervision or touching assistance  CARE Score: 4  Discharge Goal: Independent  Upper Body Dressing  Assistance Needed: Supervision or touching assistance  CARE Score: 4  Discharge Goal: Independent, Lower Recreation Therapy [facility] [] Support Group           Patient social interaction (mood, participation): good      Patient strengths: was independent prior to admission    Patients goal:  \"I want to get back to independence\"       Problems/Barriers: high falls risk         1. Safety:          - Intervention / Plan:    [x]  falls protocol     [x]  PT/OT    [x]  SP        - Results:         2. Potential DME needs:         - Intervention / Plan:  [x]  PT/OT     [x]  Assess equipment needs/access       - Results:         3. Weakness:          - Intervention / Plan:  [x]  PT/OT      []  Other:         - Results:         4. Discharge planning needs:          - Intervention / Plan:  [x]  Weekly team conference      [x]  family training        - Results:         5.            - Intervention / Plan:          - Results:         6.            - Intervention / Plan:         - Results:         7.            - Intervention / Plan:         - Results:           Discharge Plan   Estimated Length of Stay: TBD    Tentative Discharge date: 7/12/20      Anticipated Discharge Destination:  Home      Team recommendations:    1. Follow up Therapy :    PT  OT  SLP    2. Outpatient    Other:     Equipment needed at Discharge:  Other: TBD      Team Members Present at Conference:    Physician: Dr. Turner Seymour  : Kate Mg RN  RN: Eleazar Paz RN  Occupational [place] [id number]  Speech Therapist: JAMES Howard    Electronically signed by Bernarda Blas RN on 7/3/2020 at 12:53 PM

## 2020-07-04 PROCEDURE — 97110 THERAPEUTIC EXERCISES: CPT

## 2020-07-04 PROCEDURE — 97112 NEUROMUSCULAR REEDUCATION: CPT

## 2020-07-04 PROCEDURE — 6370000000 HC RX 637 (ALT 250 FOR IP): Performed by: PHYSICAL MEDICINE & REHABILITATION

## 2020-07-04 PROCEDURE — 97530 THERAPEUTIC ACTIVITIES: CPT

## 2020-07-04 PROCEDURE — 6370000000 HC RX 637 (ALT 250 FOR IP): Performed by: INTERNAL MEDICINE

## 2020-07-04 PROCEDURE — 97116 GAIT TRAINING THERAPY: CPT

## 2020-07-04 PROCEDURE — 97535 SELF CARE MNGMENT TRAINING: CPT

## 2020-07-04 PROCEDURE — 1180000000 HC REHAB R&B

## 2020-07-04 RX ORDER — MIDODRINE HYDROCHLORIDE 5 MG/1
5 TABLET ORAL
Qty: 90 TABLET | Refills: 3 | Status: SHIPPED | OUTPATIENT
Start: 2020-07-04 | End: 2020-07-12 | Stop reason: HOSPADM

## 2020-07-04 RX ADMIN — FERROUS SULFATE TAB 325 MG (65 MG ELEMENTAL FE) 325 MG: 325 (65 FE) TAB at 09:07

## 2020-07-04 RX ADMIN — Medication 100 MG: at 14:38

## 2020-07-04 RX ADMIN — MIDODRINE HYDROCHLORIDE 5 MG: 5 TABLET ORAL at 17:28

## 2020-07-04 RX ADMIN — MENTHOL AND METHYL SALICYLATE: 7.6; 29 OINTMENT TOPICAL at 21:18

## 2020-07-04 RX ADMIN — FOLIC ACID 1000 MCG: 1 TABLET ORAL at 09:07

## 2020-07-04 RX ADMIN — MENTHOL AND METHYL SALICYLATE: 7.6; 29 OINTMENT TOPICAL at 14:41

## 2020-07-04 RX ADMIN — NYSTATIN 500000 UNITS: 100000 SUSPENSION ORAL at 09:07

## 2020-07-04 RX ADMIN — BRIMONIDINE TARTRATE 1 DROP: 2 SOLUTION OPHTHALMIC at 09:08

## 2020-07-04 RX ADMIN — FERROUS SULFATE TAB 325 MG (65 MG ELEMENTAL FE) 325 MG: 325 (65 FE) TAB at 17:28

## 2020-07-04 RX ADMIN — BRIMONIDINE TARTRATE 1 DROP: 2 SOLUTION OPHTHALMIC at 21:17

## 2020-07-04 RX ADMIN — NYSTATIN 500000 UNITS: 100000 SUSPENSION ORAL at 21:17

## 2020-07-04 RX ADMIN — CARBIDOPA AND LEVODOPA 1 TABLET: 25; 100 TABLET ORAL at 21:18

## 2020-07-04 RX ADMIN — CYANOCOBALAMIN TAB 500 MCG 1000 MCG: 500 TAB at 09:07

## 2020-07-04 RX ADMIN — MIDODRINE HYDROCHLORIDE 5 MG: 5 TABLET ORAL at 09:06

## 2020-07-04 RX ADMIN — LATANOPROST 1 DROP: 50 SOLUTION OPHTHALMIC at 21:18

## 2020-07-04 RX ADMIN — MIDODRINE HYDROCHLORIDE 5 MG: 5 TABLET ORAL at 11:45

## 2020-07-04 RX ADMIN — VITAMIN D, TAB 1000IU (100/BT) 2000 UNITS: 25 TAB at 17:28

## 2020-07-04 RX ADMIN — CARBIDOPA AND LEVODOPA 1 TABLET: 25; 100 TABLET ORAL at 14:38

## 2020-07-04 RX ADMIN — CARBIDOPA AND LEVODOPA 1 TABLET: 25; 100 TABLET ORAL at 09:07

## 2020-07-04 RX ADMIN — Medication 100 MG: at 09:07

## 2020-07-04 ASSESSMENT — PAIN DESCRIPTION - ORIENTATION
ORIENTATION: RIGHT;LOWER
ORIENTATION: RIGHT;LOWER

## 2020-07-04 ASSESSMENT — PAIN DESCRIPTION - FREQUENCY
FREQUENCY: INTERMITTENT
FREQUENCY: CONTINUOUS

## 2020-07-04 ASSESSMENT — PAIN DESCRIPTION - DESCRIPTORS
DESCRIPTORS: CONSTANT
DESCRIPTORS: DULL;ACHING

## 2020-07-04 ASSESSMENT — PAIN SCALES - GENERAL
PAINLEVEL_OUTOF10: 0
PAINLEVEL_OUTOF10: 4
PAINLEVEL_OUTOF10: 2

## 2020-07-04 ASSESSMENT — PAIN DESCRIPTION - LOCATION
LOCATION: BACK
LOCATION: BACK

## 2020-07-04 ASSESSMENT — PAIN DESCRIPTION - PAIN TYPE: TYPE: ACUTE PAIN

## 2020-07-04 NOTE — FLOWSHEET NOTE
0730: Handoff complete. This RN assumed care of the pt. Pt has no stated needs at this time. 7652: pt took his pills whole with water. Pt is c/o lower back papin 4/10 but declined intervention. Pt is A&Ox4. Lungs are clear bilaterally. S1, S2, murmur heard. Bowel sounds are active X4. Pulses are palpable. No signs of edema. Pt has significant bruising on the right lower back. Pt is sitting in the chair comfortably.

## 2020-07-04 NOTE — PROGRESS NOTES
Subjective: The patient complains of severe  acute on chronic progressive weakness secondary to anemia partially relieved by blood transfusions, rest, PT, OT and exacerbated by current decline in H&H. I am concerned about patients low H&H and platelet count. ROS x10: The patient also complains of severely impaired mobility and activities of daily living. Otherwise no new problems with vision, hearing, nose, mouth, throat, dermal, cardiovascular, GI, , pulmonary, musculoskeletal, psychiatric or neurological. See Rehab H&P on Rehab chart dated . Vital signs:  BP (!) 90/54   Pulse 73   Temp 98 °F (36.7 °C) (Oral)   Resp 16   Ht 6' (1.829 m)   Wt 165 lb 8 oz (75.1 kg)   SpO2 97%   BMI 22.45 kg/m²   I/O:   PO/Intake:  fair PO intake, no problems observed or reported. Bowel/Bladder:  continent, no problems noted. General:  Patient is well developed, adequately nourished, non-obese and     well kempt. HEENT:    PERRLA, hearing intact to loud voice, external inspection of ear     and nose benign. Inspection of lips, tongue and gums benign  Musculoskeletal: No significant change in strength or tone. All joints stable. Inspection and palpation of digits and nails show no clubbing,       cyanosis or inflammatory conditions. Neuro/Psychiatric: Affect: flat but pleasant. Alert and oriented to person, place and     situation. No significant change in deep tendon reflexes or     Sensation-poor insight into his deficits  Lungs:  Diminished, CTA-B. Respiration effort is normal at rest.     Heart:   S1 = S2, RRR. No loud murmurs. Abdomen:  Soft, non-tender, no enlargement of liver or spleen. Extremities:  No significant lower extremity edema or tenderness. Skin:   Intact to general survey, no visualized or palpated problems.     Rehabilitation:  Physical therapy: FIMS:  Bed Mobility:      Transfers: Sit to Stand: Contact guard assistance  Stand to sit: Contact guard assistance  Bed to Chair: Contact guard assistance, Ambulation 1  Surface: level tile, ramp, uneven, carpet  Device: Rollator  Assistance: Supervision, Stand by assistance  Quality of Gait: VCs for slow reciprocal gait pattern with pt displaying good control with rollator on ramp with no change of pace with ascending or descending of ramp. Gait Deviations: Shuffles, Decreased step length, Decreased step height, Slow Renetta  Distance: 200' x 2   Comments: vc's for keeping rollator closer,      FIMS:  ,  , Assessment: Pt completed TUG test with use of SPC with best time of 19 sec. Pt with increased lateral sway noted with use of SPC. Pt continues to need high level of VCs for proper and safe use of Rollator with break managment and use of Rollator for seated rest breaks. Pt with frequent complaitns of dizziness with sit-stand tranfers edu. pt on standing and allowing dizziness to clear prior to attemtping to ambulation for safety. Occupational therapy: FIMS:   ,  , Assessment: Pt requires min A for safety awareness. Pt continues to benefit from OT services to maximize independence during ADLs and IADLs. Speech therapy: FIMS:        Lab/X-ray studies reviewed, analyzed and discussed with patient and staff:   No results found for this or any previous visit (from the past 24 hour(s)). Xr Lumbar Spine   6/29/2020    Nonspecific bowel gas pattern. Vascular calcifications noted abdominal aorta. Normal sagittal alignment lumbar spine. Possible fracture versus osteophyte anterior superior corner of L2. Moderate facet osteoarthropathy L3-S1. Moderate degenerative disc disease L5-S1. No Pars interarticularis fracture defect. Impression:  1. Possible chip fracture versus osteophyte anterior superior corner of L2, further evaluation with MRI of the lumbar spine recommended. 2. Moderate facet osteoarthropathy L3-S1 with moderate degenerative disc disease L5-S1. 3. Vascular calcifications abdominal aorta.      Ct Head   6/29/2020 This examination was performed on a CT scanner with dose reduction. Technique: Low-dose CT  acquisition technique included one of following options; 1 . Automated exposure control, 2. Adjustment of MA and or KV according to patient's size or 3. Use of iterative reconstruction. Vascular calcifications within the bilateral internal carotid artery cavernous segments and the vertebral arteries. . Moderate to moderately severe cerebral volume loss/atrophy. No subfalcine, transtentorial or tonsillar herniation or shift. No intraparenchymal hemorrhage. No extra-axial fluid collection or hematoma. Hypodensities bilateral centrum semiovale and periventricular regions. Empty sella. No Chiari malformation. Mucosal disease left maxillary sinus. No acute fracture or lytic or blastic bony lesion. Postoperative changes left orbit/optic globe. Airways and paranasal sinuses grossly unremarkable. 1. No CT evidence of acute intracranial abnormality, as questioned. 2. Moderate to moderately severe cerebral volume loss/atrophy with white matter changes consistent with sequelae small vessel ischemic disease. . 3. Vascular calcifications within the bilateral internal carotid artery cavernous segments and the vertebral arteries. . 4. Empty sella. Ct Chest   6/29/2020   1. NO ACUTE TRAUMATIC INJURY TO THE CHEST AS DESCRIBED IN BODY OF REPORT. 2. MEDIASTINAL STRUCTURES AND THORACIC AORTA INTACT. NO MEDIASTINAL HEMATOMA. 3. LUNGS APPEAR EXPANDED AND CLEAR WITHOUT A PULMONARY CONTUSION OR PNEUMOTHORAX. 4. DEGENERATIVE BONE SPURRING IN THE THORACIC SPINE AND NO THORACIC SPINE FRACTURE. 5. RIBS APPEAR INTACT AND NO CT EVIDENCE OF A DISPLACED RIB FRACTURE. Ct Abdomen Pelvis  : 6/29/2020   1. SOFT TISSUE CONTUSION INVOLVING THE RIGHT POSTEROLATERAL ABDOMINAL WALL. 2. 10 X 5 X 10 CM RIGHT POSTERIOR ABDOMINAL WALL HEMATOMA WITH L2 CHIP FRACTURE. 3. NO INTRAPERITONEAL OR RETROPERITONEAL HEMORRHAGE OR HEMATOMA.  4. NO ABDOMINAL ORGAN per second Mid internal carotid artery peak systolic velocity is 96.9 centimeters per second. Distal internal carotid artery peak systolic velocity is 10.7 centimeters per second Vertebral artery peak systolic velocity is 95.2 centimeters per second. Vertebral artery flow is antegrade ICA/CCA ratio is 0.78. Scattered areas of calcified and noncalcified plaque bilateral internal carotid arteries and bilateral carotid bulb/bifurcations. Peak systolic velocities, ICA/CCA ratios and antegrade vertebral artery flow as above. See above for details of the examination and below for internal carotid artery interpretation guidelines. 1. No areas of estimated luminal stenosis greater than 0-49 percent within the bilateral internal carotid arteries. . 2. Scattered areas of calcified and noncalcified plaque bilateral internal carotid arteries and bilateral carotid artery bulb/bifurcations. GOSINK CRITERIA Diameter          PSV t            EDV t          PSV          EDV          Stenosis Site       %              cm/sec          cm/sec      ICA/CCA    ICA/CCA 0-49                 <124              <40             <2:1           ---                 --- 50-69              125-225                  >2:1           ---                 --- 70-89               225-325          >100          >4:1           >5:1              --- 90%+                >325              >100          >4:1           >9:1           Damped resistive CCA >95%                 Previous extensive, complex labs, notes and diagnostics reviewed and analyzed. ALLERGIES:    Allergies as of 07/01/2020 - Review Complete 07/01/2020   Allergen Reaction Noted    Cat hair extract Itching and Swelling 11/06/2018      (please also verify by checking MAR)     Today I evaluated this patient for periodic reassessment of medical and functional status.   The patient was discussed in detail at the treatment team meeting focusing on current medical issues, progress in therapies, social issues, psychological issues, barriers to progress and strategies to address these barriers, and discharge planning. See the addendum to rehab progress note-as a second progress note in the chart. The patient continues to be high risk for future disability and their medical and rehabilitation prognosis continue to be good and therefore, we will continue the patient's rehabilitation course as planned. The patient's tentative discharge date was set. Patient and family education was discussed. The patient was made aware of the team discussion regarding their progress. Complex Physical Medicine & Rehab Issues Assess & Plan:   1. Severe abnormality of gait and mobility and impaired self-care and ADL's secondary to progressive severe Parkinson's disease. Functional and medical status reassessed regarding patients ability to participate in therapies and patient found to be able to participate in acute intensive comprehensive inpatient rehabilitation program including PT/OT to improve balance, ambulation, ADLs, and to improve the P/AROM. Therapeutic modifications regarding activities in therapies, place, amount of time per day and intensity of therapy made daily. In bed therapies or bedside therapies prn.   2. Bowel Parkinson's related constipation and Bladder dysfunction neurogenic bladder:  frequent toileting, ambulate to bathroom with assistance, check post void residuals. Check for C.difficile x1 if >2 loose stools in 24 hours, continue bowel & bladder program.  Monitor bowel and bladder function. Lactinex 2 PO every AC.   MOM prn, Brown Bomb prn, Glycerin suppository prn, enema prn.  3. Severe mid and low back parkinsonian stiffness related pain as well as generalized OA pain: reassess pain every shift and prior to and after each therapy session, give prn Tylenol and low-dose Norco avoiding any NSAIDs, modalities prn in therapy, Lidoderm, K-pad prn.   4. Skin healing bilateral upper extremity bruising and breakdown risk:  continue pressure relief program.  Daily skin exams and reports from nursing. 5. Severe fatigue due to nutritional and hydration deficiency: Add vitamin B12 vitamin D and CoQ10 continue to monitor I&Os, calorie counts prn, dietary consult prn.  6. Acute episodic insomnia with situational adjustment disorder:  prn Ambien, monitor for day time sedation. 7. Falls risk elevated:  patient to use call light to get nursing assistance to get up, bed and chair alarm. 8. Elevated DVT risk: progressive activities in PT, continue prophylaxis BRIAN hose, elevation and avoid all blood thinners due to recent anemia need for transfusion and myelodysplasia. 9. Complex discharge planning:  Weekly team meeting every Monday to assess progress towards goals, discuss and address social, psychological and medical comorbidities and to address difficulties they may be having progressing in therapy. Patient and family education is in progress. The patient is to follow-up with their family physician after discharge. Complex Active General Medical Issues that complicate care Assess & Plan:    1. Hypertension with   Orthostasis, Aortic valve stenosis, severe, LVH (left ventricular hypertrophy), Diastolic dysfunction-vital signs every shift dose and titrate cardiac medications to include ProAmatine consult hospitalist for backup medical-recheck CBC monitor closely for bleeding  2. Macular degeneration with   Blindness  right eye-usual cues to the right  3. BPH (benign prostatic hyperplasia)-check post void residuals and check recheck UA  4.   PD (Parkinson's disease) -monitor for orthostasis treat comorbidities including pain constipation rigid rigidity and cognitive decline consult neurology and titrate dopaminergic medications  5. MDS (myelodysplastic syndrome), low grade -add B12 recheck CBC add iron with food add Folvite transfuse as needed transfusion scheduled for 7/3/2020  6. Closed fracture of body of lumbar vertebra -add Lidoderm add high-dose vitamin D  7. 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    Primary orthostatic hypotension-check Ortho BPs titrate cardiac medications    Susan Grandehigino Rosenberg, D.O., PM&R     Attending    [address]

## 2020-07-04 NOTE — PROGRESS NOTES
Physical Therapy Rehab Treatment Note  Facility/Department: [facility]  Room: 70/B403-28       NAME: Michell Reeder  : 1940 (89 y.o.)  MRN: [id number]  CODE STATUS: Full Code    Date of Service: 2020  Chart Reviewed: Yes  Family / Caregiver Present: No    Restrictions:  Restrictions/Precautions: Fall Risk       SUBJECTIVE: Subjective: I was a little tired after this morning but that's a good thing. I feel good right now. Pain Screening  Patient Currently in Pain: Denies       Post Treatment Pain Screenin/10  Pain Assessment  Pain Assessment: 0-10  Pain Level: 2  Pain Location: Back  Pain Orientation: Right; Lower  Pain Descriptors: Dull;Aching  Pain Frequency: Intermittent    OBJECTIVE:               Neuromuscular Education  NDT Treatment: Gait   Neuromuscular Comments: Gait drills performed in Hallway using Handrails for fwd,retro and lateral walking to improved stride length and heel strike in order to improve safety with ambulation. [facility] drills with Rollator and with SPC to challenge pt's balance and change of directions. Transfers  Sit to Stand: Stand by assistance  Stand to sit: Stand by assistance  Car Transfer: Stand by assistance    Ambulation  Ambulation?: Yes  Ambulation 1  Surface: level tile;uneven;carpet  Device: Rollator  Assistance: Supervision;Stand by assistance  Quality of Gait: Improved heel strike and foot clerance with Fair+ carry over throughout this tx session. Distance: 400' x 2     Stairs/Curb  Stairs?: Yes  Stairs  # Steps : 8  Stairs Height: 6\"  Rails: Bilateral  Assistance: Stand by assistance;Contact guard assistance  Comment: Slow to complete, increased fatigue, rest break needed following completion of stairs.           Activity Tolerance  Activity Tolerance: Patient Tolerated treatment well    Exercises  Hamstring Sets: 2x10 YTB  Hip Flexion: x20  Hip Abduction: x20 YTB / x20 Ball squeezes   Knee Long Arc Quad: x20  Ankle Pumps: x20 ASSESSMENT/COMMENTS:  Body structures, Functions, Activity limitations: Decreased functional mobility ; Decreased strength;Decreased endurance;Decreased balance;Decreased coordination;Decreased safe awareness; Increased pain  Assessment: Positive attitued and approach towards therapy, good carry over with instructions for increased heels strike and foot clearance. Increased tolerance with therapy with pt required decreased seated rest breaks. PT Education: General Safety  Patient Education: Edu. pt to stand and make sure head is clear before attempting to ambulate due to pt's frequent reporting of dizziness upon standing.      PLAN OF CARE/Safety:   Safety Devices  Type of devices: Left in chair;Chair alarm in place      Therapy Time:   Individual   Time In 1300   Time Out 1400   Minutes 60     Minutes:60      Transfer/Bed mobility training:      Gait trainin      Neuro re education:25     Therapeutic ex:Lesvia Hemphill  20 at 3:26 PM

## 2020-07-04 NOTE — PROGRESS NOTES
Physical Therapy Rehab Treatment Note  [facility]       NAME: Indigo March  : 1940 ([de-identified] y.o.)  MRN: [id number]  CODE STATUS: Full Code    Date of Service: 2020  Chart Reviewed: Yes  Family / Caregiver Present: No    Restrictions:  Restrictions/Precautions: Fall Risk       SUBJECTIVE: Subjective: Dull ache in the right lwoer back right now just sitting in chair, gets to be buring stabbing twisting and when I roll in bed. Pain Screening  Patient Currently in Pain: Yes       Post Treatment Pain Screenin/10  Pain Assessment  Pain Assessment: 0-10  Pain Level: 2  Pain Location: Back  Pain Orientation: Right; Lower  Pain Descriptors: Dull;Aching  Pain Frequency: Intermittent    OBJECTIVE:                              Ambulation  Ambulation?: Yes  Ambulation 1  Surface: level tile;ramp;uneven;carpet  Device: Rollator  Assistance: Supervision;Stand by assistance  Quality of Gait: VCs for slow reciprocal gait pattern with pt displaying good control with rollator on ramp with no change of pace with ascending or descending of ramp. Distance: 200' x 2                    Exercises  Hip Flexion: x20  Knee Long Arc Quad: x20  Ankle Pumps: x20      ASSESSMENT/COMMENTS:  Body structures, Functions, Activity limitations: Decreased functional mobility ; Decreased strength;Decreased endurance;Decreased balance;Decreased coordination;Decreased safe awareness; Increased pain  Assessment: Pt completed TUG test with use of SPC with best time of 19 sec. Pt with increased lateral sway noted with use of SPC. Pt continues to need high level of VCs for proper and safe use of Rollator with break managment and use of Rollator for seated rest breaks. Pt with frequent complaitns of dizziness with sit-stand tranfers edu. pt on standing and allowing dizziness to clear prior to attemtping to ambulation for safety.    PT Education: General Safety  Patient Education: Edu. pt to stand and make sure head is clear before attempting to ambulate due to pt's frequent reporting of dizziness upon standing. PLAN OF CARE/Safety:   Safety Devices  Type of devices:  All fall risk precautions in place      Therapy Time:   Individual   Time In    Time Out 1030   Minutes 30     Minutes:30      Transfer/Bed mobility trainin      Gait training:15      Neuro re education:0     Therapeutic ex: Sam  20 at 12:45 PM

## 2020-07-04 NOTE — PROGRESS NOTES
Occupational Therapy  Facility/Department: [facility]  Daily Treatment Note  NAME: Jessica Mccarty  : 1940  MRN: [id number]    Date of Service: 2020    Discharge Recommendations:  Continue to assess pending progress     Assessment  Activity Tolerance  Activity Tolerance: Patient Tolerated treatment well  Safety Devices  Safety Devices in place: Yes  Type of devices: All fall risk precautions in place      Patient Diagnosis(es): There were no encounter diagnoses. has a past medical history of Actinic keratoses, Aortic valve stenosis, severe, BPH (benign prostatic hyperplasia), Cancer ([facility] [address] Colon polyp, Diastolic dysfunction, ED (erectile dysfunction), Glaucoma, Hemochromatosis, History of echocardiogram, History of echocardiogram, Hypertension, Iron deficiency anemia, LVH (left ventricular hypertrophy), Macular degeneration, Mass on back, Osteoarthritis cervical spine, and Parkinson disease ([facility] [address] has a past surgical history that includes Colonoscopy (2009); back surgery; Cataract removal; eye surgery (Bilateral); Tonsillectomy; other surgical history (06/08/15); and Cardiac catheterization (2017).     Restrictions  Restrictions/Precautions  Restrictions/Precautions: Fall Risk     Subjective  General  Chart Reviewed: Yes  Patient assessed for rehabilitation services?: Yes  Response to previous treatment: Patient with no complaints from previous session  Family / Caregiver Present: No  Referring Practitioner: Dr. Yazmin Tapia  Diagnosis: Impaired mobility and ADLs 2° to severe parkinson's disease with blunt chest trauma  Pre Treatment Pain Screening  Pain at present: 6  Scale Used: Numeric Score  Intervention List: Patient able to continue with treatment;Patient declined any intervention  Comments / Details: Pt reports pain in his lower back   Vital Signs  Patient Currently in Pain: Denies     Objective  ADL  Grooming: Supervision(To perform hand hygiene while standing at sink)  Toileting: Supervision(Pt sat to urinate. Pt able to complete hygiene and pants mgmt)    Toilet Transfers  Toilet - Technique: Stand pivot  Equipment Used: Grab bars  Toilet Transfer: Supervision  Toilet Transfers Comments: Pt transferred from [facility] to toilet then transferred off toilet to ambulating without a device to sink     Coordination  Fine Motor:     Theraputty: Green putty (medium resistance), squeezing, rolling, pinching, folding, inserting and removing beads. Pt successfully inserted/removed 15 of 15 beads with Min difficulty. To improve hand strength/fine motor coordination for mgmt of clothing fasteners/ADL containers in a timely manner. Fine Motor: Small Nuts and Bolts Pt used bilateral hands to don a wooden washer onto a small bolt then don a nut on the bolt to hold the washer in place (x10). Pt had Min difficulty with activity and worked at a steady pace without rest breaks . To improve hand fine motor coordination for mgmt of clothing fasteners/ADL containers in a timely manner.         Plan   Plan  Times per week: 5-7x  Plan weeks: 1.5 weeks  Current Treatment Recommendations: Strengthening, Functional Mobility Training, Patient/Caregiver Education & Training, Home Management Training, Cognitive/Perceptual Training, Pain Management, Balance Training, Neuromuscular Re-education, Self-Care / ADL, Safety Education & Training, Endurance Training  Plan Comment: Continue per OT POC    Goals  Patient Goals   Patient goals : \"I want to get back to independence\"       Therapy Time   Individual Concurrent Group Co-treatment   Time In 1530         Time Out 1600         Minutes 30         ADL trainin minutes  Therapeutic activities: 22 minutes    Electronically signed by TAYLOR Quinn on 2020 at 4:02 PM  TAYLOR Quinn

## 2020-07-04 NOTE — CONSULTS
Physician Progress Note    7/4/2020   12:25 PM    Name:  David Garcia  MRN:    [id number]      Day: 3     Admit Date: 7/1/2020  6:41 PM  PCP: Moshe Jovel MD    Code Status:  Full Code    Subjective: Intermittent LH with standing. Intermittent hip pain when working with therapy. Otherwise doing well.      Current Facility-Administered Medications   Medication Dose Route Frequency Provider Last Rate Last Dose    lidocaine 4 % external patch 3 patch  3 patch Transdermal Daily Nohemy Scullin, DO   3 patch at 07/03/20 0934    analgesic ointment ointment   Topical TID Nohemy Scullin, DO        HYDROcodone-acetaminophen (NORCO) 5-325 MG per tablet 1 tablet  1 tablet Oral Q4H PRN Nohemy Scullin, DO        Vitamin D (CHOLECALCIFEROL) tablet 2,000 Units  2,000 Units Oral Dinner Nohemy Scullin, DO   2,000 Units at 07/03/20 1710    cyanocobalamin injection 1,000 mcg  1,000 mcg Intramuscular Weekly Nohemy Scullin, DO   1,000 mcg at 07/02/20 0815    coenzyme Q10 capsule 100 mg  100 mg Oral BID AC Nohemy Scullin, DO   100 mg at 07/04/20 2757    bisacodyl (DULCOLAX) EC tablet 5 mg  5 mg Oral Daily PRN Nohemy Scullin, DO        ferrous sulfate (IRON 325) tablet 325 mg  325 mg Oral BID WC Nohemy Scullin, DO   325 mg at 07/04/20 0907    fleet rectal enema 1 enema  1 enema Rectal Daily PRN Nohemy Scullin, DO        acetaminophen (TYLENOL) tablet 650 mg  650 mg Oral Q6H PRN Teagan Spruce, DO        Or    acetaminophen (TYLENOL) suppository 650 mg  650 mg Rectal Q6H PRN Teagan Spruce, DO        polyethylene glycol (GLYCOLAX) packet 17 g  17 g Oral Daily PRN Teagan Spruce, DO        promethazine (PHENERGAN) tablet 12.5 mg  12.5 mg Oral Q6H PRN Teagan Spruce, DO        Or    [facility] injection 4 mg  4 mg Intravenous Q6H PRN Teagan Spruce, DO        sodium chloride flush 0.9 % injection 10 mL  10 mL Intravenous 2 times per day Teagan Spruce, DO   10 mL at 07/03/20 9238    sodium chloride flush 0.9 % injection 10 mL  10 mL Intravenous PRN Heidi Hasting, DO        brimonidine (ALPHAGAN) 0.2 % ophthalmic solution 1 drop  1 drop Both Eyes BID Heidi Hasting, DO   1 drop at 20 0908    carbidopa-levodopa (SINEMET)  MG per tablet 1 tablet  1 tablet Oral TID Heidi Hasting, DO   1 tablet at  4656    folic acid (FOLVITE) tablet 1,000 mcg  1,000 mcg Oral Daily Heidi Hasting, DO   1,000 mcg at 20 0907    latanoprost (XALATAN) 0.005 % ophthalmic solution 1 drop  1 drop Both Eyes Nightly Heidi Hasting, DO   1 drop at 20 2155    midodrine (PROAMATINE) tablet 5 mg  5 mg Oral TID WC Heidi Hasting, DO   5 mg at 20 1145    vitamin B-12 (CYANOCOBALAMIN) tablet 1,000 mcg  1,000 mcg Oral Daily Heidi Hasting, DO   1,000 mcg at 20 0907    nystatin (MYCOSTATIN) 829661 UNIT/ML suspension 500,000 Units  5 mL Oral TID Fabiene Pile, DO   500,000 Units at 20 0907       Physical Examination:      Vitals:  BP (!) 90/54   Pulse 73   Temp 98 °F (36.7 °C) (Oral)   Resp 16   Ht 6' (1.829 m)   Wt 165 lb 8 oz (75.1 kg)   SpO2 97%   BMI 22.45 kg/m²   Temp (24hrs), Av °F (36.7 °C), Min:98 °F (36.7 °C), Max:98 °F (36.7 °C)    General appearance: alert, cooperative and no distress  Mental Status: oriented to person, place and time and normal affect  Lungs: clear to auscultation bilaterally, normal effort  Heart: regular rate and rhythm, no murmur  Abdomen: soft, nontender, nondistended, bowel sounds present, no masses  Extremities: no edema, redness, tenderness in the calves  Skin: large ecchymoses by R hip. Tender    Data:     Labs:  Recent Labs     20  2328 20  0514   WBC 5.3 4.7*   HGB 8.3* 8.4*   * 123*     No results for input(s): NA, K, CL, CO2, BUN, CREATININE, GLUCOSE in the last 72 hours. No results for input(s): AST, ALT, ALB, BILITOT, ALKPHOS in the last 72 hours.     Assessment and Plan:        79-year-old male with Parkinson's disease, hypertension, calcified aortic valve presents after syncopal episode at home which occurred again while he was in wheelchair in the emergency department. Sung Shelby was found to have a 10 cm posterior abdominal wall hematoma and L2 chip fracture.  He was admitted for evaluation of syncope which was found to be due to orthostatic hypotension in the setting of autonomic dysfunction from Parkinson's. He has been transferred to Acute Rehab. 1.  Orthostatic hypotension in the setting of autonomic dysfunction from Parkinson's. - on midodrine. Consider northera at d/c    2. Acute blood loss anemia due to Abdominal wall hematoma:   - Hb now stable    3.  L2 chip fracture: Supportive care.  PT/OT    4.  Parkinson's disease: Continue home Sinemet     SCDs.   Holding chemical prophylaxis due to hematoma  Full code      Electronically signed by Rachael Longo DO on 7/4/2020 at 12:25 PM

## 2020-07-04 NOTE — FLOWSHEET NOTE
Patient assessment complete. He is confused and impulsive. He has an NATASHA for safety that has called several times thru the night as the patient does not use his call light and gets up on his own. He has been continent of bowel and bladder. And denies constipation as his last bowel movement was 7/3/2020. It was a large brown stool.

## 2020-07-04 NOTE — PROGRESS NOTES
Occupational Therapy  Facility/Department: [facility]  Daily Treatment Note  NAME: Ryan Hernandez  : 1940  MRN: [id number]    Date of Service: 2020    Discharge Recommendations:  Continue to assess pending progress       Assessment   Assessment: Pt requires min A for safety awareness. Pt continues to benefit from OT services to maximize independence during ADLs and IADLs. REQUIRES OT FOLLOW UP: Yes  Activity Tolerance  Activity Tolerance: Patient Tolerated treatment well  Safety Devices  Safety Devices in place: Yes  Type of devices: All fall risk precautions in place  Restraints  Initially in place: No         Patient Diagnosis(es): There were no encounter diagnoses. has a past medical history of Actinic keratoses, Aortic valve stenosis, severe, BPH (benign prostatic hyperplasia), Cancer ([address] Colon polyp, Diastolic dysfunction, ED (erectile dysfunction), Glaucoma, Hemochromatosis, History of echocardiogram, History of echocardiogram, Hypertension, Iron deficiency anemia, LVH (left ventricular hypertrophy), Macular degeneration, Mass on back, Osteoarthritis cervical spine, and Parkinson disease ([address] has a past surgical history that includes Colonoscopy (2009); back surgery; Cataract removal; eye surgery (Bilateral); Tonsillectomy; other surgical history (06/08/15); and Cardiac catheterization (2017).     Restrictions  Restrictions/Precautions  Restrictions/Precautions: Fall Risk  Subjective   General  Chart Reviewed: Yes  Patient assessed for rehabilitation services?: Yes  Family / Caregiver Present: No  Referring Practitioner: Dr. Stephani Mendieta  Diagnosis: Impaired mobility and ADLs 2° to severe parkinson's disease with blunt chest trauma  Pain Assessment  Pain Assessment: 0-10  Pain Level: 0  Pre Treatment Pain Screening  Pain at present: 0  Scale Used: Numeric Score  Intervention List: Patient able to continue with treatment;Patient declined any intervention  Vital Signs  Patient Currently in Pain: Denies   Orientation  Orientation  Overall Orientation Status: Within Functional Limits  Objective    ADL  Feeding: Independent  Grooming: Supervision(pt stood to complete all grooming tasks this AM with no LOB. Pt required multiple seated rest breaks d/t dizziness )  UE Bathing: Setup  LE Bathing: Contact guard assistance(pt attempted to wash feet while standing and therapist suggested pt sit on shower chair to complete for safety )  UE Dressing: Setup  LE Dressing: Minimal assistance(to get the R foot into brief and tie shoes)  Toileting: Supervision(pt stood to urinate)        Balance  Sitting Balance: Stand by assistance  Standing Balance: Supervision  Functional Mobility  Functional - Mobility Device: Cane  Activity: To/from bathroom  Assist Level: Supervision  Toilet Transfers  Toilet - Technique: Ambulating  Equipment Used: Grab bars  Toilet Transfer: Supervision  Toilet Transfers Comments: pt stood to complete  Shower Transfers  Shower - Transfer From: Cane  Shower - Transfer Type: To and From  Shower - Transfer To: Shower seat with back  Shower - Technique: Ambulating  Shower Transfers: Supervision  Bed mobility  Supine to Sit: Stand by assistance  Sit to Supine: Unable to assess  Transfers  Sit to stand: Supervision  Stand to sit: Supervision        Cognition  Cognition Comment: Comprehension:  Mod I, Expression: Independent, Social interaction: Independent, Problem Solving: Min A, Memory- Min A        Plan   Plan  Times per week: 5-7x  Plan weeks: 1.5 weeks  Current Treatment Recommendations: Strengthening, Functional Mobility Training, Patient/Caregiver Education & Training, Home Management Training, Cognitive/Perceptual Training, Pain Management, Balance Training, Neuromuscular Re-education, Self-Care / ADL, Safety Education & Training, Endurance Training  Plan Comment: Continue per OT POC  G-Code     OutComes Score                                                  AM-PAC Score Goals  Patient Goals   Patient goals : \"I want to get back to independence\"       Therapy Time   Individual Concurrent Group Co-treatment   Time In 0800         Time Out 0900         Minutes 60           ADL trainin minutes     Mayank Benitez OT   Electronically signed by Mayank Benitez OT on 2020 at 10:15 AM

## 2020-07-05 LAB
ANISOCYTOSIS: ABNORMAL
BANDED NEUTROPHILS RELATIVE PERCENT: 2 %
BASOPHILS ABSOLUTE: 0 K/UL (ref 0–0.2)
BASOPHILS RELATIVE PERCENT: 1 %
BILIRUBIN URINE: NEGATIVE
BLOOD CULTURE, ROUTINE: NORMAL
BLOOD, URINE: NEGATIVE
CLARITY: CLEAR
COLOR: YELLOW
EOSINOPHILS ABSOLUTE: 0.2 K/UL (ref 0–0.7)
EOSINOPHILS RELATIVE PERCENT: 4 %
GLUCOSE URINE: NEGATIVE MG/DL
HCT VFR BLD CALC: 23.3 % (ref 42–52)
HEMATOLOGY PATH CONSULT: NO
HEMOGLOBIN: 7.9 G/DL (ref 14–18)
HYPOCHROMIA: ABNORMAL
KETONES, URINE: NEGATIVE MG/DL
LEUKOCYTE ESTERASE, URINE: NEGATIVE
LYMPHOCYTES ABSOLUTE: 0.7 K/UL (ref 1–4.8)
LYMPHOCYTES RELATIVE PERCENT: 15 %
MACROCYTES: ABNORMAL
MCH RBC QN AUTO: 35.5 PG (ref 27–31.3)
MCHC RBC AUTO-ENTMCNC: 34 % (ref 33–37)
MCV RBC AUTO: 104.5 FL (ref 80–100)
METAMYELOCYTES RELATIVE PERCENT: 2 %
MONOCYTES ABSOLUTE: 0.6 K/UL (ref 0.2–0.8)
MONOCYTES RELATIVE PERCENT: 12.6 %
MYELOCYTE PERCENT: 1 %
NEUTROPHILS ABSOLUTE: 3.3 K/UL (ref 1.4–6.5)
NEUTROPHILS RELATIVE PERCENT: 63 %
NITRITE, URINE: NEGATIVE
PDW BLD-RTO: 21.3 % (ref 11.5–14.5)
PH UA: 6 (ref 5–9)
PLASMA CELLS PERCENT: 0 %
PLATELET # BLD: 166 K/UL (ref 130–400)
PLATELET SLIDE REVIEW: ADEQUATE
POLYCHROMASIA: ABNORMAL
PROMYELOCYTES PERCENT: 1 %
PROTEIN UA: NEGATIVE MG/DL
RBC # BLD: 2.23 M/UL (ref 4.7–6.1)
SMUDGE CELLS: 1
SPECIFIC GRAVITY UA: 1 (ref 1–1.03)
UROBILINOGEN, URINE: 0.2 E.U./DL
WBC # BLD: 4.8 K/UL (ref 4.8–10.8)

## 2020-07-05 PROCEDURE — 97110 THERAPEUTIC EXERCISES: CPT

## 2020-07-05 PROCEDURE — 6370000000 HC RX 637 (ALT 250 FOR IP): Performed by: PHYSICAL MEDICINE & REHABILITATION

## 2020-07-05 PROCEDURE — 87186 SC STD MICRODIL/AGAR DIL: CPT

## 2020-07-05 PROCEDURE — 87077 CULTURE AEROBIC IDENTIFY: CPT

## 2020-07-05 PROCEDURE — 97116 GAIT TRAINING THERAPY: CPT

## 2020-07-05 PROCEDURE — 6370000000 HC RX 637 (ALT 250 FOR IP): Performed by: INTERNAL MEDICINE

## 2020-07-05 PROCEDURE — 87086 URINE CULTURE/COLONY COUNT: CPT

## 2020-07-05 PROCEDURE — 81003 URINALYSIS AUTO W/O SCOPE: CPT

## 2020-07-05 PROCEDURE — 1180000000 HC REHAB R&B

## 2020-07-05 PROCEDURE — 36415 COLL VENOUS BLD VENIPUNCTURE: CPT

## 2020-07-05 PROCEDURE — 85025 COMPLETE CBC W/AUTO DIFF WBC: CPT

## 2020-07-05 RX ADMIN — CARBIDOPA AND LEVODOPA 1 TABLET: 25; 100 TABLET ORAL at 13:22

## 2020-07-05 RX ADMIN — MIDODRINE HYDROCHLORIDE 5 MG: 5 TABLET ORAL at 11:50

## 2020-07-05 RX ADMIN — MIDODRINE HYDROCHLORIDE 5 MG: 5 TABLET ORAL at 08:27

## 2020-07-05 RX ADMIN — CARBIDOPA AND LEVODOPA 1 TABLET: 25; 100 TABLET ORAL at 21:07

## 2020-07-05 RX ADMIN — FERROUS SULFATE TAB 325 MG (65 MG ELEMENTAL FE) 325 MG: 325 (65 FE) TAB at 16:14

## 2020-07-05 RX ADMIN — FERROUS SULFATE TAB 325 MG (65 MG ELEMENTAL FE) 325 MG: 325 (65 FE) TAB at 08:27

## 2020-07-05 RX ADMIN — Medication 100 MG: at 06:08

## 2020-07-05 RX ADMIN — NYSTATIN 500000 UNITS: 100000 SUSPENSION ORAL at 21:07

## 2020-07-05 RX ADMIN — VITAMIN D, TAB 1000IU (100/BT) 2000 UNITS: 25 TAB at 16:14

## 2020-07-05 RX ADMIN — FOLIC ACID 1000 MCG: 1 TABLET ORAL at 08:27

## 2020-07-05 RX ADMIN — MIDODRINE HYDROCHLORIDE 5 MG: 5 TABLET ORAL at 16:14

## 2020-07-05 RX ADMIN — MENTHOL AND METHYL SALICYLATE: 7.6; 29 OINTMENT TOPICAL at 13:23

## 2020-07-05 RX ADMIN — MENTHOL AND METHYL SALICYLATE: 7.6; 29 OINTMENT TOPICAL at 21:07

## 2020-07-05 RX ADMIN — NYSTATIN 500000 UNITS: 100000 SUSPENSION ORAL at 08:27

## 2020-07-05 RX ADMIN — CYANOCOBALAMIN TAB 500 MCG 1000 MCG: 500 TAB at 08:27

## 2020-07-05 RX ADMIN — CARBIDOPA AND LEVODOPA 1 TABLET: 25; 100 TABLET ORAL at 08:27

## 2020-07-05 RX ADMIN — LATANOPROST 1 DROP: 50 SOLUTION OPHTHALMIC at 21:14

## 2020-07-05 RX ADMIN — MENTHOL AND METHYL SALICYLATE: 7.6; 29 OINTMENT TOPICAL at 08:29

## 2020-07-05 RX ADMIN — Medication 100 MG: at 15:32

## 2020-07-05 RX ADMIN — BRIMONIDINE TARTRATE 1 DROP: 2 SOLUTION OPHTHALMIC at 21:14

## 2020-07-05 RX ADMIN — NYSTATIN 500000 UNITS: 100000 SUSPENSION ORAL at 13:22

## 2020-07-05 RX ADMIN — BRIMONIDINE TARTRATE 1 DROP: 2 SOLUTION OPHTHALMIC at 08:30

## 2020-07-05 ASSESSMENT — PAIN SCALES - GENERAL: PAINLEVEL_OUTOF10: 0

## 2020-07-05 NOTE — PROGRESS NOTES
Subjective: The patient complains of severe  acute on chronic progressive weakness secondary to anemia partially relieved by blood transfusions, rest, PT, OT and exacerbated by current decline in H&H. I am concerned about patients low H&H and platelet count. ROS x10: The patient also complains of severely impaired mobility and activities of daily living. Otherwise no new problems with vision, hearing, nose, mouth, throat, dermal, cardiovascular, GI, , pulmonary, musculoskeletal, psychiatric or neurological. See Rehab H&P on Rehab chart dated . Vital signs:  /76   Pulse 72   Temp 98 °F (36.7 °C) (Oral)   Resp 20   Ht 6' (1.829 m)   Wt 165 lb 8 oz (75.1 kg)   SpO2 98%   BMI 22.45 kg/m²   I/O:   PO/Intake:  fair PO intake, no problems observed or reported. Bowel/Bladder:  continent, no problems noted. General:  Patient is well developed, adequately nourished, non-obese and     well kempt. HEENT:    PERRLA, hearing intact to loud voice, external inspection of ear     and nose benign. Inspection of lips, tongue and gums benign  Musculoskeletal: No significant change in strength or tone. All joints stable. Inspection and palpation of digits and nails show no clubbing,       cyanosis or inflammatory conditions. Neuro/Psychiatric: Affect: flat but pleasant. Alert and oriented to person, place and     situation. No significant change in deep tendon reflexes or     Sensation-poor insight into his deficits  Lungs:  Diminished, CTA-B. Respiration effort is normal at rest.     Heart:   S1 = S2, RRR. No loud murmurs. Abdomen:  Soft, non-tender, no enlargement of liver or spleen. Extremities:  No significant lower extremity edema or tenderness. Skin:   Intact to general survey, no visualized or palpated problems.     Rehabilitation:  Physical therapy: FIMS:  Bed Mobility:      Transfers: Sit to Stand: Stand by assistance  Stand to sit: Stand by assistance  Bed to Chair: Contact guard assistance, Ambulation 1  Surface: level tile  Device: Single point cane  Assistance: Stand by assistance, Contact guard assistance, Minimal assistance  Quality of Gait: ataxia, decrease heel strike, pathway devaition  Gait Deviations: Shuffles, Decreased step length, Decreased step height, Slow Renetta  Distance: 275  Comments: vc's for keeping rollator closer, Stairs  # Steps : 8  Stairs Height: 6\"  Rails: Bilateral  Assistance: Stand by assistance, Contact guard assistance  Comment: Slow to complete, increased fatigue, rest break needed following completion of stairs. FIMS:  ,  , Assessment: Pt pleasant and cooperative with all aspects of therapy. Pt required instruction for improving gait quality. Educated on importance of upright posture, and stretches as tolerated. Overall moiz treatment well. Occupational therapy: FIMS:   ,  , Assessment: Pt requires min A for safety awareness. Pt continues to benefit from OT services to maximize independence during ADLs and IADLs.     Speech therapy: FIMS:        Lab/X-ray studies reviewed, analyzed and discussed with patient and staff:   Recent Results (from the past 24 hour(s))   CBC Auto Differential    Collection Time: 07/05/20  6:05 AM   Result Value Ref Range    WBC 4.8 4.8 - 10.8 K/uL    RBC 2.23 (L) 4.70 - 6.10 M/uL    Hemoglobin 7.9 (L) 14.0 - 18.0 g/dL    Hematocrit 23.3 (L) 42.0 - 52.0 %    .5 (H) 80.0 - 100.0 fL    MCH 35.5 (H) 27.0 - 31.3 pg    MCHC 34.0 33.0 - 37.0 %    RDW 21.3 (H) 11.5 - 14.5 %    Platelets 544 051 - 888 K/uL    PLATELET SLIDE REVIEW Adequate     Path Consult No     Neutrophils % 63.0 %    Lymphocytes % 15.0 %    Monocytes % 12.6 %    Eosinophils % 4.0 %    Basophils % 1.0 %    Neutrophils Absolute 3.3 1.4 - 6.5 K/uL    Lymphocytes Absolute 0.7 (L) 1.0 - 4.8 K/uL    Monocytes Absolute 0.6 0.2 - 0.8 K/uL    Eosinophils Absolute 0.2 0.0 - 0.7 K/uL    Basophils Absolute 0.0 0.0 - 0.2 K/uL    Bands Relative 2 % Metamyelocytes Relative 2 %    Myelocyte Percent 1 %    Promyelocytes Percent 1 (A) %    Plasma Cells Relative 0 %    Smudge Cells 1.0     Anisocytosis 1+     Macrocytes 1+     Polychromasia 1+     Hypochromia 1+        Xr Lumbar Spine   6/29/2020    Nonspecific bowel gas pattern. Vascular calcifications noted abdominal aorta. Normal sagittal alignment lumbar spine. Possible fracture versus osteophyte anterior superior corner of L2. Moderate facet osteoarthropathy L3-S1. Moderate degenerative disc disease L5-S1. No Pars interarticularis fracture defect. Impression:  1. Possible chip fracture versus osteophyte anterior superior corner of L2, further evaluation with MRI of the lumbar spine recommended. 2. Moderate facet osteoarthropathy L3-S1 with moderate degenerative disc disease L5-S1. 3. Vascular calcifications abdominal aorta. Ct Head   6/29/2020    This examination was performed on a CT scanner with dose reduction. Technique: Low-dose CT  acquisition technique included one of following options; 1 . Automated exposure control, 2. Adjustment of MA and or KV according to patient's size or 3. Use of iterative reconstruction. Vascular calcifications within the bilateral internal carotid artery cavernous segments and the vertebral arteries. . Moderate to moderately severe cerebral volume loss/atrophy. No subfalcine, transtentorial or tonsillar herniation or shift. No intraparenchymal hemorrhage. No extra-axial fluid collection or hematoma. Hypodensities bilateral centrum semiovale and periventricular regions. Empty sella. No Chiari malformation. Mucosal disease left maxillary sinus. No acute fracture or lytic or blastic bony lesion. Postoperative changes left orbit/optic globe. Airways and paranasal sinuses grossly unremarkable. 1. No CT evidence of acute intracranial abnormality, as questioned.  2. Moderate to moderately severe cerebral volume loss/atrophy with white matter changes consistent with sequelae small vessel ischemic disease. . 3. Vascular calcifications within the bilateral internal carotid artery cavernous segments and the vertebral arteries. . 4. Empty sella. Ct Chest   2020   1. NO ACUTE TRAUMATIC INJURY TO THE CHEST AS DESCRIBED IN BODY OF REPORT. 2. MEDIASTINAL STRUCTURES AND THORACIC AORTA INTACT. NO MEDIASTINAL HEMATOMA. 3. LUNGS APPEAR EXPANDED AND CLEAR WITHOUT A PULMONARY CONTUSION OR PNEUMOTHORAX. 4. DEGENERATIVE BONE SPURRING IN THE THORACIC SPINE AND NO THORACIC SPINE FRACTURE. 5. RIBS APPEAR INTACT AND NO CT EVIDENCE OF A DISPLACED RIB FRACTURE. Ct Abdomen Pelvis  : 2020   1. SOFT TISSUE CONTUSION INVOLVING THE RIGHT POSTEROLATERAL ABDOMINAL WALL. 2. 10 X 5 X 10 CM RIGHT POSTERIOR ABDOMINAL WALL HEMATOMA WITH L2 CHIP FRACTURE. 3. NO INTRAPERITONEAL OR RETROPERITONEAL HEMORRHAGE OR HEMATOMA. 4. NO ABDOMINAL ORGAN TRAUMATIC INJURY OR ABDOMINAL ORGAN LACERATION. 5. DEGENERATIVE BONE CHANGES IN THE LUMBAR SPINE AND NO LUMBAR SPINE FRACTURE. 6. NO PELVIC FRACTURE AND ADDITIONAL INCIDENTAL FINDINGS IN BODY OF REPORT. Xr Chest   2020    Lungs and pleura:  No consolidation. No pleural effusion. No pneumothorax. Normal pulmonary vascular pattern. Cardiomediastinal silhouette:  Stable cardiomediastinal silhouette. Other:  No acute osseous findings. Remote fracture deformity left proximal humerus. No acute radiographic abnormality. Us Carotid Artery   2020   Patient MRN:  [id number] : 1940 Age: [de-identified] years Gender: Male Order Date:  2020 2:00 PM EXAM: US CAROTID ARTERY BILATERAL NUMBER OF IMAGES:  54 INDICATION:  syncope, vertigo  COMPARISON: Carotid artery ultrasound 2017 FINDINGS: Right side: Proximal common carotid artery peak systolic velocity is 936 centimeters per second Mid, common carotid artery peak systolic velocity is 104 centimeters per second.  Distal common carotid artery peak systolic velocity is 15.9 centimeters per second External carotid artery peak systolic velocity is 74.1 centimeters per second. Proximal internal carotid artery peak systolic velocity is 65.4 centimeters per second Mid internal carotid artery peak systolic velocity is 62.7 centimeters per second. Distal internal carotid artery peak systolic velocity is 28.0 centimeters per second Vertebral artery peak systolic velocity is 67.1 centimeters per second. Vertebral artery flow is antegrade ICA/CCA ratio is 0.85 Left side: Proximal common carotid artery peak systolic velocity is 584 centimeters per second Mid, common carotid artery peak systolic velocity is 848 centimeters per second. Distal common carotid artery peak systolic velocity is 14.1 centimeters per second. External carotid artery peak systolic velocity is 31.6 centimeters per second. Proximal internal carotid artery peak systolic velocity is 02.9 centimeters per second Mid internal carotid artery peak systolic velocity is 26.2 centimeters per second. Distal internal carotid artery peak systolic velocity is 43.5 centimeters per second Vertebral artery peak systolic velocity is 62.6 centimeters per second. Vertebral artery flow is antegrade ICA/CCA ratio is 0.78. Scattered areas of calcified and noncalcified plaque bilateral internal carotid arteries and bilateral carotid bulb/bifurcations. Peak systolic velocities, ICA/CCA ratios and antegrade vertebral artery flow as above. See above for details of the examination and below for internal carotid artery interpretation guidelines. 1. No areas of estimated luminal stenosis greater than 0-49 percent within the bilateral internal carotid arteries. . 2. Scattered areas of calcified and noncalcified plaque bilateral internal carotid arteries and bilateral carotid artery bulb/bifurcations.  GOSINK CRITERIA Diameter          PSV t            EDV t          PSV          EDV          Stenosis Site       %              cm/sec          cm/sec      ICA/CCA    ICA/CCA 0-49 <124              <40             <2:1           ---                 --- 50-69              125-225                  >2:1           ---                 --- 70-89               225-325          >100          >4:1           >5:1              --- 90%+                >325              >100          >4:1           >9:1           Damped resistive CCA >95%                 Previous extensive, complex labs, notes and diagnostics reviewed and analyzed. ALLERGIES:    Allergies as of 07/01/2020 - Review Complete 07/01/2020   Allergen Reaction Noted    Cat hair extract Itching and Swelling 11/06/2018      (please also verify by checking MAR)     Today I evaluated this patient for periodic reassessment of medical and functional status. The patient was discussed in detail at the treatment team meeting focusing on current medical issues, progress in therapies, social issues, psychological issues, barriers to progress and strategies to address these barriers, and discharge planning. See the addendum to rehab progress note-as a second progress note in the chart. The patient continues to be high risk for future disability and their medical and rehabilitation prognosis continue to be good and therefore, we will continue the patient's rehabilitation course as planned. The patient's tentative discharge date was set. Patient and family education was discussed. The patient was made aware of the team discussion regarding their progress. Complex Physical Medicine & Rehab Issues Assess & Plan:   1. Severe abnormality of gait and mobility and impaired self-care and ADL's secondary to progressive severe Parkinson's disease. Functional and medical status reassessed regarding patients ability to participate in therapies and patient found to be able to participate in acute intensive comprehensive inpatient rehabilitation program including PT/OT to improve balance, ambulation, ADLs, and to improve the P/AROM. Therapeutic modifications regarding activities in therapies, place, amount of time per day and intensity of therapy made daily. In bed therapies or bedside therapies prn.   2. Bowel Parkinson's related constipation and Bladder dysfunction neurogenic bladder:  frequent toileting, ambulate to bathroom with assistance, check post void residuals. Check for C.difficile x1 if >2 loose stools in 24 hours, continue bowel & bladder program.  Monitor bowel and bladder function. Lactinex 2 PO every AC. MOM prn, Brown Bomb prn, Glycerin suppository prn, enema prn.  3. Severe mid and low back parkinsonian stiffness related pain as well as generalized OA pain: reassess pain every shift and prior to and after each therapy session, give prn Tylenol and low-dose Norco avoiding any NSAIDs, modalities prn in therapy, Lidoderm, K-pad prn.   4. Skin healing bilateral upper extremity bruising and breakdown risk:  continue pressure relief program.  Daily skin exams and reports from nursing. 5. Severe fatigue due to nutritional and hydration deficiency: Add vitamin B12 vitamin D and CoQ10 continue to monitor I&Os, calorie counts prn, dietary consult prn.  6. Acute episodic insomnia with situational adjustment disorder:  prn Ambien, monitor for day time sedation. 7. Falls risk elevated:  patient to use call light to get nursing assistance to get up, bed and chair alarm. 8. Elevated DVT risk: progressive activities in PT, continue prophylaxis BRIAN hose, elevation and avoid all blood thinners due to recent anemia need for transfusion and myelodysplasia. 9. Complex discharge planning:  Weekly team meeting every Monday to assess progress towards goals, discuss and address social, psychological and medical comorbidities and to address difficulties they may be having progressing in therapy. Patient and family education is in progress. The patient is to follow-up with their family physician after discharge.         Complex Active General Medical Issues that complicate care Assess & Plan:    1. Hypertension with   Orthostasis, Aortic valve stenosis, severe, LVH (left ventricular hypertrophy), Diastolic dysfunction-vital signs every shift dose and titrate cardiac medications to include ProAmatine consult hospitalist for backup medical-recheck CBC monitor closely for bleeding  2. Macular degeneration with   Blindness  right eye-usual cues to the right  3. BPH (benign prostatic hyperplasia)-check post void residuals and check recheck UA  4.   PD (Parkinson's disease) -monitor for orthostasis treat comorbidities including pain constipation rigid rigidity and cognitive decline consult neurology and titrate dopaminergic medications  5. MDS (myelodysplastic syndrome), low grade -add B12 recheck CBC add iron with food add Folvite transfuse as needed transfusion scheduled for 7/3/2020  6. Closed fracture of body of lumbar vertebra -add Lidoderm add high-dose vitamin D  7. 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    Primary orthostatic hypotension-check Ortho BPs titrate cardiac medications    KAYLEN Dias MD.O., PM&R     Attending    [facility]

## 2020-07-05 NOTE — FLOWSHEET NOTE
Patient assessment complete earlier this shift. The patient is impulsive and does not use his call light. He has an NATASHA monitor on for safety. Patient has been continent of urine. Patient has denied constipation las  7/4/2020. He gets up with a cane with one person assist and can be unsteady. No distress noted.

## 2020-07-05 NOTE — PROGRESS NOTES
Physical Therapy Rehab Treatment Note  Facility/Department: [facility]  Room: Q043/V422-72       NAME: Irina Forde  : 1940 (59 y.o.)  MRN: [id number]  CODE STATUS: Full Code    Date of Service: 2020        SUBJECTIVE: Subjective: Pt reports doing well today. Pre Treatment Pain Screening  Pain at present: 0  Scale Used: Numeric Score  Intervention List: Patient able to continue with treatment  Pre Treatment Pain Screening  Pain at present: 0  Scale Used: Numeric Score  Intervention List: Patient able to continue with treatment    Post Treatment Pain Screening:  Pain Assessment  Pain Assessment: 0-10  Pain Level: 0    OBJECTIVE:     Transfers  Sit to Stand: Stand by assistance  Stand to sit: Stand by assistance    Ambulation 1  Surface: level tile  Device: Single point cane  Assistance: Stand by assistance;Contact guard assistance;Minimal assistance  Quality of Gait: ataxia, decrease heel strike, pathway devaition  Gait Deviations: Shuffles;Decreased step length;Decreased step height;Slow Renetta  Distance: 275        Exercises  Hip Flexion: x20  Knee Long Arc Quad: x20  Ankle Pumps: x20   Other exercises  Other exercises?: Yes  Other exercises 1: static standing balance  Other exercises 2: weight shifts all planes  Other exercises 3: hamstring/gastroc str     ASSESSMENT/COMMENTS:  Assessment: Pt pleasant and cooperative with all aspects of therapy. Pt required instruction for improving gait quality. Educated on importance of upright posture, and stretches as tolerated. Overall moiz treatment well.         PLAN OF CARE/Safety:   Plan Comment: cont per POC      Therapy Time:   Individual   Time In 3228   Time Out 8571   Minutes 30     Minutes:30      Transfer/Bed mobility training:15      Gait training:15      Neuro re education:     Therapeutic ex:      Matthew Victor PTA, 20 at 1:26 PM

## 2020-07-06 PROCEDURE — 99231 SBSQ HOSP IP/OBS SF/LOW 25: CPT | Performed by: PSYCHIATRY & NEUROLOGY

## 2020-07-06 PROCEDURE — 97110 THERAPEUTIC EXERCISES: CPT

## 2020-07-06 PROCEDURE — 1180000000 HC REHAB R&B

## 2020-07-06 PROCEDURE — 99233 SBSQ HOSP IP/OBS HIGH 50: CPT | Performed by: PHYSICAL MEDICINE & REHABILITATION

## 2020-07-06 PROCEDURE — 97130 THER IVNTJ EA ADDL 15 MIN: CPT

## 2020-07-06 PROCEDURE — 6370000000 HC RX 637 (ALT 250 FOR IP): Performed by: PHYSICAL MEDICINE & REHABILITATION

## 2020-07-06 PROCEDURE — 6370000000 HC RX 637 (ALT 250 FOR IP): Performed by: INTERNAL MEDICINE

## 2020-07-06 PROCEDURE — 97112 NEUROMUSCULAR REEDUCATION: CPT

## 2020-07-06 PROCEDURE — 97535 SELF CARE MNGMENT TRAINING: CPT

## 2020-07-06 PROCEDURE — APPSS30 APP SPLIT SHARED TIME 16-30 MINUTES: Performed by: NURSE PRACTITIONER

## 2020-07-06 PROCEDURE — 97116 GAIT TRAINING THERAPY: CPT

## 2020-07-06 PROCEDURE — 97129 THER IVNTJ 1ST 15 MIN: CPT

## 2020-07-06 RX ADMIN — MENTHOL AND METHYL SALICYLATE: 7.6; 29 OINTMENT TOPICAL at 08:42

## 2020-07-06 RX ADMIN — Medication 100 MG: at 05:28

## 2020-07-06 RX ADMIN — MIDODRINE HYDROCHLORIDE 5 MG: 5 TABLET ORAL at 17:19

## 2020-07-06 RX ADMIN — LATANOPROST 1 DROP: 50 SOLUTION OPHTHALMIC at 21:09

## 2020-07-06 RX ADMIN — CYANOCOBALAMIN TAB 500 MCG 1000 MCG: 500 TAB at 08:40

## 2020-07-06 RX ADMIN — MIDODRINE HYDROCHLORIDE 5 MG: 5 TABLET ORAL at 08:40

## 2020-07-06 RX ADMIN — VITAMIN D, TAB 1000IU (100/BT) 2000 UNITS: 25 TAB at 17:18

## 2020-07-06 RX ADMIN — Medication 100 MG: at 17:19

## 2020-07-06 RX ADMIN — NYSTATIN 500000 UNITS: 100000 SUSPENSION ORAL at 12:34

## 2020-07-06 RX ADMIN — MENTHOL AND METHYL SALICYLATE: 7.6; 29 OINTMENT TOPICAL at 21:12

## 2020-07-06 RX ADMIN — ACETAMINOPHEN 650 MG: 325 TABLET, FILM COATED ORAL at 08:40

## 2020-07-06 RX ADMIN — FOLIC ACID 1000 MCG: 1 TABLET ORAL at 08:39

## 2020-07-06 RX ADMIN — CARBIDOPA AND LEVODOPA 1 TABLET: 25; 100 TABLET ORAL at 12:34

## 2020-07-06 RX ADMIN — CARBIDOPA AND LEVODOPA 1 TABLET: 25; 100 TABLET ORAL at 08:39

## 2020-07-06 RX ADMIN — NYSTATIN 500000 UNITS: 100000 SUSPENSION ORAL at 21:12

## 2020-07-06 RX ADMIN — BRIMONIDINE TARTRATE 1 DROP: 2 SOLUTION OPHTHALMIC at 21:09

## 2020-07-06 RX ADMIN — FERROUS SULFATE TAB 325 MG (65 MG ELEMENTAL FE) 325 MG: 325 (65 FE) TAB at 17:20

## 2020-07-06 RX ADMIN — NYSTATIN 500000 UNITS: 100000 SUSPENSION ORAL at 08:39

## 2020-07-06 RX ADMIN — FERROUS SULFATE TAB 325 MG (65 MG ELEMENTAL FE) 325 MG: 325 (65 FE) TAB at 08:39

## 2020-07-06 RX ADMIN — BRIMONIDINE TARTRATE 1 DROP: 2 SOLUTION OPHTHALMIC at 08:43

## 2020-07-06 RX ADMIN — MIDODRINE HYDROCHLORIDE 5 MG: 5 TABLET ORAL at 12:34

## 2020-07-06 RX ADMIN — CARBIDOPA AND LEVODOPA 1 TABLET: 25; 100 TABLET ORAL at 21:08

## 2020-07-06 ASSESSMENT — ENCOUNTER SYMPTOMS
TROUBLE SWALLOWING: 0
COUGH: 0
COLOR CHANGE: 0
CHEST TIGHTNESS: 0
VOMITING: 0
SHORTNESS OF BREATH: 0
WHEEZING: 0
NAUSEA: 0

## 2020-07-06 ASSESSMENT — PAIN SCALES - GENERAL
PAINLEVEL_OUTOF10: 6
PAINLEVEL_OUTOF10: 2
PAINLEVEL_OUTOF10: 6
PAINLEVEL_OUTOF10: 4
PAINLEVEL_OUTOF10: 4
PAINLEVEL_OUTOF10: 0
PAINLEVEL_OUTOF10: 7

## 2020-07-06 ASSESSMENT — PAIN DESCRIPTION - FREQUENCY: FREQUENCY: INTERMITTENT

## 2020-07-06 ASSESSMENT — PAIN DESCRIPTION - LOCATION
LOCATION: BACK

## 2020-07-06 ASSESSMENT — PAIN DESCRIPTION - ORIENTATION
ORIENTATION: RIGHT;LOWER
ORIENTATION: LOWER
ORIENTATION: RIGHT;LOWER
ORIENTATION: RIGHT

## 2020-07-06 ASSESSMENT — PAIN DESCRIPTION - DESCRIPTORS
DESCRIPTORS: SHARP;STABBING
DESCRIPTORS: BURNING;SHARP
DESCRIPTORS: ACHING;DULL

## 2020-07-06 ASSESSMENT — PAIN DESCRIPTION - PAIN TYPE
TYPE: ACUTE PAIN

## 2020-07-06 ASSESSMENT — PAIN - FUNCTIONAL ASSESSMENT: PAIN_FUNCTIONAL_ASSESSMENT: 0-10

## 2020-07-06 NOTE — PROGRESS NOTES
Occupational Therapy  Facility/Department: [facility]  Daily Treatment Note  NAME: Michell Reeder  : 1940  MRN: [id number]    Date of Service: 2020    Discharge Recommendations:  Continue to assess pending progress       Assessment      OT Education: ADL Adaptive Strategies  Patient Education: Pt educated on use of reacher and sock aid for dressing. Pt demonstrated good understanding and carry over of skills, and verbalized that it was \"easy to use\". Also educated pt on use of reacher to  items at floor level, and pt was able to  socks from floor level with SUP   Activity Tolerance  Activity Tolerance: Patient Tolerated treatment well  Safety Devices  Safety Devices in place: Yes  Type of devices: All fall risk precautions in place         Patient Diagnosis(es): There were no encounter diagnoses. has a past medical history of Actinic keratoses, Aortic valve stenosis, severe, BPH (benign prostatic hyperplasia), Cancer ([facility] [address] Colon polyp, Diastolic dysfunction, ED (erectile dysfunction), Glaucoma, Hemochromatosis, History of echocardiogram, History of echocardiogram, Hypertension, Iron deficiency anemia, LVH (left ventricular hypertrophy), Macular degeneration, Mass on back, Osteoarthritis cervical spine, and Parkinson disease ([address] has a past surgical history that includes Colonoscopy (2009); back surgery; Cataract removal; eye surgery (Bilateral); Tonsillectomy; other surgical history (06/08/15); and Cardiac catheterization (2017).     Restrictions  Restrictions/Precautions  Restrictions/Precautions: Fall Risk  Subjective   General  Chart Reviewed: Yes  Patient assessed for rehabilitation services?: Yes  Response to previous treatment: Patient with no complaints from previous session  Family / Caregiver Present: No  Referring Practitioner: Dr. Ronnell Larsen  Diagnosis: Impaired mobility and ADLs 2° to severe parkinson's disease with blunt chest trauma  Subjective  Subjective: I'll show you the wound. Pain Assessment  Pain Level: 7  Pain Location: Back  Pain Orientation: Lower  Pre Treatment Pain Screening  Pain at present: 6  Scale Used: Numeric Score  Intervention List: Patient able to continue with treatment  Vital Signs  Patient Currently in Pain: Yes   Objective    ADL  LE Dressing: Setup  Additional Comments: Pt was educated on use of sock aid and reacher- pt was able to doff socks and shoes using long armed reacher, and following modeling, was able to load socks onto sock aid, and don on feet with 1 verbal cue for placement of sock on foot that did not already have a sock on it. Pt was able to don shoes with reacher, and needed assistance to tie laces- educated pt on elastic shoe laces         Functional Mobility  Functional - Mobility Device: Cane  Activity: Other(around therapy gym )  Assist Level: Stand by assistance  Functional Mobility Comments: Pt was able to ambulate in therapy gym while using a reacher to  socks at floor level.  no LOB observed     Transfers  Sit to stand: Supervision  Stand to sit: Supervision                                                                 Plan   Plan  Times per week: 5-7x  Plan weeks: 1.5 weeks  Current Treatment Recommendations: Strengthening, Functional Mobility Training, Patient/Caregiver Education & Training, Home Management Training, Cognitive/Perceptual Training, Pain Management, Balance Training, Neuromuscular Re-education, Self-Care / ADL, Safety Education & Training, Endurance Training  Plan Comment: Continue per OT POC       Goals  Patient Goals   Patient goals : \"I want to get back to independence\"       Therapy Time   Individual Concurrent Group Co-treatment   Time In 1400         Time Out 1430         Minutes 30              ADL trainin minutes      JAYCE Cramer Electronically signed by JAYCE Cramer on 2020 at 4:17 PM

## 2020-07-06 NOTE — PROGRESS NOTES
Pt is AO x 3 with some confusion that results in poor safety awareness at times, no c/o pain he usually takes PRN Tylenol and IcyHot to manage his pain, LBM 7/4 call light within reach for pt safety NATASHA in place for pt safety chair alarm engaged for pt safety Electronically signed by Clifton Tarango RN on 7/6/2020 at 1:53 PM

## 2020-07-06 NOTE — CONSULTS
education: Not on file    Highest education level: Not on file   Occupational History    Occupation: retired     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   Social Needs    Financial resource strain: Not hard at all   Luz-Ariel insecurity     Worry: Never true     Inability: Never true   [employer] needs     Medical: No     Non-medical: No   Tobacco Use    Smoking status: Never Smoker    Smokeless tobacco: Never Used   Substance and Sexual Activity    Alcohol use: Yes     Comment: rare    Drug use: No    Sexual activity: Not Currently     Partners: Female   Lifestyle    Physical activity     Days per week: 3 days     Minutes per session: 20 min    Stress: Only a little   Relationships    Social connections     Talks on phone: More than three times a week     Gets together: Once a week     Attends Ep[employer] service: 1 to 4 times per year     Active member of club or organization: No     Attends meetings of clubs or organizations: Never     Relationship status:     Intimate partner violence     Fear of current or ex partner: No     Emotionally abused: No     Physically abused: No     Forced sexual activity: No   Other Topics Concern    Not on file   Social History Narrative    Lives with wife. She is in good health and she still drives. He is retired. He has 3 children all of which live out of the area one in [facility] 1 in [place] and one in the [facility].     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 Pt reports high level of indep prior to admission. Review of Systems   Constitutional: Negative for appetite change, chills, fatigue and fever. HENT: Negative for hearing loss and trouble swallowing. Eyes: Negative for visual disturbance. Respiratory: Negative for cough, chest tightness, shortness of breath and wheezing. Cardiovascular: Negative for chest pain, palpitations and leg swelling. Gastrointestinal: Negative for nausea and vomiting. Genitourinary: Positive for difficulty urinating and frequency. Musculoskeletal: Positive for gait problem. Skin: Negative for color change and rash. Neurological: Positive for dizziness and light-headedness. Negative for tremors, seizures, syncope, facial asymmetry, speech difficulty, weakness, numbness and headaches. Psychiatric/Behavioral: Negative for agitation, confusion and hallucinations. The patient is not nervous/anxious. Physical Exam  Vitals signs and nursing note reviewed. Constitutional:       General: He is not in acute distress. Appearance: He is not diaphoretic. HENT:      Head: Normocephalic and atraumatic. Eyes:      General: No visual field deficit. Extraocular Movements: Extraocular movements intact. Pupils: Pupils are equal, round, and reactive to light. Cardiovascular:      Rate and Rhythm: Normal rate and regular rhythm. Pulmonary:      Effort: Pulmonary effort is normal. No respiratory distress. Breath sounds: Normal breath sounds. Abdominal:      General: Bowel sounds are normal. There is no distension. Palpations: Abdomen is soft. Tenderness: There is no abdominal tenderness. Skin:     General: Skin is warm and dry. Neurological:      General: No focal deficit present.       Mental Status: He is alert and oriented to previous visit. No results found for this or any previous visit. No results found for this or any previous visit. CT of the Head:   Results for orders placed during the hospital encounter of 20   Nika2 Dave Swift    Narrative Patient MRN: [id number]    : 1940    Age:  [de-identified] years    Order Date: 2020 11:00 AM.    Gender: Male    Exam: CT HEAD WO CONTRAST    Number of Images: 183    Indication: Syncope, collapse that began 90 minutes ago. Contrast dosage: None    Comparison: 2018 at CT    Findings: This examination was performed on a CT scanner with dose reduction. Technique: Low-dose CT  acquisition technique included one of following options; 1 . Automated exposure control, 2. Adjustment of MA and or KV according to patient's size or 3. Use of iterative reconstruction. Vascular calcifications within the bilateral internal carotid artery cavernous segments and the vertebral arteries. . Moderate to moderately severe cerebral volume loss/atrophy. No subfalcine, transtentorial or tonsillar herniation or shift. No   intraparenchymal hemorrhage. No extra-axial fluid collection or hematoma. Hypodensities bilateral centrum semiovale and periventricular regions. Empty sella. No Chiari malformation. Mucosal disease left maxillary sinus. No acute fracture or lytic or blastic bony lesion. Postoperative changes left orbit/optic globe. Airways and paranasal sinuses grossly unremarkable. Impression 1. No CT evidence of acute intracranial abnormality, as questioned. 2. Moderate to moderately severe cerebral volume loss/atrophy with white matter changes consistent with sequelae small vessel ischemic disease. .  3. Vascular calcifications within the bilateral internal carotid artery cavernous segments and the vertebral arteries. .  4. Empty sella. No results found for this or any previous visit. No results found for this or any previous visit.     Carotid duplex: No results found for this or any previous visit. No results found for this or any previous visit. Results for orders placed during the hospital encounter of 20   US CAROTID ARTERY BILATERAL    Narrative    Patient MRN:  [id number]    : 1940    Age: [de-identified] years    Gender: Male    Order Date:  2020 2:00 PM    EXAM: US CAROTID ARTERY BILATERAL    NUMBER OF IMAGES:  55    INDICATION:  syncope, vertigo      COMPARISON: Carotid artery ultrasound 2017    FINDINGS:   Right side:  Proximal common carotid artery peak systolic velocity is 397 centimeters per second  Mid, common carotid artery peak systolic velocity is 677 centimeters per second. Distal common carotid artery peak systolic velocity is 29.4 centimeters per second    External carotid artery peak systolic velocity is 55.6 centimeters per second. Proximal internal carotid artery peak systolic velocity is 83.4 centimeters per second  Mid internal carotid artery peak systolic velocity is 22.6 centimeters per second. Distal internal carotid artery peak systolic velocity is 76.1 centimeters per second    Vertebral artery peak systolic velocity is 46.8 centimeters per second. Vertebral artery flow is antegrade    ICA/CCA ratio is 0.85    Left side:  Proximal common carotid artery peak systolic velocity is 948 centimeters per second  Mid, common carotid artery peak systolic velocity is 190 centimeters per second. Distal common carotid artery peak systolic velocity is 79.4 centimeters per second. External carotid artery peak systolic velocity is 47.8 centimeters per second. Proximal internal carotid artery peak systolic velocity is 25.8 centimeters per second  Mid internal carotid artery peak systolic velocity is 65.1 centimeters per second. Distal internal carotid artery peak systolic velocity is 36.4 centimeters per second    Vertebral artery peak systolic velocity is 25.3 centimeters per second.   Vertebral artery flow is antegrade    ICA/CCA ratio is 0.78. Scattered areas of calcified and noncalcified plaque bilateral internal carotid arteries and bilateral carotid bulb/bifurcations. Impression Peak systolic velocities, ICA/CCA ratios and antegrade vertebral artery flow as above. See above for details of the examination and below for internal carotid artery interpretation guidelines. 1. No areas of estimated luminal stenosis greater than 0-49 percent within the bilateral internal carotid arteries. .  2. Scattered areas of calcified and noncalcified plaque bilateral internal carotid arteries and bilateral carotid artery bulb/bifurcations. GOSINK CRITERIA    Diameter          PSV t            EDV t          PSV          EDV          Stenosis Site        %              cm/sec          cm/sec      ICA/CCA    ICA/CCA    0-49                 <124              <40             <2:1           ---                 ---    50-69              125-225                  >2:1           ---                 ---    70-89               225-325          >100          >4:1           >5:1              ---    90%+                >325              >100          >4:1           >9:1           Damped resistive CCA    >95%               May be            May be      Damped      ---              Damped resistive CCA                        decreased      decreased  resistive CCA      Validated velocity measurements with angiographic measurements, velocity criteria are extrapolated from diameter data as defined by the Society of Radiologist in [address] [place] [address] Drive Radiology 2003; [id number]. Echo No results found for this or any previous visit.           Assessment/Plan:  Syncope in the setting of hypotension and acute blood loss anemia secondary to abdominal wall hematoma  Patient with history of Parkinson's disease and primary autonomic dysfunction with orthostatic hypotension  Carotid duplex less than 50% stenosis bilaterally  Continue Sinemet, good symptom management at this time  Continue Midodrine  Consider Northera as outpatient  BRIAN hose  Assess orthostatic blood pressures daily  Continue PT/OT    I independently performed an evaluation on this patient. I have reviewed the above documentation completed by the Nurse Practitioner. Please see my additional contributions to the HPI, physical exam, assessment/medical decision making. Jared Lugo MD, [address], American Board of Psychiatry & Neurology  Board Certified in Vascular Neurology  Board Certified in Neuromuscular Medicine  Certified in Neurorehabilitation         Collaborating physicians: Dr Nohemi Lugo    Electronically signed by NANCY Perez CNP on 7/6/2020 at 12:13 PM

## 2020-07-06 NOTE — PROGRESS NOTES
Subjective: The patient complains of severe  acute on chronic progressive weakness secondary to anemia partially relieved by blood transfusions, rest, PT, OT and exacerbated by current decline in H&H. I am concerned about patients low H&H and platelet count. ROS x10: The patient also complains of severely impaired mobility and activities of daily living. Otherwise no new problems with vision, hearing, nose, mouth, throat, dermal, cardiovascular, GI, , pulmonary, musculoskeletal, psychiatric or neurological. See Rehab H&P on Rehab chart dated . Vital signs:  BP (!) 151/87   Pulse 75   Temp 98 °F (36.7 °C) (Oral)   Resp 20   Ht 6' (1.829 m)   Wt 163 lb 6.4 oz (74.1 kg)   SpO2 97%   BMI 22.16 kg/m²   I/O:   PO/Intake:  fair PO intake, no problems observed or reported. Bowel/Bladder:  continent, no problems noted. General:  Patient is well developed, adequately nourished, non-obese and     well kempt. HEENT:    PERRLA, hearing intact to loud voice, external inspection of ear     and nose benign. Inspection of lips, tongue and gums benign  Musculoskeletal: No significant change in strength or tone. All joints stable. Inspection and palpation of digits and nails show no clubbing,       cyanosis or inflammatory conditions. Neuro/Psychiatric: Affect: flat but pleasant. Alert and oriented to person, place and     situation. No significant change in deep tendon reflexes or     Sensation-poor insight into his deficits  Lungs:  Diminished, CTA-B. Respiration effort is normal at rest.     Heart:   S1 = S2, RRR. No loud murmurs. Abdomen:  Soft, non-tender, no enlargement of liver or spleen. Extremities:  No significant lower extremity edema or tenderness. Skin:   Intact to general survey, no visualized or palpated problems.     Rehabilitation:  Physical therapy: FIMS:  Bed Mobility:      Transfers: Sit to Stand: Stand by assistance  Stand to sit: Stand by assistance  Bed to Chair: Contact guard assistance, Ambulation 1  Surface: level tile  Device: Single point cane  Assistance: Stand by assistance, Contact guard assistance, Minimal assistance  Quality of Gait: ataxia, decrease heel strike, pathway devaition  Gait Deviations: Shuffles, Decreased step length, Decreased step height, Slow Renetta  Distance: 275  Comments: vc's for keeping rollator closer, Stairs  # Steps : 8  Stairs Height: 6\"  Rails: Bilateral  Assistance: Stand by assistance, Contact guard assistance  Comment: Slow to complete, increased fatigue, rest break needed following completion of stairs. FIMS:  ,  , Assessment: Pt pleasant and cooperative with all aspects of therapy. Pt required instruction for improving gait quality. Educated on importance of upright posture, and stretches as tolerated. Overall moiz treatment well. Occupational therapy: FIMS:   ,  , Assessment: Pt requires min A for safety awareness. Pt continues to benefit from OT services to maximize independence during ADLs and IADLs. Speech therapy: FIMS:        Lab/X-ray studies reviewed, analyzed and discussed with patient and staff:   Recent Results (from the past 24 hour(s))   Urinalysis    Collection Time: 07/05/20  9:00 PM   Result Value Ref Range    Color, UA Yellow Straw/Yellow    Clarity, UA Clear Clear    Glucose, Ur Negative Negative mg/dL    Bilirubin Urine Negative Negative    Ketones, Urine Negative Negative mg/dL    Specific Gravity, UA 1.003 1.005 - 1.030    Blood, Urine Negative Negative    pH, UA 6.0 5.0 - 9.0    Protein, UA Negative Negative mg/dL    Urobilinogen, Urine 0.2 <2.0 E.U./dL    Nitrite, Urine Negative Negative    Leukocyte Esterase, Urine Negative Negative       Xr Lumbar Spine   6/29/2020    Nonspecific bowel gas pattern. Vascular calcifications noted abdominal aorta. Normal sagittal alignment lumbar spine. Possible fracture versus osteophyte anterior superior corner of L2.  Moderate facet osteoarthropathy L3-S1. Moderate degenerative disc disease L5-S1. No Pars interarticularis fracture defect. Impression:  1. Possible chip fracture versus osteophyte anterior superior corner of L2, further evaluation with MRI of the lumbar spine recommended. 2. Moderate facet osteoarthropathy L3-S1 with moderate degenerative disc disease L5-S1. 3. Vascular calcifications abdominal aorta. Ct Head   6/29/2020    Vascular calcifications within the bilateral internal carotid artery cavernous segments and the vertebral arteries. . Moderate to moderately severe cerebral volume loss/atrophy. No subfalcine, transtentorial or tonsillar herniation or shift. No intraparenchymal hemorrhage. No extra-axial fluid collection or hematoma. Hypodensities bilateral centrum semiovale and periventricular regions. Empty sella. No Chiari malformation. Mucosal disease left maxillary sinus. No acute fracture or lytic or blastic bony lesion. Postoperative changes left orbit/optic globe. Airways and paranasal sinuses grossly unremarkable. 1. No CT evidence of acute intracranial abnormality, as questioned. 2. Moderate to moderately severe cerebral volume loss/atrophy with white matter changes consistent with sequelae small vessel ischemic disease. . 3. Vascular calcifications within the bilateral internal carotid artery cavernous segments and the vertebral arteries. . 4. Empty sella. Ct Chest   6/29/2020   1. NO ACUTE TRAUMATIC INJURY TO THE CHEST AS DESCRIBED IN BODY OF REPORT. 2. MEDIASTINAL STRUCTURES AND THORACIC AORTA INTACT. NO MEDIASTINAL HEMATOMA. 3. LUNGS APPEAR EXPANDED AND CLEAR WITHOUT A PULMONARY CONTUSION OR PNEUMOTHORAX. 4. DEGENERATIVE BONE SPURRING IN THE THORACIC SPINE AND NO THORACIC SPINE FRACTURE. 5. RIBS APPEAR INTACT AND NO CT EVIDENCE OF A DISPLACED RIB FRACTURE. Ct Abdomen Pelvis  : 6/29/2020   1. SOFT TISSUE CONTUSION INVOLVING THE RIGHT POSTEROLATERAL ABDOMINAL WALL.  2. 10 X 5 X 10 CM RIGHT POSTERIOR ABDOMINAL WALL HEMATOMA WITH L2 CHIP FRACTURE. 3. NO INTRAPERITONEAL OR RETROPERITONEAL HEMORRHAGE OR HEMATOMA. 4. NO ABDOMINAL ORGAN TRAUMATIC INJURY OR ABDOMINAL ORGAN LACERATION. 5. DEGENERATIVE BONE CHANGES IN THE LUMBAR SPINE AND NO LUMBAR SPINE FRACTURE. 6. NO PELVIC FRACTURE AND ADDITIONAL INCIDENTAL FINDINGS IN BODY OF REPORT. Xr Chest   6/29/2020    Lungs and pleura:  No consolidation. No pleural effusion. No pneumothorax. Normal pulmonary vascular pattern. Cardiomediastinal silhouette:  Stable cardiomediastinal silhouette. Other:  No acute osseous findings. Remote fracture deformity left proximal humerus. No acute radiographic abnormality. Us Carotid Artery   6/29/2020    Right side: Proximal common carotid artery peak systolic velocity is 139 centimeters per second Mid, common carotid artery peak systolic velocity is 894 centimeters per second. Distal common carotid artery peak systolic velocity is 20.2 centimeters per second External carotid artery peak systolic velocity is 47.7 centimeters per second. Proximal internal carotid artery peak systolic velocity is 77.7 centimeters per second Mid internal carotid artery peak systolic velocity is 87.3 centimeters per second. Distal internal carotid artery peak systolic velocity is 52.1 centimeters per second Vertebral artery peak systolic velocity is 40.8 centimeters per second. Vertebral artery flow is antegrade ICA/CCA ratio is 0.85 Left side: Proximal common carotid artery peak systolic velocity is 290 centimeters per second Mid, common carotid artery peak systolic velocity is 297 centimeters per second. Distal common carotid artery peak systolic velocity is 84.7 centimeters per second. External carotid artery peak systolic velocity is 15.5 centimeters per second. Proximal internal carotid artery peak systolic velocity is 82.7 centimeters per second Mid internal carotid artery peak systolic velocity is 94.0 centimeters per second.  Distal internal carotid artery peak systolic velocity is 58.2 centimeters per second Vertebral artery peak systolic velocity is 13.2 centimeters per second. Vertebral artery flow is antegrade ICA/CCA ratio is 0.78. Scattered areas of calcified and noncalcified plaque bilateral internal carotid arteries and bilateral carotid bulb/bifurcations. Peak systolic velocities, ICA/CCA ratios and antegrade vertebral artery flow as above. See above for details of the examination and below for internal carotid artery interpretation guidelines. 1. No areas of estimated luminal stenosis greater than 0-49 percent within the bilateral internal carotid arteries. . 2. Scattered areas of calcified and noncalcified plaque bilateral internal carotid arteries and bilateral carotid artery bulb/bifurcations. GOSINK CRITERIA Diameter          PSV t            EDV t          PSV          EDV          Stenosis Site       %              cm/sec          cm/sec      ICA/CCA    ICA/CCA 0-49                 <124              <40             <2:1           ---                 --- 50-69              125-225                  >2:1           ---                 --- 70-89               225-325          >100          >4:1           >5:1              --- 90%+                >325              >100          >4:1           >9:1           Damped resistive CCA >95%                 Previous extensive, complex labs, notes and diagnostics reviewed and analyzed. ALLERGIES:    Allergies as of 07/01/2020 - Review Complete 07/01/2020   Allergen Reaction Noted    Cat hair extract Itching and Swelling 11/06/2018      (please also verify by checking MAR)      Today I evaluated this patient for periodic reassessment of medical and functional status.   The patient was discussed in detail at the treatment team meeting focusing on current medical issues, progress in therapies, social issues, psychological issues, barriers to progress and strategies to address these barriers, and discharge planning. See the addendum to rehab progress note-as a second progress note in the chart. The patient continues to be high risk for future disability and their medical and rehabilitation prognosis continue to be good and therefore, we will continue the patient's rehabilitation course as planned. The patient's tentative discharge date was set. Patient and family education was discussed. The patient was made aware of the team discussion regarding their progress. Complex Physical Medicine & Rehab Issues Assess & Plan:   1. Severe abnormality of gait and mobility and impaired self-care and ADL's secondary to progressive severe Parkinson's disease. Functional and medical status reassessed regarding patients ability to participate in therapies and patient found to be able to participate in acute intensive comprehensive inpatient rehabilitation program including PT/OT to improve balance, ambulation, ADLs, and to improve the P/AROM. Therapeutic modifications regarding activities in therapies, place, amount of time per day and intensity of therapy made daily. In bed therapies or bedside therapies prn.   2. Bowel Parkinson's related constipation and Bladder dysfunction neurogenic bladder:  frequent toileting, ambulate to bathroom with assistance, check post void residuals. Check for C.difficile x1 if >2 loose stools in 24 hours, continue bowel & bladder program.  Monitor bowel and bladder function. Lactinex 2 PO every AC. MOM prn, Brown Bomb prn, Glycerin suppository prn, enema prn.  3. Severe mid and low back parkinsonian stiffness related pain as well as generalized OA pain: reassess pain every shift and prior to and after each therapy session, give prn Tylenol and low-dose Norco avoiding any NSAIDs, modalities prn in therapy, Lidoderm, K-pad prn. Titrate to lowest effective dose of Norco  4.  Skin healing bilateral upper extremity bruising and breakdown risk:  continue pressure relief program. Daily skin exams and reports from nursing. 5. Severe fatigue due to nutritional and hydration deficiency: Add vitamin B12 vitamin D and CoQ10 continue to monitor I&Os, calorie counts prn, dietary consult prn.  6. Acute episodic insomnia with situational adjustment disorder:  prn Ambien, monitor for day time sedation. 7. Falls risk elevated:  patient to use call light to get nursing assistance to get up, bed and chair alarm. 8. Elevated DVT risk: progressive activities in PT, continue prophylaxis BRIAN hose, elevation and avoid all blood thinners due to recent anemia need for transfusion and myelodysplasia. 9. Complex discharge planning: Discharge 7/12/2020 home with his wife and home health care. Weekly team meeting every Monday to assess progress towards goals, discuss and address social, psychological and medical comorbidities and to address difficulties they may be having progressing in therapy. Patient and family education is in progress. The patient is to follow-up with their family physician after discharge. Complex Active General Medical Issues that complicate care Assess & Plan:    1. Hypertension with   Orthostasis, Aortic valve stenosis, severe, LVH (left ventricular hypertrophy), Diastolic dysfunction-vital signs every shift dose and titrate cardiac medications to include ProAmatine consult hospitalist for backup medical-recheck CBC monitor closely for bleeding  2. Macular degeneration with   Blindness  right eye-usual cues to the right  3. BPH (benign prostatic hyperplasia)-check post void residuals and check recheck UA  4.   PD (Parkinson's disease) -monitor for orthostasis treat comorbidities including pain constipation rigid rigidity and cognitive decline consult neurology and titrate dopaminergic medications  5. MDS (myelodysplastic syndrome), low grade -add B12 recheck CBC add iron with food add Folvite transfuse as needed transfusion scheduled for 7/3/2020  6.    Closed fracture of body of lumbar vertebra with active and chronic pain-add Lidoderm add high-dose vitamin D  7. 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 Severe primary orthostatic hypotension-check Ortho BPs titrate cardiac medications add abdominal binder and teds.         Ana Maria Lanza D.O., PM&R     Attending    [address]

## 2020-07-06 NOTE — PROGRESS NOTES
Occupational Therapy  Facility/Department: [facility]  Daily Treatment Note  NAME: Jessica Mccarty  : 1940  MRN: [id number]    Date of Service: 2020    Discharge Recommendations:  Continue to assess pending progress       Assessment      Activity Tolerance  Activity Tolerance: Patient Tolerated treatment well  Safety Devices  Safety Devices in place: Yes  Type of devices: All fall risk precautions in place         Patient Diagnosis(es): There were no encounter diagnoses. has a past medical history of Actinic keratoses, Aortic valve stenosis, severe, BPH (benign prostatic hyperplasia), Cancer ([facility] [address] Colon polyp, Diastolic dysfunction, ED (erectile dysfunction), Glaucoma, Hemochromatosis, History of echocardiogram, History of echocardiogram, Hypertension, Iron deficiency anemia, LVH (left ventricular hypertrophy), Macular degeneration, Mass on back, Osteoarthritis cervical spine, and Parkinson disease ([facility] [address] has a past surgical history that includes Colonoscopy (2009); back surgery; Cataract removal; eye surgery (Bilateral); Tonsillectomy; other surgical history (06/08/15); and Cardiac catheterization (2017). Restrictions  Restrictions/Precautions  Restrictions/Precautions: Fall Risk     Subjective   General  Chart Reviewed: Yes  Patient assessed for rehabilitation services?: Yes  Response to previous treatment: Patient with no complaints from previous session  Family / Caregiver Present: No  Referring Practitioner: Dr. Yazmin Tapia  Diagnosis: Impaired mobility and ADLs 2° to severe parkinson's disease with blunt chest trauma    Subjective  Subjective: I'll show you the wound. Pain Assessment  Pain Level: 4  Pain Type: Acute pain  Pain Location: Back  Pain Orientation: Right; Lower  Pain Descriptors: Marshal Specter; Stabbing  Pre Treatment Pain Screening  Pain at present: 4  Intervention List: Patient able to continue with treatment;Patient declined any intervention Orientation  Orientation  Overall Orientation Status: Within Functional Limits     Objective      Patient with edema noted in B ankles and feet, Syl RN notified. ADL    Grooming: Modified independent     UE Bathing: Modified independent     LE Bathing: Supervision    UE Dressing: Setup    LE Dressing: Minimal assistance(B socks)  Patient reports that spouse dons socks for patient at home. Toileting: Supervision(urine in standing)    Shower Transfers  Shower - Transfer From: [employer] - Transfer Type: To and From  Shower - Transfer To:  Shower seat with back  Shower - Technique: Ambulating  Shower Transfers: Supervision  Shower Transfers Comments: grab bars           Plan   Plan  Times per week: 5-7x  Plan weeks: 1.5 weeks  Current Treatment Recommendations: Strengthening, Functional Mobility Training, Patient/Caregiver Education & Training, Home Management Training, Cognitive/Perceptual Training, Pain Management, Balance Training, Neuromuscular Re-education, Self-Care / ADL, Safety Education & Training, Endurance Training  Plan Comment: Continue per OT POC          Goals  Patient Goals   Patient goals : \"I want to get back to independence\"       Therapy Time   Individual Concurrent Group Co-treatment   Time In 1100         Time Out 1140         Minutes 40             ADL trainin minutes  Missed 20 minutes due to lunch arrived, will attempt makeup as able       Electronically signed by TAYLOR Cade on 20 at 11:50 AM EDT      TAYLOR Cade

## 2020-07-06 NOTE — PROGRESS NOTES
Physical Therapy Rehab Treatment Note  Facility/Department: [facility]  Room: [facility]/U306-47       NAME: Yoon Osorio  : 1940 ([de-identified] y.o.)  MRN: [id number]  CODE STATUS: Full Code    Date of Service: 2020  Chart Reviewed: Yes    Restrictions:  Restrictions/Precautions: Fall Risk       SUBJECTIVE: Subjective: \" I felt good after therapy this morning,\"  Pain Screening  Patient Currently in Pain: Yes  Pre Treatment Pain Screening  Pain at present: 6  Scale Used: Numeric Score  Intervention List: Patient able to continue with treatment    Post Treatment Pain Screenin/10  Pain Assessment  Pain Assessment: 0-10  Pain Level: 6  Pain Type: Acute pain  Pain Location: Back  Pain Orientation: Right  Pain Descriptors: Burning; Sharp    OBJECTIVE:   Overall Orientation Status: Within Functional Limits           Neuromuscular Education  PNF: Gait around obstacles for turning balance and cane coordination. Tandem balance x 1minute each  NDT Treatment: Gait   Neuromuscular Comments: pt showed good cane control and balance while walking through obstacles. Pt had difficulty holding static balance when right foot was forward, demonstrated good ankle and hip strategies. Transfers  Sit to Stand: Stand by assistance  Stand to sit: Stand by assistance    Ambulation  Ambulation?: Yes  More Ambulation?: Yes  Ambulation 1  Surface: level tile;carpet  Device: Rollator  Assistance: Stand by assistance  Quality of Gait: Good ALEN and chilo. Pt demonstrated control and coordination of rollator when turning. Distance: 250'  Ambulation 2  Surface - 2: level tile;carpet  Device 2: Single point cane  Assistance 2: Stand by assistance  Quality of Gait 2: Good chilo and coordination with SPC. Pt had good heel strike and toe off and minimal side to side sway. Distance: 250'  Comments: Pt required Vc to make room for traffic in hallway.      Stairs/Curb  Stairs?: Yes  Stairs  # Steps : 8  Stairs Height: 8\"  Rails:

## 2020-07-06 NOTE — CARE COORDINATION
[address] [facility]  INPATIENT REHABILITATION  TEAM CONFERENCE NOTE  [facility]  Admit Date: 2020       Date: 2020  Patient Name: Chivo Hernandez        MRN: [id number]    : 1940  ([de-identified] y.o.)  Gender: male      Diagnosis: Gait abnormality due to Severe Parkinson's Disease with Blunt Chest Trauma; rehab admission 20    REHAB DIAGNOSIS:   Diagnosis: Gait abnormality due to Severe Parkinson's Disease with Blunt Chest Trauma; rehab admission 20    CO MORBIDITIES:      Past Medical History:   Diagnosis Date    Actinic keratoses     Aortic valve stenosis, severe 2013    BPH (benign prostatic hyperplasia)     Cancer ([facility] [address] 2018    skin cancer on chest removed    Colon polyp     Diastolic dysfunction     Stage 2 by echo    ED (erectile dysfunction) 2011    Glaucoma     Dr. Toby Gibbons Hemochromatosis     History of echocardiogram 2015    mild AS, mild AR, mild TR, mild MR    [place]    Hypertension     Iron deficiency anemia 2014    LVH (left ventricular hypertrophy) 2013    Macular degeneration     bilateral; dry on left and wet on right-Dr. Salina Mcgowan on back 2015    Osteoarthritis cervical spine     Parkinson disease ([facility] [address] [facility]     Dr. Babita Ye     Past Surgical History:   Procedure Laterality Date    BACK SURGERY      CARDIAC CATHETERIZATION  2017    [facility] TUSHAR ECHOLS MD    CATARACT REMOVAL      COLONOSCOPY  2009    single polyp right colon    EYE SURGERY Bilateral     OTHER SURGICAL HISTORY  06/08/15    EXC SUBCU MASS BACK    TONSILLECTOMY       Chart Reviewed: Yes  Family / Caregiver Present: No  General Comment  Comments: agreeable to tx  Restrictions  Restrictions/Precautions: Fall Risk  CASE MANAGEMENT    Social/Functional History  Social/Functional History  Lives With: Spouse  Type of Home: House  Home Layout: Two level, Bed/Bath upstairs, 1/2 bath on main level(handrail both sides for steps to second floor )  Home Access: Level entry  Bathroom Shower/Tub: Walk-in shower  Bathroom Toilet: Standard  Bathroom Equipment: Grab bars in shower, Hand-held shower(plans to buy shower chair)  Home Equipment: Rolling walker(rollator )  Receives Help From: Family  ADL Assistance: Independent  Homemaking Assistance: Independent  Homemaking Responsibilities: Yes  Ambulation Assistance: Independent(cane or rollator prn)  Transfer Assistance: Independent(occassional help with bed mobility on \"bad days\")  Active : No  Education: 2 years of college  Occupation: Retired  Type of occupation: [employer]  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 reports he enjoys walking. Has 3 children who do not live locally       Pts personal preferences:     Pts assets/resources/support system: wife    COVERAGE INFORMATION:Payor: MEDICARE / Plan: MEDICARE PART A AND B / Product Type: *No Product type* /       NURSING  Weight: 163 lb 6.4 oz (74.1 kg) / Body mass index is 22.16 kg/m². DIET GENERAL;    SpO2: 97 % (07/06/20 0812)  No active isolations    Skin Issues: No    Pain Managed: Yes- neck and upper back pain    Bladder continence: Yes    Bowel continence: Yes      Other: NATASHA      PHYSICAL THERAPY  Bed mobility:  Supine to Sit: Stand by assistance (07/06/20 1029)  Sit to Supine: Stand by assistance (07/06/20 1029)  Transfers:  Sit to Stand: Stand by assistance (07/06/20 1316)  Bed to Chair: Contact guard assistance (07/02/20 1328)  Gait:   Device: Rollator (07/06/20 1317)  Assistance: Stand by assistance (07/06/20 1317)  Distance: 250' (07/06/20 1317)  Quality of Gait: Good ALEN and chilo. Pt demonstrated control and coordination of rollator when turning.  (07/06/20 1317)  Comments: vc's for keeping rollator closer (07/03/20 1414)  Stairs:  # Steps : 12 (07/06/20 1015)  Rails: Bilateral (07/06/20 1015)  Assistance: Stand by assistance (07/06/20 1015)  Comment: improved tolerance of stairs and gt this session. not in need of a break after steps (07/06/20 1015)  W/C mobility:     LTG:  Long term goal 1: Pt to complete all bed mobility with indep  Long term goal 2: Pt to complete all transfers with indep  Long term goal 3: Pt to ambulate 150ft with LRD and indep in protected/home environments, and supervision in the community  Long term goal 4: Pt to manage 4 steps with HR and indep; curb step with indep  PT Treatment Time:  1.5 hrs      OCCUPATIONAL THERAPY  Hand Dominance: Right  ADL  Feeding: Independent (07/04/20 1008)  Grooming: Modified independent  (07/06/20 1147)  UE Bathing: Modified independent  (07/06/20 1147)  LE Bathing: Supervision (07/06/20 1147)  UE Dressing: Setup (07/06/20 1147)  LE Dressing: Minimal assistance(B socks) (07/06/20 1147)  Toileting: Supervision(urine in standing) (07/06/20 1147)  Additional Comments: Pt seated on commode upon OT arrival (07/03/20 1135)  Toilet Transfers  Toilet - Technique: Stand pivot (07/04/20 1557)  Equipment Used: Grab bars (07/04/20 1557)  Toilet Transfer: Supervision (07/04/20 1557)  Toilet Transfers Comments: Pt transferred from [facility] to toilet then transferred off toilet to ambulating without a device to sink (07/04/20 1557)     Shower Transfers  Shower - Transfer From: Jemal Maria (07/06/20 1148)  Shower - Transfer Type: To and From (07/06/20 1148)  Shower - Transfer To:  Shower seat with back (07/06/20 1148)  Shower - Technique: Ambulating (07/06/20 1148)  Shower Transfers: Supervision (07/06/20 1148)  Shower Transfers Comments: grab bars (07/06/20 1148)  LTG:  Eating  Assistance Needed: Independent  CARE Score: 6  Discharge Goal: Independent, Oral Hygiene  Assistance Needed: Setup or clean-up assistance  CARE Score: 5  Discharge Goal: Independent, Toileting Hygiene  Assistance Needed: Supervision or touching assistance  CARE Score: 4  Discharge Goal: Independent, Shower/Bathe Self  Assistance Needed: Supervision or touching assistance  CARE Score: 4  Discharge Goal: Independent  Upper Body Dressing  Assistance Needed: Supervision or touching assistance  CARE Score: 4  Discharge Goal: Independent, Lower Body Dressing  Assistance Needed: Supervision or touching assistance  CARE Score: 4  Discharge Goal: Independent, Putting On/Taking Off Footwear  Assistance Needed: Substantial/maximal assistance  CARE Score: 2  Discharge Goal: Independent, Toilet Transfer  Assistance Needed: Supervision or touching assistance  CARE Score: 4  Discharge Goal: Independent  OT Treatment Time: 1.5 hrs      SPEECH THERAPY    Motor Speech: Exceptions to WFL(Pt demonstrates decreased loudness secondary to Parkinson's disease.)  Comprehension: Within Functional Limits  Verbal Expression: Within functional limits      Diet/Swallow:  Diet Solids Recommendation: Regular  Liquid Consistency Recommendation: Thin  Dysphagia Outcome Severity Scale: Level 5: Mild dysphagia- Distant supervision. May need one diet consistency restricted    Compensatory Swallowing Strategies: Small bites/sips, Eat/Feed slowly, Upright as possible for all oral intake         LTG:  Long-term Goals  Timeframe for Long-term Goals: 2-3 weeks  Goal 1: Pt will increase cognitive-linguistic skills to standby assist level in order to promote safety with the completion of ADLs upon discharge. Goal 2: Pt will increase speech intelligibility to min assist level in order to effectively communicate wants and needs with caregivers upon discharge. Long-term Goals  Timeframe for Long-term Goals: 2-3 weeks  Goal 1: Pt will tolerate least restrictive diet consistency with no adverse outcomes. Additional Comments: Pt has been diagnosed with Parkinson's Disease. COGNITION  OT: Cognition Comment: Comprehension:  Mod I, Expression: Independent, Social interaction: Independent, Problem Solving: Min A, Memory- Min A  SP:Memory: Exceptions to [facility]  Problem Solving: Exceptions to WFL(Pt displayed tangential responses to problem solving questions.)    RECREATIONAL THERAPY  Attendance to recreational therapy programs:    []  Pet Therapy  [] Music Therapy  [] Art Therapy    [] Recreation Therapy Group [] Support Group           Patient social interaction (mood, participation): good      Patient strengths: motivated    Patients goal:  \"I want to get back to independence\"    Problems/Barriers: easily distracted        1. Safety:          - Intervention / Plan:    [x]  falls protocol     [x]  PT/OT    [x]  SP        - Results:         2. Potential DME needs:         - Intervention / Plan:  [x]  PT/OT     [x]  Assess equipment needs/access       - Results:         3. Weakness:          - Intervention / Plan:  [x]  PT/OT      []  Other:         - Results:         4. Discharge planning needs:          - Intervention / Plan:  [x]  Weekly team conference      [x]  family training        - Results:         5.            - Intervention / Plan:          - Results:         6.            - Intervention / Plan:         - Results:         7.            - Intervention / Plan:         - Results:           Discharge Plan   Estimated Length of Stay: 12 days    Tentative Discharge date: 7/12/20    Anticipated Discharge Destination:  Home      Team recommendations:    1. Follow up Therapy :    PT  OT  SLP    2. Outpatient    Other:     Equipment needed at Discharge:  Other: TBD      Team Members Present at Conference:    Physician: Dr. Yanez Score  : Tresia Schirmer, RN  RN: Wilford Councilman, RN  Physical Therapist: Ariadna Biggs PT  Occupational Therapist: Griselda Collins OTR  Speech Therapist: Flores Calloway, SLP    Electronically signed by Lily Tomas RN on 7/7/2020 at 2:54 PM

## 2020-07-06 NOTE — PROGRESS NOTES
[facility]  Facility/Department: [facility]  Speech Language Pathology   Treatment Note          Nancy Millard  1940  [id number]/[id number]        Rehab Dx/Hx: Abnormality of gait and mobility [R26.9]  Abnormality of gait and mobility [R26.9]    Precautions: falls    Medical Dx: Abnormality of gait and mobility [R26.9]  Abnormality of gait and mobility [R26.9]  Speech Dx: Cognitive Linguistic Impairment     Date: 7/6/2020    Subjective:  Alert, Cooperative and Pleasant        Interventions used this date:  Cognitive Skill Development    Objective/Assessment:  Patient progressing towards goals:  Short-term Goals  Timeframe for Short-term Goals: 2-3 weeks    Goal 1: Pt will complete mid-high level problem solving task related to ADLs with 80% acc given cues as needed in order to promote safety with the completion of ADLs upon discharge. Goal 2: Pt will complete immediate and delayed recall task with 80% acc given cues as needed in order to promote safety with the completion of ADLs upon discharge. Goal 3: Pt will be educated on 3 memory strategies and will state their use in a functional activity with 80% acc given cues as needed in order to promote safety with the completion of ADLs. Goal 4: Pt will be educated on speech intelligibility strategies (over articulation, abdominal breathing) and will demonstrate use at sentence level with 80% acc given cues as needed in order to promote effective communication of wants and needs with caregivers upon discharge. Goal 5: Within 1-3 days of the implementation of ST POC, pt will participate in further assessment of medication and financial managment. New goals to be added as deemed necessary. Patient reports that he does not manage his own medication but does manage his own finances. He reports that his wife takes care of the medication.  Patient's financial management abilities were assessed via check writing and grocery shopping activity with the results below:  - Check writin% accuracy with clear and concise writing.   - Grocery shopping (simple math): 80% accuracy increasing to 100% accuracy with min verbal cues. Overall, patient's financial management abilities appear to be within normal limits and goals to address this skill are not warranted at this time. D/c goal.     Long-term Goals  Timeframe for Long-term Goals: 2-3 weeks  Goal 1: Pt will increase cognitive-linguistic skills to standby assist level in order to promote safety with the completion of ADLs upon discharge. Goal 2: Pt will increase speech intelligibility to min assist level in order to effectively communicate wants and needs with caregivers upon discharge. Short-term Goals  Timeframe for Short-term Goals: 2-3 weeks  Goal 1: Pt will tolerate 5/5 trials of regular consistencies and thin liquids with no overt s/s of aspiration observed. Compensatory Swallowing Strategies: Small bites/sips, Eat/Feed slowly, Upright as possible for all oral intake      Treatment/Activity Tolerance:  Patient tolerated treatment well    Plan:  Continue per POC    Pain:  Patient reported it is an acceptable level for treatment. Patient/Caregiver Education:  Patient educated on session and progression towards goals. Safety Devices:   All fall risk precautions in place      [id number] ([facility]):    SWALLOWING  Rating:  DNT    SPOKEN LANGUAGE COMPREHENSION  Ratin    SPOKEN LANGUAGE EXPRESSION  Ratin    MOTOR SPEECH  Rating:  DNT    PROBLEM SOLVING  Ratin    MEMORY  Rating:  DNT          Therapy Time  SLP Individual Minutes  Time In: 1030  Time Out: 1100  Minutes: 30              Signature: Electronically signed by JAMES Vigil on 2020 at 3:10 PM

## 2020-07-06 NOTE — PROGRESS NOTES
INDIVIDUALIZED OVERALL REHAB PLAN OF CARE  ADDENDUM TO REHAB PROGRESS NOTE-for audit purposes must also refer to this day's clinical note and combine the information      Date: 2020  Patient Name: Michael Chapa   Room: S026/P200-04    MRN: [id number]    : 1940  ([de-identified] y.o.)  Gender: male       Today 2020 during weekly team meeting, I reviewed the patient Michael Chapa in detail with the therapists and nurses involved in patient's care gathering complex physiatric data regarding current medical issues, progress in therapies, factors limiting progress, social issues, psychological issues, ongoing therapeutic plans and discharge planning. Legend:  I= independent Im =Modified independent  S=Supervised SB=stand by LUND=set up CG=contact jac Min= minimal Mod=Moderate Max=maximal Max of 2 =maximal assist of 2 people      CURRENT FUNCTIONAL STATUS:    NURSING ISSUES:       Pt is AO x 3 with some confusion that results in poor safety awareness at times, no c/o pain he usually takes PRN Tylenol and IcyHot to manage his pain, LBM  call light within reach for pt safety NATASHA in place for pt safety chair alarm engaged for pt safety   Nursing will continue to focus on bowel and bladder continence transitioning toward independence by time of discharge. Monitoring post void residuals monitoring for severe constipation and bowel obstruction. Focus on achieving ADL goals with co-treating with OT when possible. PHYSICAL THERAPY  Bed mobility:  Supine to Sit: Stand by assistance (20 1029)  Sit to Supine: Stand by assistance (20 1029)  Transfers:  Sit to Stand: Stand by assistance (20 1316)  Bed to Chair: Contact guard assistance (20 1328)  Gait:   Device: Rollator (20 1317)  Assistance: Stand by assistance (20 1317)  Distance: 250' (20 1317)  Quality of Gait: Good ALEN and chilo. Pt demonstrated control and coordination of rollator when turning.  (20 1317)  Comments: vc's for keeping rollator closer (07/03/20 1414)  Stairs:  # Steps : 8 (07/06/20 1317)  Rails: Bilateral (07/06/20 1317)  Assistance: Stand by assistance (07/06/20 1317)  Comment: Pt able to ascend and descend stairs safely without any VC. Able to advance back to chair after completion of stairs. (07/06/20 1317)  W/C mobility:         OCCUPATIONAL THERAPY  Hand Dominance: Right  ADL  Feeding: Independent (07/04/20 1008)  Grooming: Modified independent  (07/06/20 1147)  UE Bathing: Modified independent  (07/06/20 1147)  LE Bathing: Supervision (07/06/20 1147)  UE Dressing: Setup (07/06/20 1147)  LE Dressing: Minimal assistance(B socks) (07/06/20 1147)  Toileting: Supervision(urine in standing) (07/06/20 1147)  Additional Comments: Pt seated on commode upon OT arrival (07/03/20 1135)  Toilet Transfers  Toilet - Technique: Stand pivot (07/04/20 1557)  Equipment Used: Grab bars (07/04/20 1557)  Toilet Transfer: Supervision (07/04/20 1557)  Toilet Transfers Comments: Pt transferred from [facility] to toilet then transferred off toilet to ambulating without a device to sink (07/04/20 1557)     [address] - Transfer From: .S. [facility] (07/06/20 1148)  Shower - Transfer Type: To and From (07/06/20 1148)  Shower - Transfer To: Shower seat with back (07/06/20 1148)  Shower - Technique: Ambulating (07/06/20 1148)  Shower Transfers: Supervision (07/06/20 1148)  Shower Transfers Comments: grab bars (07/06/20 1148)      SPEECH THERAPY  Motor Speech: (Reduced voluime of voice related to Parkinsons diisease)  Comprehension: Within Functional Limits(WFL for complex directions)  Verbal Expression: (MIld repetition deficits related to memory deficits)      Diet/Swallow:  Diet Solids Recommendation: Regular  Liquid Consistency Recommendation: Thin  Dysphagia Outcome Severity Scale: Level 5: Mild dysphagia- Distant supervision.  May need one diet consistency restricted    Compensatory Swallowing Strategies: Small bites/sips, Eat/Feed slowly, Upright as possible for all oral intake             COGNITION  OT: Cognition Comment: Comprehension: Mod I, Expression: Independent, Social interaction: Independent, Problem Solving: Min A, Memory- Min A  SP:Memory: (Mild to moderate STM and working memory deficits)  Problem Solving: (Mild deficits noted with medication and money management, Assists at discharge with management of money and medication)        THERAPY, MEDICAL AND NURSING COORDINATION:    []  Pain medication before therapies     []  Check orthostatic BP      [x]  Ambulate to the bathroom in room    [x]  Add scheduled rest beaks     []  In room therapies      Discharge date set for:           7/12/2020          Home with:    With his wife with help from   his wife and family          And:     [address]:     [x]  PT    []  OT    []  ST   [x]  Aide   []  SW    [x]  RN                    Outpatient Therapy:  []  PT    []  OT    []  ST   []  Rehab Psych                 Equipment:  [employer]      At D/C their function is goaled at:   PT:Long term goal 1: Pt to complete all bed mobility with indep  Long term goal 2: Pt to complete all transfers with indep  Long term goal 3: Pt to ambulate 150ft with LRD and indep in protected/home environments, and supervision in the community  Long term goal 4: Pt to manage 4 steps with HR and indep; curb step with indep  OT:Eating  Assistance Needed: Independent  CARE Score: 6  Discharge Goal: Independent, Oral Hygiene  Assistance Needed: Setup or clean-up assistance  CARE Score: 5  Discharge Goal: Independent, [address] Needed: Supervision or touching assistance  CARE Score: 4  Discharge Goal: Independent, Shower/Bathe Self  Assistance Needed: Supervision or touching assistance  CARE Score: 4  Discharge Goal: Independent  Upper Body Dressing  Assistance Needed: Supervision or touching assistance  CARE Score: 4  Discharge Goal: Independent, Lower Body Dressing  Assistance Needed: Supervision or touching assistance  CARE Score: 4  Discharge Goal: Independent, Putting On/Taking Off Footwear  Assistance Needed: Substantial/maximal assistance  CARE Score: 2  Discharge Goal: Independent, Toilet Transfer  Assistance Needed: Supervision or touching assistance  CARE Score: 4  Discharge Goal: Independent  SP:Long-term Goals  Timeframe for Long-term Goals: 2-3 weeks  Goal 1: Pt will increase cognitive-linguistic skills to standby assist level in order to promote safety with the completion of ADLs upon discharge. Goal 2: Pt will increase speech intelligibility to min assist level in order to effectively communicate wants and needs with caregivers upon discharge. Long-term Goals  Timeframe for Long-term Goals: 2-3 weeks  Goal 1: Pt will tolerate least restrictive diet consistency with no adverse outcomes. Additional Comments: Pt has been diagnosed with Parkinson's Disease. From a cognitive standpoint they will need:        24 hr supervision  --progress to occasional           Significant problems/ barriers to functional progress include: Pt is at a high risk for functional loss,    [x]  Acute infection/UTI    []  Low BP's     []  COPD flare-up   []  Uncontrolled blood sugar     []  Progressive anemia         [x]  Severe pain exacerbation add Norco to lowest effective dose and IcyHot with massage     []  Impaired mental status    []  Urinary incontinence    []  Bowel incontinence           Plan to correct barriers to functional progress: Add scheduled rest breaks, control pain by using ice Lidoderm rest and massage as well as pain medications prior to therapy. Based on a comprehensive evaluation of the above, the individualized therapy and Discharge plan will be:    -Times stated are an average that will be varied based on the patient's daily need.        PT ____2__hrs/day 5-7 days per week           OT ___1___hrs per day 5-7 days per week ST ____1/2___hrs /day 3-5 days per week       Estimated LOS 2 week(s)    - Overall functional prognosis:     [x]  Good  to  [x]  Fair    []  Poor -Medical Prognosis:   [x]  Good to   [x]  Fair    []  Poor    This patient was made aware of the discussion of Plan of Care, their projected dicharge date and their projected function at discharge.        Taisha Lee DO

## 2020-07-06 NOTE — PROGRESS NOTES
Physical Therapy Rehab Treatment Note  Facility/Department: [facility]  Room: [facility]/M115-91       NAME: Yudy Whitt  : 1940 ([de-identified] y.o.)  MRN: [id number]  CODE STATUS: Full Code    Date of Service: 2020  Chart Reviewed: Yes  Family / Caregiver Present: No  General Comment  Comments: agreeable to tx    Restrictions:  Restrictions/Precautions: Fall Risk       SUBJECTIVE: Subjective: \"my back hurts from my fall\"  Pain Screening  Patient Currently in Pain: Yes  Pre Treatment Pain Screening  Pain at present: 4  Scale Used: Numeric Score    Post Treatment Pain Screenin  Pain Assessment  Pain Assessment: 0-10  Pain Level: 4  Pain Type: Acute pain  Pain Location: Back  Pain Orientation: Right; Lower  Pain Descriptors: Aching;Dull  Pain Frequency: Intermittent    OBJECTIVE:   Overall Orientation Status: Within Functional Limits           Neuromuscular Education  Neuromuscular Comments: around bolsters with cane. gets too close to left side, one instance of retropulsion needing min assist for correction. Bed mobility  Supine to Sit: Stand by assistance  Sit to Supine: Stand by assistance    Transfers  Sit to Stand: Stand by assistance  Stand to sit: Stand by assistance    Ambulation  Ambulation?: Yes  Ambulation 1  Surface: level tile  Device: Single point cane  Assistance: Stand by assistance;Contact guard assistance  Quality of Gait: good sequencing with cane, wbos, guarded motions, no lob with change in direction  Gait Deviations: Slow Renetta; Increased ALEN; Decreased step length  Distance: 150 feet    Stairs/Curb  Stairs?: Yes  Stairs  # Steps : 12  Stairs Height: 8\"  Rails: Bilateral  Assistance: Stand by assistance  Comment: improved tolerance of stairs and gt this session. not in need of a break after steps    ASSESSMENT/COMMENTS:pt progressing but continues to struggle with his back pain from his fall.        PLAN OF CARE/Safety:          Therapy Time:   Individual   Time In 1000   Time Out 1030   Minutes 30     Minutes:30      Transfer/Bed mobility training:10      Gait training:10      Neuro re education:10     Therapeutic ex:0      Paige Lye PTA, 07/06/20 at 10:32 AM

## 2020-07-07 LAB
BASOPHILS ABSOLUTE: 0.1 K/UL (ref 0–0.2)
BASOPHILS RELATIVE PERCENT: 2.1 %
EOSINOPHILS ABSOLUTE: 0.2 K/UL (ref 0–0.7)
EOSINOPHILS RELATIVE PERCENT: 4.1 %
HCT VFR BLD CALC: 24.7 % (ref 42–52)
HEMOGLOBIN: 8.2 G/DL (ref 14–18)
LYMPHOCYTES ABSOLUTE: 1 K/UL (ref 1–4.8)
LYMPHOCYTES RELATIVE PERCENT: 21.5 %
MCH RBC QN AUTO: 35.1 PG (ref 27–31.3)
MCHC RBC AUTO-ENTMCNC: 33.4 % (ref 33–37)
MCV RBC AUTO: 105.2 FL (ref 80–100)
MONOCYTES ABSOLUTE: 0.6 K/UL (ref 0.2–0.8)
MONOCYTES RELATIVE PERCENT: 14.1 %
NEUTROPHILS ABSOLUTE: 2.6 K/UL (ref 1.4–6.5)
NEUTROPHILS RELATIVE PERCENT: 58.2 %
PDW BLD-RTO: 20.2 % (ref 11.5–14.5)
PLATELET # BLD: 215 K/UL (ref 130–400)
RBC # BLD: 2.34 M/UL (ref 4.7–6.1)
WBC # BLD: 4.4 K/UL (ref 4.8–10.8)

## 2020-07-07 PROCEDURE — 97116 GAIT TRAINING THERAPY: CPT

## 2020-07-07 PROCEDURE — 6370000000 HC RX 637 (ALT 250 FOR IP): Performed by: INTERNAL MEDICINE

## 2020-07-07 PROCEDURE — 97130 THER IVNTJ EA ADDL 15 MIN: CPT

## 2020-07-07 PROCEDURE — 97110 THERAPEUTIC EXERCISES: CPT

## 2020-07-07 PROCEDURE — 97530 THERAPEUTIC ACTIVITIES: CPT

## 2020-07-07 PROCEDURE — 36415 COLL VENOUS BLD VENIPUNCTURE: CPT

## 2020-07-07 PROCEDURE — 97535 SELF CARE MNGMENT TRAINING: CPT

## 2020-07-07 PROCEDURE — 97129 THER IVNTJ 1ST 15 MIN: CPT

## 2020-07-07 PROCEDURE — 85025 COMPLETE CBC W/AUTO DIFF WBC: CPT

## 2020-07-07 PROCEDURE — 99232 SBSQ HOSP IP/OBS MODERATE 35: CPT | Performed by: PHYSICAL MEDICINE & REHABILITATION

## 2020-07-07 PROCEDURE — 97112 NEUROMUSCULAR REEDUCATION: CPT

## 2020-07-07 PROCEDURE — 6370000000 HC RX 637 (ALT 250 FOR IP): Performed by: PHYSICAL MEDICINE & REHABILITATION

## 2020-07-07 PROCEDURE — 1180000000 HC REHAB R&B

## 2020-07-07 RX ADMIN — MIDODRINE HYDROCHLORIDE 5 MG: 5 TABLET ORAL at 17:20

## 2020-07-07 RX ADMIN — BRIMONIDINE TARTRATE 1 DROP: 2 SOLUTION OPHTHALMIC at 07:52

## 2020-07-07 RX ADMIN — MENTHOL AND METHYL SALICYLATE: 7.6; 29 OINTMENT TOPICAL at 08:05

## 2020-07-07 RX ADMIN — NYSTATIN 500000 UNITS: 100000 SUSPENSION ORAL at 07:52

## 2020-07-07 RX ADMIN — FERROUS SULFATE TAB 325 MG (65 MG ELEMENTAL FE) 325 MG: 325 (65 FE) TAB at 17:20

## 2020-07-07 RX ADMIN — MIDODRINE HYDROCHLORIDE 5 MG: 5 TABLET ORAL at 13:07

## 2020-07-07 RX ADMIN — CARBIDOPA AND LEVODOPA 1 TABLET: 25; 100 TABLET ORAL at 13:07

## 2020-07-07 RX ADMIN — MENTHOL AND METHYL SALICYLATE: 7.6; 29 OINTMENT TOPICAL at 21:18

## 2020-07-07 RX ADMIN — NYSTATIN 500000 UNITS: 100000 SUSPENSION ORAL at 21:17

## 2020-07-07 RX ADMIN — Medication 100 MG: at 07:52

## 2020-07-07 RX ADMIN — MENTHOL AND METHYL SALICYLATE: 7.6; 29 OINTMENT TOPICAL at 13:09

## 2020-07-07 RX ADMIN — VITAMIN D, TAB 1000IU (100/BT) 2000 UNITS: 25 TAB at 17:20

## 2020-07-07 RX ADMIN — BRIMONIDINE TARTRATE 1 DROP: 2 SOLUTION OPHTHALMIC at 21:17

## 2020-07-07 RX ADMIN — CYANOCOBALAMIN TAB 500 MCG 1000 MCG: 500 TAB at 07:52

## 2020-07-07 RX ADMIN — Medication 100 MG: at 17:20

## 2020-07-07 RX ADMIN — NYSTATIN 500000 UNITS: 100000 SUSPENSION ORAL at 13:07

## 2020-07-07 RX ADMIN — MIDODRINE HYDROCHLORIDE 5 MG: 5 TABLET ORAL at 07:52

## 2020-07-07 RX ADMIN — FERROUS SULFATE TAB 325 MG (65 MG ELEMENTAL FE) 325 MG: 325 (65 FE) TAB at 07:52

## 2020-07-07 RX ADMIN — CARBIDOPA AND LEVODOPA 1 TABLET: 25; 100 TABLET ORAL at 21:17

## 2020-07-07 RX ADMIN — CARBIDOPA AND LEVODOPA 1 TABLET: 25; 100 TABLET ORAL at 07:52

## 2020-07-07 RX ADMIN — FOLIC ACID 1000 MCG: 1 TABLET ORAL at 07:52

## 2020-07-07 RX ADMIN — LATANOPROST 1 DROP: 50 SOLUTION OPHTHALMIC at 21:17

## 2020-07-07 ASSESSMENT — PAIN - FUNCTIONAL ASSESSMENT: PAIN_FUNCTIONAL_ASSESSMENT: 0-10

## 2020-07-07 ASSESSMENT — PAIN DESCRIPTION - LOCATION
LOCATION: BACK

## 2020-07-07 ASSESSMENT — PAIN DESCRIPTION - PAIN TYPE
TYPE: ACUTE PAIN
TYPE: ACUTE PAIN

## 2020-07-07 ASSESSMENT — PAIN DESCRIPTION - FREQUENCY: FREQUENCY: CONTINUOUS

## 2020-07-07 ASSESSMENT — PAIN DESCRIPTION - ORIENTATION
ORIENTATION: LOWER
ORIENTATION: RIGHT;LOWER
ORIENTATION: LOWER
ORIENTATION: LOWER

## 2020-07-07 ASSESSMENT — PAIN SCALES - GENERAL
PAINLEVEL_OUTOF10: 7
PAINLEVEL_OUTOF10: 6
PAINLEVEL_OUTOF10: 6
PAINLEVEL_OUTOF10: 0

## 2020-07-07 ASSESSMENT — PAIN DESCRIPTION - PROGRESSION: CLINICAL_PROGRESSION: GRADUALLY WORSENING

## 2020-07-07 ASSESSMENT — PAIN DESCRIPTION - DESCRIPTORS
DESCRIPTORS: SORE
DESCRIPTORS: SORE;ACHING

## 2020-07-07 ASSESSMENT — PAIN DESCRIPTION - ONSET: ONSET: ON-GOING

## 2020-07-07 NOTE — PROGRESS NOTES
Occupational Therapy  Facility/Department: [facility]  Daily Treatment Note  NAME: Lizeth Thomas  : 1940  MRN: [id number]    Date of Service: 2020    Discharge Recommendations:  Continue to assess pending progress    Assessment: Pt tolerated treatment well. Pt required Minimal encouragement to complete ADLs. Pt reports that his wife assists him with LB dressing at times secondary to back pain. Activity Tolerance  Activity Tolerance: Patient Tolerated treatment well  Safety Devices  Safety Devices in place: Yes  Type of devices: All fall risk precautions in place         Patient Diagnosis(es): There were no encounter diagnoses. has a past medical history of Actinic keratoses, Aortic valve stenosis, severe, BPH (benign prostatic hyperplasia), Cancer ([address] Colon polyp, Diastolic dysfunction, ED (erectile dysfunction), Glaucoma, Hemochromatosis, History of echocardiogram, History of echocardiogram, Hypertension, Iron deficiency anemia, LVH (left ventricular hypertrophy), Macular degeneration, Mass on back, Osteoarthritis cervical spine, and Parkinson disease ([address] has a past surgical history that includes Colonoscopy (2009); back surgery; Cataract removal; eye surgery (Bilateral); Tonsillectomy; other surgical history (06/08/15); and Cardiac catheterization (2017). Restrictions  Restrictions/Precautions  Restrictions/Precautions: Fall Risk     Subjective   General  Chart Reviewed: Yes  Patient assessed for rehabilitation services?: Yes  Response to previous treatment: Patient with no complaints from previous session  Family / Caregiver Present: Yes(Wife)  Referring Practitioner: Dr. Celso Plummer  Diagnosis: Impaired mobility and ADLs 2° to severe parkinson's disease with blunt chest trauma  Subjective  Subjective: I'll show you the wound.   Pain Assessment  Pain Assessment: 0-10  Pain Level: 7  Pain Type: Acute pain  Pain Location: Back  Pain Orientation: Lower  Pain Descriptors: Sore;Aching  Pain Frequency: Continuous  Pain Onset: On-going  Clinical Progression: Gradually worsening  Non-Pharmaceutical Pain Intervention(s): Declines; Rest  Response to Pain Intervention: Patient Satisfied  Pre Treatment Pain Screening  Pain at present: 6  Scale Used: Numeric Score  Intervention List: Patient able to continue with treatment;Patient declined any intervention  Vital Signs  Patient Currently in Pain: Yes     Objective  ADL  Grooming: Modified independent (To wash face, perform oral care, and hair care while standing at sink)  UE Dressing: Setup;Supervision (To doff/don t-shirt and sweater while standing)  LE Dressing: Supervision;Setup; Increased time to complete (To doff/don underwear, pants, socks, and shoes. Pt required Minimal encouragement to perform Independently.)  Toileting: Supervision (Pt sat to urinate)     Toilet Transfers  Toilet - Technique: Ambulating  Equipment Used: Grab bars  Toilet Transfer: Supervision  Toilet Transfers Comments: Standard cane     Upper Extremity Function  UE Strengthing: BUE HEP (can exercises): 1# dumbbell, 1x10 reps, various planes, rest breaks prn. To increase BUE strength and endurance for improved transfers. Educated pt in exercise technique and provided cues to maintain.      Plan  Plan  Times per week: 5-7x  Plan weeks: 1.5 weeks  Current Treatment Recommendations: Strengthening, Functional Mobility Training, Patient/Caregiver Education & Training, Home Management Training, Cognitive/Perceptual Training, Pain Management, Balance Training, Neuromuscular Re-education, Self-Care / ADL, Safety Education & Training, Endurance Training  Plan Comment: Continue per OT POC    Goals  Patient Goals   Patient goals : \"I want to get back to independence\"    Therapy Time   Individual Concurrent Group Co-treatment   Time In 0935         Time Out 1005         Minutes 30         ADL training: 15 minutes  Therapeutic activities: 15 minutes    Electronically signed by

## 2020-07-07 NOTE — PLAN OF CARE
Problem: Falls - Risk of:  Goal: Will remain free from falls  Description: Will remain free from falls  7/7/2020 0247 by Paty Sheehan RN  Outcome: Ongoing     Problem: Falls - Risk of:  Goal: Absence of physical injury  Description: Absence of physical injury  7/7/2020 0247 by Paty Sheehan RN  Outcome: Ongoing     Problem: Skin Integrity:  Goal: Will show no infection signs and symptoms  Description: Will show no infection signs and symptoms  7/7/2020 0247 by Paty Sheehan RN  Outcome: Ongoing

## 2020-07-07 NOTE — PROGRESS NOTES
Subjective: The patient complains of severe  acute on chronic progressive weakness secondary to anemia partially relieved by blood transfusions, rest, PT, OT and exacerbated by current decline in H&H. Patient has a lot of arthritis however he states that his pain is under good control with the Tylenol told him that if he has a flareup he can take the [address] but would rather him use Tylenol and icy hot heat and massage if possible. He did have blunt trauma to chest trauma and has a lot of reason for aches and pains. ROS x10: The patient also complains of severely impaired mobility and activities of daily living. Otherwise no new problems with vision, hearing, nose, mouth, throat, dermal, cardiovascular, GI, , pulmonary, musculoskeletal, psychiatric or neurological. See Rehab H&P on Rehab chart dated . Vital signs:  BP (!) 153/80   Pulse 80   Temp 97 °F (36.1 °C) (Oral)   Resp 20   Ht 6' (1.829 m)   Wt 163 lb 6.4 oz (74.1 kg)   SpO2 97%   BMI 22.16 kg/m²   I/O:   PO/Intake:  fair PO intake, no problems observed or reported. Bowel/Bladder:  continent, no problems noted. General:  Patient is well developed, adequately nourished, non-obese and     well kempt. HEENT:    PERRLA, hearing intact to loud voice, external inspection of ear     and nose benign. Inspection of lips, tongue and gums benign  Musculoskeletal: No significant change in strength or tone. All joints stable. Inspection and palpation of digits and nails show no clubbing,       cyanosis or inflammatory conditions. Neuro/Psychiatric: Affect: flat but pleasant. Alert and oriented to person, place and     situation. No significant change in deep tendon reflexes or     Sensation-poor insight into his deficits  Lungs:  Diminished, CTA-B. Respiration effort is normal at rest.     Heart:   S1 = S2, RRR. No loud murmurs. Abdomen:  Soft, non-tender, no enlargement of liver or spleen.   Extremities:  No significant INJURY TO THE CHEST AS DESCRIBED IN BODY OF REPORT. 2. MEDIASTINAL STRUCTURES AND THORACIC AORTA INTACT. NO MEDIASTINAL HEMATOMA. 3. LUNGS APPEAR EXPANDED AND CLEAR WITHOUT A PULMONARY CONTUSION OR PNEUMOTHORAX. 4. DEGENERATIVE BONE SPURRING IN THE THORACIC SPINE AND NO THORACIC SPINE FRACTURE. 5. RIBS APPEAR INTACT AND NO CT EVIDENCE OF A DISPLACED RIB FRACTURE. Ct Abdomen Pelvis  : 6/29/2020   1. SOFT TISSUE CONTUSION INVOLVING THE RIGHT POSTEROLATERAL ABDOMINAL WALL. 2. 10 X 5 X 10 CM RIGHT POSTERIOR ABDOMINAL WALL HEMATOMA WITH L2 CHIP FRACTURE. 3. NO INTRAPERITONEAL OR RETROPERITONEAL HEMORRHAGE OR HEMATOMA. 4. NO ABDOMINAL ORGAN TRAUMATIC INJURY OR ABDOMINAL ORGAN LACERATION. 5. DEGENERATIVE BONE CHANGES IN THE LUMBAR SPINE AND NO LUMBAR SPINE FRACTURE. 6. NO PELVIC FRACTURE AND ADDITIONAL INCIDENTAL FINDINGS IN BODY OF REPORT. Xr Chest   6/29/2020    Lungs and pleura:  No consolidation. No pleural effusion. No pneumothorax. Normal pulmonary vascular pattern. Cardiomediastinal silhouette:  Stable cardiomediastinal silhouette. Other:  No acute osseous findings. Remote fracture deformity left proximal humerus. No acute radiographic abnormality. Us Carotid Artery   6/29/2020   . 1. No areas of estimated luminal stenosis greater than 0-49 percent within the bilateral internal carotid arteries. . 2. Scattered areas of calcified and noncalcified plaque bilateral internal carotid arteries and bilateral carotid artery bulb/bifurcations.  GOSINK CRITERIA Diameter          PSV t            EDV t          PSV          EDV          Stenosis Site       %              cm/sec          cm/sec      ICA/CCA    ICA/CCA 0-49                 <124              <40             <2:1           ---                 --- 50-69              125-225                  >2:1           ---                 --- 70-89               225-325          >100          >4:1           >5:1              --- 90%+                >325 daily. In bed therapies or bedside therapies prn.   2. Bowel Parkinson's related constipation and Bladder dysfunction neurogenic bladder:  frequent toileting, ambulate to bathroom with assistance, check post void residuals. Check for C.difficile x1 if >2 loose stools in 24 hours, continue bowel & bladder program.  Monitor bowel and bladder function. Lactinex 2 PO every AC. MOM prn, Brown Bomb prn, Glycerin suppository prn, enema prn.  3. Severe mid and low back parkinsonian stiffness related pain as well as generalized OA pain: reassess pain every shift and prior to and after each therapy session, give prn Tylenol and low-dose Norco avoiding any NSAIDs, modalities prn in therapy, Lidoderm, K-pad prn. Titrate to lowest effective dose of Norco  4. Skin healing bilateral upper extremity bruising and breakdown risk:  continue pressure relief program.  Daily skin exams and reports from nursing. 5. Severe fatigue due to nutritional and hydration deficiency: Add vitamin B12 vitamin D and CoQ10 continue to monitor I&Os, calorie counts prn, dietary consult prn.  6. Acute episodic insomnia with situational adjustment disorder:  prn Ambien, monitor for day time sedation. 7. Falls risk elevated:  patient to use call light to get nursing assistance to get up, bed and chair alarm. 8. Elevated DVT risk: progressive activities in PT, continue prophylaxis BRIAN hose, elevation and avoid all blood thinners due to recent anemia need for transfusion and myelodysplasia. 9. Complex discharge planning: We will continue to focus on balance transitioning to lowest effective dose of any opiate medication independence and pain management as patient progresses toward discharge 7/12/2020 home with his wife and home health care.   He is status post weekly team meeting every Monday to assess progress towards goals, discuss and address social, psychological and medical comorbidities and to address difficulties they may be having progressing in therapy. Patient and family education is in progress. The patient is to follow-up with their family physician after discharge. Complex Active General Medical Issues that complicate care Assess & Plan:    1. Hypertension with   Orthostasis, Aortic valve stenosis, severe, LVH (left ventricular hypertrophy), Diastolic dysfunction-vital signs every shift dose and titrate cardiac medications to include ProAmatine consult hospitalist for backup medical-recheck CBC monitor closely for bleeding  2. Macular degeneration with   Blindness  right eye-usual cues to the right  3. BPH (benign prostatic hyperplasia)-check post void residuals and check recheck UA  4.   PD (Parkinson's disease) -monitor for orthostasis treat comorbidities including pain constipation rigid rigidity and cognitive decline consult neurology and titrate dopaminergic medications  5. MDS (myelodysplastic syndrome), low grade -add B12 recheck CBC add iron with food add Folvite transfuse as needed transfusion scheduled for 7/3/2020  6. Closed fracture of body of lumbar vertebra with active and chronic pain-add Lidoderm add high-dose vitamin D  7. 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 Severe primary orthostatic hypotension-check Ortho BPs titrate cardiac medications add abdominal binder and teds.         Daphnie Azul D.O., PM&R     Attending    [address]

## 2020-07-07 NOTE — PROGRESS NOTES
Physical Therapy Rehab Treatment Note  Facility/Department: [facility]  Room: U561/E472-04       NAME: Irina Forde  : 1940 (89 y.o.)  MRN: [id number]  CODE STATUS: Full Code    Date of Service: 2020  Chart Reviewed: Yes  Family / Caregiver Present: No    Restrictions:  Restrictions/Precautions: Fall Risk       SUBJECTIVE: Subjective: \"Im feel fine\"     Pre Treatment Pain Screening  Pain at present: 5  Scale Used: Numeric Score  Intervention List: Patient able to continue with treatment    Post Treatment Pain Screenin/10  Pain Assessment  Pain Location: Back  Pain Orientation: Lower    OBJECTIVE:               Neuromuscular Education  PNF: Standing forward/retro step over(cane) x 10. Gait Obstacle course focusing on proper step height/length. x 2  NDT Treatment: Standing  Neuromuscular Comments: Pt showed better control and coordination when stepping over obstacles. Pt did have a small LOB but ablet to correct safely. Bed mobility  Rolling to Left: Supervision  Rolling to Right: Supervision  Supine to Sit: Stand by assistance  Sit to Supine: Stand by assistance  Scooting: Stand by assistance    Transfers  Sit to Stand: Stand by assistance  Stand to sit: Stand by assistance  Car Transfer: Stand by assistance  Comment: Pt needed VC for proper technique to get in and out car transfer safely. Ambulation  Ambulation?: Yes  Ambulation 1  Surface: level tile;carpet  Device: Single point cane  Assistance: Stand by assistance  Quality of Gait: good ALEN, increased hip flexion when advancing LLE. Good heel strike and toe off with good stride length and height. Distance: 350' (Rehab Loop)  Comments: VC to make room for hallway traffic    Stairs/Curb  Stairs?: Yes  Stairs  # Steps : 12  Stairs Height: 6\"  Rails: Bilateral  Assistance: Stand by assistance  Comment: Pt able to ascend and descend stairs safely and recip without VC.           Activity Tolerance  Activity Tolerance: Patient Tolerated treatment well    Exercises  Hip Abduction: x20 YTB / x20 Ball squeezes   Knee Long Arc Quad: x20     ASSESSMENT/COMMENTS:  Body structures, Functions, Activity limitations: Decreased functional mobility ; Decreased strength;Decreased endurance;Decreased balance;Decreased coordination;Decreased safe awareness; Increased pain  Assessment: pt demonstrated better coordination and control when navigating obstacles during standing activities. Able to advance over objects recip without hesitation. Pt kirk to perform car transfer and stairs with stand by assistance needing only VC reminders for technique. PLAN OF CARE/Safety:   Safety Devices  Type of devices: All fall risk precautions in place      Therapy Time:   Individual   Time In 1300   Time Out 1400   Minutes 60     Minutes:60       Transfer/Bed mobility training: 10      Gait trainin      Neuro re education: 20     Therapeutic ex:10      Kirk Uribe 20 at 2:02 PM    This therapy session was supervised by Lizzeth Ervin PTA.

## 2020-07-07 NOTE — PROGRESS NOTES
Facility/Department: [facility]  Daily Treatment Note  NAME: Carolyn Francis  : 1940  MRN: [id number]    Date of Service: 2020    Discharge Recommendations:  Continue to assess pending progress       Assessment   Assessment: Pt. demonstrates improving endurance and balance as well as strength and coordination; pt. continues to benefit from OT to maximize independence and safety with ADL tasks. Activity Tolerance  Activity Tolerance: Patient Tolerated treatment well  Safety Devices  Safety Devices in place: Yes  Type of devices: All fall risk precautions in place         Patient Diagnosis(es): There were no encounter diagnoses. has a past medical history of Actinic keratoses, Aortic valve stenosis, severe, BPH (benign prostatic hyperplasia), Cancer ([facility] [address] Colon polyp, Diastolic dysfunction, ED (erectile dysfunction), Glaucoma, Hemochromatosis, History of echocardiogram, History of echocardiogram, Hypertension, Iron deficiency anemia, LVH (left ventricular hypertrophy), Macular degeneration, Mass on back, Osteoarthritis cervical spine, and Parkinson disease ([facility] [address] has a past surgical history that includes Colonoscopy (2009); back surgery; Cataract removal; eye surgery (Bilateral); Tonsillectomy; other surgical history (06/08/15); and Cardiac catheterization (2017). Restrictions  Restrictions/Precautions  Restrictions/Precautions: Fall Risk  Subjective   General  Chart Reviewed: Yes  Patient assessed for rehabilitation services?: Yes  Response to previous treatment: Patient with no complaints from previous session  Family / Caregiver Present: Yes(Wife)  Referring Practitioner: Dr. Blanca Mustafa  Diagnosis: Impaired mobility and ADLs 2° to severe parkinson's disease with blunt chest trauma  Subjective  Subjective: I'll show you the wound. Pain Assessment  Pain Assessment: 0-10  Pain Level: 6  Pain Type: Acute pain  Pain Location: Back  Pain Orientation: Right; Lower  Pain Descriptors: Sore  Pre Treatment Pain Screening  Pain at present: 6  Scale Used: Numeric Score  Intervention List: Patient able to continue with treatment;Patient declined any intervention  Comments / Details: Pt. states \"It's not that bad\"  Vital Signs  Patient Currently in Pain: Yes   Orientation   WFL  Objective      Pt. arrives to OT c/o being very tired. Pt. requests seated tasks due to fatigue. 1lb wrist weights donned to B wrists throughout 55 minutes in OT department to improve UE endurance. Pt. was able to sort washers onto dowels by size with min difficulty; pt. required 1 cue to notice an error and max increased time to place/remove rings from dowels. Pt. then was able to play game of 'Topple' with min difficulty. Pt. had difficulty recognizing how high to lift his hand in order not to knock over the board, as well as one cue to be sure to keep the board balanced. Pt has max difficulty recalling which level was which while placing pieces according to die roll. Pt. placed clothespins overhead on yardstick with each hand with no difficulty. Pt. demonstrates improving overall coordination with this reaching task. Pt. ambulated to bathroom in his room on return to his room with supervision and mild LOB which he is able to self correct with use of cane. ADL  Toileting: Supervision(Stands to urinate)  Additional Comments: Prior to returning to his high back chair, pt. requests to toilet. Ambulates to bathroom and stands with grab bar to toilet and to wash hands.        Plan   Plan  Times per week: 5-7x  Plan weeks: 1.5 weeks  Current Treatment Recommendations: Strengthening, Functional Mobility Training, Patient/Caregiver Education & Training, Home Management Training, Cognitive/Perceptual Training, Pain Management, Balance Training, Neuromuscular Re-education, Self-Care / ADL, Safety Education & Training, Endurance Training  Plan Comment: Continue per OT POC  G-Code  OutComes Score  AM-PAC Score  Goals  Patient Goals Patient goals : \"I want to get back to independence\"       Therapy Time   Individual Concurrent Group Co-treatment   Time In 1400         Time Out 1500         Minutes 60           ADL trainin minutes  Therapeutic activities: 52 minutes      Electronically signed by LYSSA Pimentel/MARCIA on 2020 at 4:02 PM    LYSSA Pimentel/MARCIA

## 2020-07-07 NOTE — PLAN OF CARE
Problem: Falls - Risk of:  Goal: Will remain free from falls  Description: Will remain free from falls  7/7/2020 1111 by Clovis Garcia RN  Outcome: Ongoing  7/7/2020 0247 by Rashard Carpenter RN  Outcome: Ongoing  Goal: Absence of physical injury  Description: Absence of physical injury  7/7/2020 1111 by Clovis Garcia RN  Outcome: Ongoing  7/7/2020 0247 by Rashard Carpenter RN  Outcome: Ongoing     Problem: Skin Integrity:  Goal: Will show no infection signs and symptoms  Description: Will show no infection signs and symptoms  7/7/2020 1111 by Clovis Garcia RN  Outcome: Ongoing  7/7/2020 0247 by Rashard Carpenter RN  Outcome: Ongoing  Goal: Absence of new skin breakdown  Description: Absence of new skin breakdown  Outcome: Ongoing     Problem: Mobility - Impaired:  Goal: Mobility will improve  Description: Mobility will improve  Outcome: Ongoing     Problem: IP COMMUNICATION/DYSARTHRIA  Goal: LTG - patient will improve expressive language skills to allow for communication of wants and needs in daily activities  Outcome: Ongoing  Goal: LTG - patient will utilize compensatory intervention for intelligibility  Outcome: Ongoing     Problem: IP SWALLOWING  Goal: LTG - patient will tolerate the least restrictive diet consistency to allow for safe consumption of daily meals  Outcome: Ongoing     Problem: IP BALANCE  Goal: LTG - Patient will maintain balance to allow for safe/functional mobility  Outcome: Ongoing     Problem: Pain:  Goal: Pain level will decrease  Description: Pain level will decrease  Outcome: Ongoing  Goal: Control of acute pain  Description: Control of acute pain  Outcome: Ongoing  Goal: Control of chronic pain  Description: Control of chronic pain  Outcome: Ongoing

## 2020-07-07 NOTE — PROGRESS NOTES
Physical Therapy Rehab Treatment Note  [facility]  Room: O962/G307-09       NAME: Zeinab Fortune  : 1940 (34 y.o.)  MRN: [id number]  CODE STATUS: Full Code    Date of Service: 2020  Chart Reviewed: Yes  Family / Caregiver Present: No    Restrictions:  Restrictions/Precautions: Fall Risk       SUBJECTIVE: Subjective: \"Im feel fine\"     Pre Treatment Pain Screening  Pain at present: 0  Scale Used: Numeric Score    Post Treatment Pain Screenin/10  Pain Assessment  Pain Location: Back  Pain Orientation: Lower    OBJECTIVE:               Neuromuscular Education  PNF: Standing balance with frontal plane punches. x 2 minutes, Walking over obstacles(50ft total) x 2, Standing march x 20   NDT Treatment: Standing  Neuromuscular Comments: Pt displays good balance when performing dynamic standing activities that require reaching forward. Pt had difficulty with stepping over objects in regards to foot placement. Pt demonstrates good foot clearance but reduced step length. Pt had one LOB but able to self correct and continue with activity. Transfers  Sit to Stand: Stand by assistance  Stand to sit: Stand by assistance    Ambulation  Ambulation?: Yes  Ambulation 1  Surface: level tile;carpet;ramp  Device: Single point cane  Assistance: Stand by assistance  Distance: 275'               Activity Tolerance  Activity Tolerance: Patient Tolerated treatment well          ASSESSMENT/COMMENTS:  Body structures, Functions, Activity limitations: Decreased functional mobility ; Decreased strength;Decreased endurance;Decreased balance;Decreased coordination;Decreased safe awareness; Increased pain  Assessment: Pt tolerated treatment well and demonstrated good balance when performing standing activites. Pt did have one LOB when stepping over objects but was pamella to self correct and continue wit activity. Pt showed an increase in awareness when around traffic in therapy room.      PLAN OF CARE/Safety: Safety Devices  Type of devices: All fall risk precautions in place      Therapy Time:   Individual   Time In 1130   Time Out 1200   Minutes 30     Minutes:30      Gait trainin      Neuro re education: [id number], 20 at 12:12 PM    This therapy session was supervised by Carla Charlton PTA.

## 2020-07-08 LAB
BILIRUBIN URINE: NEGATIVE
BLOOD, URINE: NEGATIVE
CLARITY: CLEAR
COLOR: YELLOW
GLUCOSE URINE: NEGATIVE MG/DL
KETONES, URINE: ABNORMAL MG/DL
LEUKOCYTE ESTERASE, URINE: NEGATIVE
NITRITE, URINE: NEGATIVE
ORGANISM: ABNORMAL
PH UA: 6 (ref 5–9)
PROTEIN UA: NEGATIVE MG/DL
SPECIFIC GRAVITY UA: 1.01 (ref 1–1.03)
URINE CULTURE, ROUTINE: ABNORMAL
URINE REFLEX TO CULTURE: ABNORMAL
UROBILINOGEN, URINE: 0.2 E.U./DL

## 2020-07-08 PROCEDURE — 81003 URINALYSIS AUTO W/O SCOPE: CPT

## 2020-07-08 PROCEDURE — 99222 1ST HOSP IP/OBS MODERATE 55: CPT | Performed by: UROLOGY

## 2020-07-08 PROCEDURE — 99232 SBSQ HOSP IP/OBS MODERATE 35: CPT | Performed by: PHYSICAL MEDICINE & REHABILITATION

## 2020-07-08 PROCEDURE — 97110 THERAPEUTIC EXERCISES: CPT

## 2020-07-08 PROCEDURE — 99233 SBSQ HOSP IP/OBS HIGH 50: CPT | Performed by: PSYCHIATRY & NEUROLOGY

## 2020-07-08 PROCEDURE — 51798 US URINE CAPACITY MEASURE: CPT

## 2020-07-08 PROCEDURE — 97535 SELF CARE MNGMENT TRAINING: CPT

## 2020-07-08 PROCEDURE — 1180000000 HC REHAB R&B

## 2020-07-08 PROCEDURE — 6370000000 HC RX 637 (ALT 250 FOR IP): Performed by: INTERNAL MEDICINE

## 2020-07-08 PROCEDURE — 97116 GAIT TRAINING THERAPY: CPT

## 2020-07-08 PROCEDURE — 6370000000 HC RX 637 (ALT 250 FOR IP): Performed by: PHYSICAL MEDICINE & REHABILITATION

## 2020-07-08 PROCEDURE — 97112 NEUROMUSCULAR REEDUCATION: CPT

## 2020-07-08 PROCEDURE — APPSS15 APP SPLIT SHARED TIME 0-15 MINUTES: Performed by: NURSE PRACTITIONER

## 2020-07-08 PROCEDURE — 97530 THERAPEUTIC ACTIVITIES: CPT

## 2020-07-08 RX ORDER — L. ACIDOPHILUS/L.BULGARICUS 1MM CELL
2 TABLET ORAL 3 TIMES DAILY
Status: DISCONTINUED | OUTPATIENT
Start: 2020-07-08 | End: 2020-07-12 | Stop reason: HOSPADM

## 2020-07-08 RX ORDER — CIPROFLOXACIN 500 MG/1
500 TABLET, FILM COATED ORAL EVERY 12 HOURS SCHEDULED
Status: COMPLETED | OUTPATIENT
Start: 2020-07-08 | End: 2020-07-10

## 2020-07-08 RX ADMIN — CARBIDOPA AND LEVODOPA 1 TABLET: 25; 100 TABLET ORAL at 09:02

## 2020-07-08 RX ADMIN — NYSTATIN 500000 UNITS: 100000 SUSPENSION ORAL at 15:19

## 2020-07-08 RX ADMIN — NYSTATIN 500000 UNITS: 100000 SUSPENSION ORAL at 09:03

## 2020-07-08 RX ADMIN — BRIMONIDINE TARTRATE 1 DROP: 2 SOLUTION OPHTHALMIC at 20:18

## 2020-07-08 RX ADMIN — MIDODRINE HYDROCHLORIDE 5 MG: 5 TABLET ORAL at 12:42

## 2020-07-08 RX ADMIN — FOLIC ACID 1000 MCG: 1 TABLET ORAL at 09:02

## 2020-07-08 RX ADMIN — CARBIDOPA AND LEVODOPA 1 TABLET: 25; 100 TABLET ORAL at 15:19

## 2020-07-08 RX ADMIN — VITAMIN D, TAB 1000IU (100/BT) 2000 UNITS: 25 TAB at 17:33

## 2020-07-08 RX ADMIN — MENTHOL AND METHYL SALICYLATE: 7.6; 29 OINTMENT TOPICAL at 15:22

## 2020-07-08 RX ADMIN — CIPROFLOXACIN 500 MG: 500 TABLET, FILM COATED ORAL at 10:57

## 2020-07-08 RX ADMIN — MIDODRINE HYDROCHLORIDE 5 MG: 5 TABLET ORAL at 09:02

## 2020-07-08 RX ADMIN — CYANOCOBALAMIN TAB 500 MCG 1000 MCG: 500 TAB at 09:02

## 2020-07-08 RX ADMIN — HYDROCODONE BITARTRATE AND ACETAMINOPHEN 1 TABLET: 5; 325 TABLET ORAL at 20:14

## 2020-07-08 RX ADMIN — CARBIDOPA AND LEVODOPA 1 TABLET: 25; 100 TABLET ORAL at 20:15

## 2020-07-08 RX ADMIN — BRIMONIDINE TARTRATE 1 DROP: 2 SOLUTION OPHTHALMIC at 09:04

## 2020-07-08 RX ADMIN — LATANOPROST 1 DROP: 50 SOLUTION OPHTHALMIC at 20:18

## 2020-07-08 RX ADMIN — CIPROFLOXACIN 500 MG: 500 TABLET, FILM COATED ORAL at 20:15

## 2020-07-08 RX ADMIN — Medication 100 MG: at 17:35

## 2020-07-08 RX ADMIN — MENTHOL AND METHYL SALICYLATE: 7.6; 29 OINTMENT TOPICAL at 20:18

## 2020-07-08 RX ADMIN — LACTOBACILLUS TAB 2 TABLET: TAB at 20:15

## 2020-07-08 RX ADMIN — FERROUS SULFATE TAB 325 MG (65 MG ELEMENTAL FE) 325 MG: 325 (65 FE) TAB at 17:32

## 2020-07-08 RX ADMIN — NYSTATIN 500000 UNITS: 100000 SUSPENSION ORAL at 20:15

## 2020-07-08 RX ADMIN — MIDODRINE HYDROCHLORIDE 5 MG: 5 TABLET ORAL at 17:32

## 2020-07-08 RX ADMIN — Medication 100 MG: at 06:03

## 2020-07-08 RX ADMIN — LACTOBACILLUS TAB 2 TABLET: TAB at 15:19

## 2020-07-08 RX ADMIN — LACTOBACILLUS TAB 2 TABLET: TAB at 10:57

## 2020-07-08 RX ADMIN — FERROUS SULFATE TAB 325 MG (65 MG ELEMENTAL FE) 325 MG: 325 (65 FE) TAB at 09:02

## 2020-07-08 RX ADMIN — BISACODYL 5 MG: 5 TABLET, COATED ORAL at 20:16

## 2020-07-08 ASSESSMENT — PAIN DESCRIPTION - PAIN TYPE
TYPE: ACUTE PAIN

## 2020-07-08 ASSESSMENT — ENCOUNTER SYMPTOMS
VOMITING: 0
COUGH: 0
NAUSEA: 0
CHEST TIGHTNESS: 0
SHORTNESS OF BREATH: 0
TROUBLE SWALLOWING: 0
WHEEZING: 0
COLOR CHANGE: 0

## 2020-07-08 ASSESSMENT — PAIN SCALES - GENERAL
PAINLEVEL_OUTOF10: 5
PAINLEVEL_OUTOF10: 4
PAINLEVEL_OUTOF10: 5
PAINLEVEL_OUTOF10: 4
PAINLEVEL_OUTOF10: 6

## 2020-07-08 ASSESSMENT — PAIN DESCRIPTION - LOCATION
LOCATION: BACK

## 2020-07-08 ASSESSMENT — PAIN DESCRIPTION - ORIENTATION
ORIENTATION: RIGHT;LOWER
ORIENTATION: LOWER;RIGHT
ORIENTATION: LOWER;RIGHT
ORIENTATION: RIGHT;LOWER

## 2020-07-08 ASSESSMENT — PAIN DESCRIPTION - DESCRIPTORS
DESCRIPTORS: SORE

## 2020-07-08 NOTE — PROGRESS NOTES
Subjective: The patient complains of severe  acute on chronic progressive weakness secondary to anemia partially relieved by blood transfusions, rest, PT, OT and exacerbated by current decline in H&H. I am concerned of his poor sleep impulsivity and urinary urgency likely fueled by UTI. He is also taken off his Flomax recently possibly due to blood pressure issues likely leading to retention and UTI and now a decline in his function. Indeed review of the chart showed he had a low BP we have prescribed abdominal binder and teds and the Flomax had been held. Enterococcus faecalis (1)     Antibiotic Interpretation CEE Status   ampicillin Sensitive <=2 mcg/mL    ciprofloxacin Sensitive 1 mcg/mL    nitrofurantoin Sensitive <=16 mcg/mL    vancomycin Sensitive 2 mcg/mL          ROS x10: The patient also complains of severely impaired mobility and activities of daily living. Otherwise no new problems with vision, hearing, nose, mouth, throat, dermal, cardiovascular, GI, , pulmonary, musculoskeletal, psychiatric or neurological. See Rehab H&P on Rehab chart dated . Vital signs:  BP (!) 140/80   Pulse 71   Temp 98 °F (36.7 °C) (Oral)   Resp 17   Ht 6' (1.829 m)   Wt 163 lb 6.4 oz (74.1 kg)   SpO2 96%   BMI 22.16 kg/m²   I/O:   PO/Intake:  fair PO intake, no problems observed or reported. Bowel/Bladder:  continent, urinary urgency and acute UTI-history of retention  General:  Patient is well developed, adequately nourished, non-obese and     well kempt. HEENT:    PERRLA, hearing intact to loud voice, external inspection of ear     and nose benign. Inspection of lips, tongue and gums benign  Musculoskeletal: No significant change in strength or tone. All joints stable. Inspection and palpation of digits and nails show no clubbing,       cyanosis or inflammatory conditions. Neuro/Psychiatric: Affect: flat but pleasant. Alert and oriented to person, place and     situation.   No significant change in deep tendon reflexes or     Sensation-poor insight into his deficits  Lungs:  Diminished, CTA-B. Respiration effort is normal at rest.     Heart:   S1 = S2, RRR. No loud murmurs. Abdomen:  Soft, non-tender, no enlargement of liver or spleen. Extremities:  No significant lower extremity edema or tenderness. Skin:   Intact to general survey, no visualized or palpated problems-chest bruising. Rehabilitation:  Physical therapy: FIMS:  Bed Mobility: Scooting: Stand by assistance    Transfers: Sit to Stand: Stand by assistance  Stand to sit: Stand by assistance  Bed to Chair: Contact guard assistance, Ambulation 1  Surface: level tile, carpet  Device: Single point cane  Assistance: Stand by assistance  Quality of Gait: good ALEN, increased hip flexion when advancing LLE. Good heel strike and toe off with good stride length and height. Gait Deviations: Slow Renetta, Increased ALEN, Decreased step length  Distance: 350' (Rehab Loop)  Comments: VC to make room for hallway traffic, Stairs  # Steps : 12  Stairs Height: 6\"  Rails: Bilateral  Assistance: Stand by assistance  Comment: Pt able to ascend and descend stairs safely and recip without VC. FIMS:  ,  , Assessment: pt demonstrated better coordination and control when navigating obstacles during standing activities. Able to advance over objects recip without hesitation. Pt pamella to perform car transfer and stairs with stand by assistance needing only VC reminders for technique. Occupational therapy: FIMS:   ,  , Assessment: Pt. demonstrates improving endurance and balance as well as strength and coordination; pt. continues to benefit from OT to maximize independence and safety with ADL tasks.      Speech therapy: FIMS:        Lab/X-ray studies reviewed, analyzed and discussed with patient and staff:   Recent Results (from the past 24 hour(s))   URINE RT REFLEX TO CULTURE    Collection Time: 07/08/20  7:00 AM   Result Value Ref Range Color, UA Yellow Straw/Yellow    Clarity, UA Clear Clear    Glucose, Ur Negative Negative mg/dL    Bilirubin Urine Negative Negative    Ketones, Urine TRACE (A) Negative mg/dL    Specific Gravity, UA 1.007 1.005 - 1.030    Blood, Urine Negative Negative    pH, UA 6.0 5.0 - 9.0    Protein, UA Negative Negative mg/dL    Urobilinogen, Urine 0.2 <2.0 E.U./dL    Nitrite, Urine Negative Negative    Leukocyte Esterase, Urine Negative Negative    Urine Reflex to Culture Not Indicated        Xr Lumbar Spine   6/29/2020    Nonspecific bowel gas pattern. Vascular calcifications noted abdominal aorta. Normal sagittal alignment lumbar spine. Possible fracture versus osteophyte anterior superior corner of L2. Moderate facet osteoarthropathy L3-S1. Moderate degenerative disc disease L5-S1. No Pars interarticularis fracture defect. Impression:  1. Possible chip fracture versus osteophyte anterior superior corner of L2, further evaluation with MRI of the lumbar spine recommended. 2. Moderate facet osteoarthropathy L3-S1 with moderate degenerative disc disease L5-S1. 3. Vascular calcifications abdominal aorta. Ct Head   6/29/2020    Vascular calcifications within the bilateral internal carotid artery cavernous segments and the vertebral arteries. . Moderate to moderately severe cerebral volume loss/atrophy. No subfalcine, transtentorial or tonsillar herniation or shift. No intraparenchymal hemorrhage. No extra-axial fluid collection or hematoma. Hypodensities bilateral centrum semiovale and periventricular regions. Empty sella. No Chiari malformation. Mucosal disease left maxillary sinus. No acute fracture or lytic or blastic bony lesion. Postoperative changes left orbit/optic globe. Airways and paranasal sinuses grossly unremarkable. 1. No CT evidence of acute intracranial abnormality, as questioned.  2. Moderate to moderately severe cerebral volume loss/atrophy with white matter changes consistent with sequelae small vessel ischemic disease. . 3. Vascular calcifications within the bilateral internal carotid artery cavernous segments and the vertebral arteries. . 4. Empty sella. Ct Chest   6/29/2020   1. NO ACUTE TRAUMATIC INJURY TO THE CHEST AS DESCRIBED IN BODY OF REPORT. 2. MEDIASTINAL STRUCTURES AND THORACIC AORTA INTACT. NO MEDIASTINAL HEMATOMA. 3. LUNGS APPEAR EXPANDED AND CLEAR WITHOUT A PULMONARY CONTUSION OR PNEUMOTHORAX. 4. DEGENERATIVE BONE SPURRING IN THE THORACIC SPINE AND NO THORACIC SPINE FRACTURE. 5. RIBS APPEAR INTACT AND NO CT EVIDENCE OF A DISPLACED RIB FRACTURE. Ct Abdomen Pelvis  : 6/29/2020   1. SOFT TISSUE CONTUSION INVOLVING THE RIGHT POSTEROLATERAL ABDOMINAL WALL. 2. 10 X 5 X 10 CM RIGHT POSTERIOR ABDOMINAL WALL HEMATOMA WITH L2 CHIP FRACTURE. 3. NO INTRAPERITONEAL OR RETROPERITONEAL HEMORRHAGE OR HEMATOMA. 4. NO ABDOMINAL ORGAN TRAUMATIC INJURY OR ABDOMINAL ORGAN LACERATION. 5. DEGENERATIVE BONE CHANGES IN THE LUMBAR SPINE AND NO LUMBAR SPINE FRACTURE. 6. NO PELVIC FRACTURE AND ADDITIONAL INCIDENTAL FINDINGS IN BODY OF REPORT. Xr Chest   6/29/2020    Lungs and pleura:  No consolidation. No pleural effusion. No pneumothorax. Normal pulmonary vascular pattern. Cardiomediastinal silhouette:  Stable cardiomediastinal silhouette. Other:  No acute osseous findings. Remote fracture deformity left proximal humerus. No acute radiographic abnormality. Us Carotid Artery   6/29/2020   . 1. No areas of estimated luminal stenosis greater than 0-49 percent within the bilateral internal carotid arteries. . 2. Scattered areas of calcified and noncalcified plaque bilateral internal carotid arteries and bilateral carotid artery bulb/bifurcations.  GOSINK CRITERIA Diameter          PSV t            EDV t          PSV          EDV          Stenosis Site       %              cm/sec          cm/sec      ICA/CCA    ICA/CCA 0-49                 <124              <40             <2:1           --- Recurrent UTIs due to urinary retention and intolerance to Flomax at times because of low BP.  3. Severe mid and low back parkinsonian stiffness related pain as well as generalized OA pain: reassess pain every shift and prior to and after each therapy session, give prn Tylenol and low-dose Norco avoiding any NSAIDs, modalities prn in therapy, Lidoderm, K-pad prn. Titrate to lowest effective dose of Norco  4. Skin healing bilateral upper extremity bruising and breakdown risk:  continue pressure relief program.  Daily skin exams and reports from nursing. 5. Severe fatigue due to nutritional and hydration deficiency: Add vitamin B12 vitamin D and CoQ10 continue to monitor I&Os, calorie counts prn, dietary consult prn.  6. Acute episodic insomnia with situational adjustment disorder:  prn Ambien, monitor for day time sedation. 7. Falls risk elevated:  patient to use call light to get nursing assistance to get up, bed and chair alarm. 8. Elevated DVT risk: progressive activities in PT, continue prophylaxis BRIAN hose, elevation and avoid all blood thinners due to recent anemia need for transfusion and myelodysplasia. 9. Complex discharge planning: We will continue to focus on balance transitioning to lowest effective dose of any opiate medication independence and pain management as patient progresses toward discharge 7/12/2020 home with his wife and home health care. He is status post weekly team meeting every Monday to assess progress towards goals, discuss and address social, psychological and medical comorbidities and to address difficulties they may be having progressing in therapy. Patient and family education is in progress. The patient is to follow-up with their family physician after discharge. Complex Active General Medical Issues that complicate care Assess & Plan:    1.    Hypertension with   Orthostasis, Aortic valve stenosis, severe, LVH (left ventricular hypertrophy), Diastolic dysfunction-vital signs every shift dose and titrate cardiac medications to include ProAmatine consult hospitalist for backup medical-recheck CBC monitor closely for bleeding  2. Macular degeneration with   Blindness  right eye-usual cues to the right  3. BPH (benign prostatic hyperplasia)-check post void residuals and check recheck UA  4.   PD (Parkinson's disease) -monitor for orthostasis treat comorbidities including pain constipation rigid rigidity and cognitive decline consult neurology and titrate dopaminergic medications  5. MDS (myelodysplastic syndrome), low grade -add B12 recheck CBC add iron with food add Folvite transfuse as needed transfusion scheduled for 7/3/2020  6. Closed fracture of body of lumbar vertebra with active and chronic pain-add Lidoderm add high-dose vitamin D  7. 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 Severe primary orthostatic hypotension-check Ortho BPs titrate cardiac medications add abdominal binder and teds. Consider blood pressure adjustments and resuming Flomax if possible  9. Acute UTI-trial Cipro versus Bactrim versus Macrobid check urinary culture and Sensitivity Place, Mckeon catheter if retention greater than 300 resume Flomax when possible due to blood pressure issues.   Toilet frequently        Solomon Hartley D.O., PM&R     Attending    [address]

## 2020-07-08 NOTE — PROGRESS NOTES
Patient was using his call light through out the night to make his needs known. He still can be impulsive mostly d/t urinary urgency and frequency. UA obtained and was negative. Patient does not need to be left alone in the bathroom because he does not use the bathroom call light. Ambulates with cane and gait is unsteady.

## 2020-07-08 NOTE — CONSULTS
Yamilex Avery [address] [place] [address], [id number] [facility]                                  CONSULTATION    PATIENT NAME: Mitzi Mills                    :        1940  MED REC NO:   [id number]                            ROOM:       R248  ACCOUNT NO:   [de-identified]                           ADMIT DATE: 2020  PROVIDER:     Bryan Talley MD    CONSULT DATE:  2020    INPATIENT CONSULTATION    PERSON REQUESTING CONSULT:  Dr. Yazmin Tapia. REASON FOR CONSULTATION:  UTI; urinary frequency, urgency; and  hypotension. HISTORY OF PRESENT ILLNESS:  An 69-year-old male who was admitted  through the emergency room on 2020 for complaints of feeling faint  and dizzy and he fell hitting his right posterior ribs and flank on a  dresser. He had no prior symptoms. He was found to have a orthostatic  hypotension and dehydration and felt that some of the hypotension was  related to autonomic dysfunction related to his Parkinson's disease,  also had an abdominal wall hematoma from the fall and an L2 chip  fracture. He was managed as an inpatient and sent for rehabilitation. Urologic consultation was requested because the patient has developed  worsened frequency and urgency since admission. This has happened over  the last week or so and was found to have a UTI greater than 100,000  colonies of enterococcus and urologic consultation was requested for our  opinion with respect to possible use of medications as the patient  remains significantly hypotensive and the feeling was that Flomax would  be contraindicated in this setting. The patient reports he has baseline  frequency and urgency due to his Parkinson's disease, but it has not  been that bothersome until the last week or so. He denies dysuria,  hematuria. He has no abdominal or flank pain. He has no fevers, no  chills.   He reports he has a good force to his urinary stream, but voids  multiple times during the day and night with urgency. He has no prior  urologic surgical history or other current urologic complaints. PAST MEDICAL HISTORY:  Includes aortic valve stenosis, BPH, skin cancer,  erectile dysfunction, glaucoma, hypertension, left ventricular  hypertrophy, macular degeneration, osteoarthritis, Parkinson's disease. PAST SURGICAL HISTORY:  Includes back surgery, cardiac cath, he has had  cataracts, tonsillectomy. FAMILY HISTORY:  There is no history of genitourinary malignancies in  the family. SOCIAL HISTORY:  He denies cigarette smoking. HOME MEDICATIONS:  Include vitamin D3, Accupril, B12, Cosopt, Folvite,  Sinemet, Diovan, he had been on Flomax by his primary medical doctor in  2018, fish oil, and Lumigan. ALLERGIES:  He has allergies to CAT HAIR EXTRACT. REVIEW OF SYSTEMS:  He denies chest pain, shortness of breath, diarrhea,  otherwise negative and noncontributory unless as outlined in the history  of present illness. PHYSICAL EXAMINATION:  GENERAL:  He was a well-developed male sitting in a chair, eating lunch,  in no obvious acute distress. VITAL SIGNS:  Temperature 36.7, heart rate 71, respiratory rate 16,  blood pressure 126/67. HEENT:  Atraumatic, normocephalic. His eyes showed normal conjunctivae. CHEST:  His lungs were clear to auscultation anteriorly without rhonchi,  wheezing or rales. He had good breath sounds. CARDIOVASCULAR:  His heart was regular rate and rhythm without any  murmurs. ABDOMEN:  Soft, nondistended, nontender. He had no palpable  organomegaly. He had no palpable abdominal hernias. He had no CVA  tenderness. GENITOURINARY:  Exam revealed a normal circumcised male phallus. He had  no scrotal abnormalities. His testes were downgoing bilaterally without  masses. EXTREMITIES:  Showed no cyanosis or edema. NEUROLOGIC:  He was alert and oriented. PSYCHIATRIC:  He had normal affect.     LABORATORY DATA:  He had a urinalysis done today patient.         Fadumo Dunham MD    D: 07/08/2020 13:15:45       T: 07/08/2020 13:22:52     [email or web address]  Job#: [id number]     Doc#: [id number]    CC:  Todd Gallagher MD

## 2020-07-08 NOTE — PLAN OF CARE
Problem: Falls - Risk of:  Goal: Will remain free from falls  Description: Will remain free from falls  7/8/2020 0054 by Jenny Brito RN  Outcome: Ongoing     Problem: Falls - Risk of:  Goal: Absence of physical injury  Description: Absence of physical injury  7/8/2020 0054 by Jenny Brito RN  Outcome: Ongoing     Problem: Skin Integrity:  Goal: Will show no infection signs and symptoms  Description: Will show no infection signs and symptoms  7/8/2020 0054 by Jenny Brito RN  Outcome: Ongoing

## 2020-07-08 NOTE — PROGRESS NOTES
slightly dizzy upon standing. Pt SBA for gait training and required VC to correct standing technique from chair. Pt had difficulty with standing tandem balance when right foot is forward. PLAN OF CARE/Safety:   Safety Devices  Type of devices: All fall risk precautions in place      Therapy Time:   Individual   Time In 1300   Time Out 1400   Minutes 60     Minutes:60      Transfer/Bed mobility training:      Gait trainin      Neuro re education: 10     Therapeutic ex: Ciaran Tiwari 20 at 4:38 PM    This therapy session was supervised by Bryan Oviedo PTA.

## 2020-07-08 NOTE — PROGRESS NOTES
C/o dizziness and shortness of breath while doing therapy. BP 76/52, HR 86. Assisted pt to bed. Relates to poor sleep last night.

## 2020-07-08 NOTE — PROGRESS NOTES
Occupational Therapy  Facility/Department: [facility]  Daily Treatment Note  NAME: Michael Chapa  : 1940  MRN: [id number]    Date of Service: 2020    Discharge Recommendations:  Continue to assess pending progress       Assessment                Patient Diagnosis(es): There were no encounter diagnoses. has a past medical history of Actinic keratoses, Aortic valve stenosis, severe, BPH (benign prostatic hyperplasia), Cancer ([facility] [address] Colon polyp, Diastolic dysfunction, ED (erectile dysfunction), Glaucoma, Hemochromatosis, History of echocardiogram, History of echocardiogram, Hypertension, Iron deficiency anemia, LVH (left ventricular hypertrophy), Macular degeneration, Mass on back, Osteoarthritis cervical spine, and Parkinson disease ([facility] [address] has a past surgical history that includes Colonoscopy (2009); back surgery; Cataract removal; eye surgery (Bilateral); Tonsillectomy; other surgical history (06/08/15); and Cardiac catheterization (2017). Restrictions  Restrictions/Precautions  Restrictions/Precautions: Fall Risk  Subjective   General  Chart Reviewed: Yes  Patient assessed for rehabilitation services?: Yes  Response to previous treatment: Patient with no complaints from previous session  Family / Caregiver Present: Yes(Wife)  Referring Practitioner: Dr. Deann Payan  Diagnosis: Impaired mobility and ADLs 2° to severe parkinson's disease with blunt chest trauma  Subjective  Subjective: I'll show you the wound. Pain Assessment  Pain Level: 5  Pre Treatment Pain Screening  Pain at present: 5  Scale Used: Numeric Score  Intervention List: Patient declined any intervention  Vital Signs  BP: (!) 68/47  BP Location: Right upper arm  Patient Currently in Pain: Yes   Orientation     Objective    Pt seen in therapy gym to participate in functional UE activities for increased independence and endurance with ADLs.     Upper Extremity Function  UE Strengthing: Functional B UE strengthening- pt was able to tie ribbon onto horizontal dowel with rest breaks and fatigue. Pt reported not feeling right and increased pain in neck. Pt reported that he was not feeling well with increased neck pain, \"I just don't feel right\". BP taken on R UE- BP low and taken back to [facility] station to have repeat BP. Pt repeated low BP and pt placed back into bed. He required CGA to transfer from w/c to bed- pt samantha.      Plan   Plan  Times per week: 5-7x  Plan weeks: 1.5 weeks  Current Treatment Recommendations: Strengthening, Functional Mobility Training, Patient/Caregiver Education & Training, Home Management Training, Cognitive/Perceptual Training, Pain Management, Balance Training, Neuromuscular Re-education, Self-Care / ADL, Safety Education & Training, Endurance Training  Plan Comment: Continue per OT POC    Goals  Patient Goals   Patient goals : \"I want to get back to independence\"       Therapy Time   Individual Concurrent Group Co-treatment   Time In 0930         Time Out 1000         Minutes 30              Therapeutic activities: 30 minutes      LYSSA Pugh/L Electronically signed by LYSSA Pugh/L on 7/8/2020 at 11:57 AM

## 2020-07-08 NOTE — PROGRESS NOTES
Physical Therapy Missed Treatment   Facility/Department: [facility] D677/[id number]    NAME: Michell VillarB: 1940 ([de-identified] y.o.)  MRN: [id number]    Account: [de-identified]  Gender: male      Pt unable to been seen at this time, pt currently having low BP issues. Per discussion with nursing, pt to hold at this time and will re-check for appropriate levels for therapy in PM.  Missed Tx Time: 30 min.      Sameera Sam  20 at 11:39 AM

## 2020-07-08 NOTE — PROGRESS NOTES
Facility/Department: [facility]  Daily Treatment Note  NAME: Cheryl Hill  : 1940  MRN: [id number]    Date of Service: 2020    Discharge Recommendations:  Continue to assess pending progress       Assessment   Assessment: Pt. attends well to activities and is able to perform standing and seated tasks with PRN appropriate rest breaks. Pt. continues to be limited by overall fatigue and weakness. Pt. continues to benefit from OT to maximize independence and safety with ADL tasks. Activity Tolerance  Activity Tolerance: Patient Tolerated treatment well  Safety Devices  Safety Devices in place: Yes  Type of devices: All fall risk precautions in place         Patient Diagnosis(es): There were no encounter diagnoses. has a past medical history of Actinic keratoses, Aortic valve stenosis, severe, BPH (benign prostatic hyperplasia), Cancer ([facility] [address] Colon polyp, Diastolic dysfunction, ED (erectile dysfunction), Glaucoma, Hemochromatosis, History of echocardiogram, History of echocardiogram, Hypertension, Iron deficiency anemia, LVH (left ventricular hypertrophy), Macular degeneration, Mass on back, Osteoarthritis cervical spine, and Parkinson disease ([facility] [address] has a past surgical history that includes Colonoscopy (2009); back surgery; Cataract removal; eye surgery (Bilateral); Tonsillectomy; other surgical history (06/08/15); and Cardiac catheterization (2017). Restrictions  Restrictions/Precautions  Restrictions/Precautions: Fall Risk  Subjective   General  Chart Reviewed: Yes  Patient assessed for rehabilitation services?: Yes  Response to previous treatment: Patient with no complaints from previous session  Family / Caregiver Present: Yes(Wife)  Referring Practitioner: Dr. Mary Beth Rivera  Diagnosis: Impaired mobility and ADLs 2° to severe parkinson's disease with blunt chest trauma  Subjective  Subjective: I'll show you the wound.   Pain Assessment  Pain Assessment: 0-10  Pain Level: 4  Pain Type: Acute pain  Pain Location: Back  Pain Orientation: Right; Lower  Pain Descriptors: Sore  Pre Treatment Pain Screening  Pain at present: 4  Scale Used: Numeric Score  Intervention List: Patient able to continue with treatment;Patient declined any intervention  Vital Signs  Patient Currently in Pain: Yes   Orientation    WFL    Objective      ADL  Toileting: Supervision  Additional Comments: Pt. stood to urinate with no LOB or difficulty. Able to bend forward with hand on grab bar and lift up the toilet seat. Pt. had no difficulty with this task. Toilet Transfers  Toilet Transfer: Unable to assess  Toilet Transfers Comments: Stood to toilet    Instrumental ADL's  Instrumental ADLs: Yes  Light Housekeeping  Light Housekeeping: Pt. engaged in laundry task to improve endurance. Pt. was able to match basket of socks with F+ overall endurance; pt. required increased time for reaching and occasional rest breaks due to fatigue. Pt. also required min increased time to scan table and locate socks to pair. Balance  Standing Balance: Supervision  Standing Balance  Time: 4 x 3 minutes  Activity: Pt. stood with [employer] PRN for balance to complete dynamic standing task with ball toss. Pt. had no difficulty weight shifting and was able to hold the basket of balls in one hand and toss the balls with the other. Pt. had no LOB but does have difficulty with decreased overall ability to aim balls due to decreased vision.         Transfers  Sit to stand: Supervision  Stand to sit: Supervision  Transfer Comments: No LOB or difficulty       Plan   Plan  Times per week: 5-7x  Plan weeks: 1.5 weeks  Current Treatment Recommendations: Strengthening, Functional Mobility Training, Patient/Caregiver Education & Training, Home Management Training, Cognitive/Perceptual Training, Pain Management, Balance Training, Neuromuscular Re-education, Self-Care / ADL, Safety Education & Training, Endurance Training  Plan Comment: Continue per OT POC  G-Code  OutComes Score  AM-PAC Score  Goals  Patient Goals   Patient goals : \"I want to get back to independence\"       Therapy Time   Individual Concurrent Group Co-treatment   Time In 1410         Time Out 1510         Minutes 60           ADL trainin minutes  Therapeutic activities: 25 minutes       Electronically signed by LYSSA Vizcarra/MARCIA on 2020 at 3:59 PM    LYSSA Vizcarra/MARCIA

## 2020-07-08 NOTE — PROGRESS NOTES
Facility/Department: [facility]  Daily Treatment Note  NAME: Anuel Nazario  : 1940  MRN: [id number]    Date of Service: 2020    Discharge Recommendations:  Continue to assess pending progress       Assessment   Assessment: Pt. demonstrates G strength but continues to be limited by fatigue. Continues to benefit from OT to maximize independence with ADL tasks. Activity Tolerance  Activity Tolerance: Patient Tolerated treatment well  Safety Devices  Safety Devices in place: Yes  Type of devices: All fall risk precautions in place         Patient Diagnosis(es): There were no encounter diagnoses. has a past medical history of Actinic keratoses, Aortic valve stenosis, severe, BPH (benign prostatic hyperplasia), Cancer ([facility] [address] Colon polyp, Diastolic dysfunction, ED (erectile dysfunction), Glaucoma, Hemochromatosis, History of echocardiogram, History of echocardiogram, Hypertension, Iron deficiency anemia, LVH (left ventricular hypertrophy), Macular degeneration, Mass on back, Osteoarthritis cervical spine, and Parkinson disease ([facility] [address] has a past surgical history that includes Colonoscopy (2009); back surgery; Cataract removal; eye surgery (Bilateral); Tonsillectomy; other surgical history (06/08/15); and Cardiac catheterization (2017). Restrictions  Restrictions/Precautions  Restrictions/Precautions: Fall Risk  Subjective   General  Chart Reviewed: Yes  Patient assessed for rehabilitation services?: Yes  Response to previous treatment: Patient with no complaints from previous session  Family / Caregiver Present: Yes(Wife)  Referring Practitioner: Dr. Frederica Nyhan  Diagnosis: Impaired mobility and ADLs 2° to severe parkinson's disease with blunt chest trauma  Subjective  Subjective: I'll show you the wound.   Pain Assessment  Pain Assessment: 0-10  Pain Level: 4  Pain Type: Acute pain  Pain Location: Back  Pain Orientation: Lower;Right  Pain Descriptors: Sore  Pre Treatment Pain Screening  Pain at present: 4  Scale Used: Numeric Score  Intervention List: Patient able to continue with treatment;Patient declined any intervention  Comments / Details: Pt. states he is comfortable  Vital Signs  Patient Currently in Pain: Yes     Orientation     Objective      ADL  Toileting: Supervision  Additional Comments: Pt. attempted to urinate but was unsuccessful. Pt. was able to manage pants and hygiene as if toileting as above. Instrumental ADL's  Instrumental ADLs: Yes  Light Housekeeping  Light Housekeeping: Pt. engaged in laundry task to improve endurance. Pt. was able to match basket of socks with F+ overall endurance; pt. required increased time for reaching and occasional rest breaks due to fatigue. Pt. also required min increased time to scan table and locate socks to pair. Balance  Standing Balance: Supervision  Standing Balance  Time: 4 x 3 minutes  Activity: Pt. stood with [facility] PRN for balance to complete dynamic standing task with ball toss. Pt. had no difficulty weight shifting and was able to hold the basket of balls in one hand and toss the balls with the other. Pt. had no LOB but does have difficulty with decreased overall ability to aim balls due to decreased vision. Functional Mobility  Functional - Mobility Device: Cane  Activity: To/from bathroom  Assist Level: Modified independent   Functional Mobility Comments: No LOB and G safety ambulating around obstacles in his room to the bathroom. Toilet Transfers  Toilet Transfer: Unable to assess  Toilet Transfers Comments: Stood to toilet     Transfers  Sit to stand: Supervision  Stand to sit: Supervision  Transfer Comments: No LOB or difficulty        Coordination  Fine Motor: Pt. utilized purple theraputty to improve hand strength. Pt. was able to locate 12 beads using B hands to push, pinch and flatten putty. Pt. has no difficulty manipulating putty but mod difficulty squeezing through due to hand strength.       Type of ROM/Therapeutic Exercise  Type of ROM/Therapeutic Exercise: Free weights  Comment: 1lb free weight utilized for UE strengthening as below.    Exercises  Shoulder Flexion: 2 x 10 reps  Shoulder Extension: 2 x 10 reps  Shoulder ABduction: 1 x 10 reps  Shoulder ADduction: 1 x 10 reps  Horizontal ABduction: 1 x 10 reps  Horizontal ADduction: 1 x 10 reps  Elbow Flexion: 2 x 10 reps  Elbow Extension: 2 x 10 reps  Supination: 2 x 10 reps  Pronation: 2 x 10 reps  Wrist Flexion: 2 x 10 reps  Wrist Extension: 2 x 10 reps     Plan   Plan  Times per week: 5-7x  Plan weeks: 1.5 weeks  Current Treatment Recommendations: Strengthening, Functional Mobility Training, Patient/Caregiver Education & Training, Home Management Training, Cognitive/Perceptual Training, Pain Management, Balance Training, Neuromuscular Re-education, Self-Care / ADL, Safety Education & Training, Endurance Training  Plan Comment: Continue per OT POC  G-Code     OutComes Score  AM-PAC Score  Goals  Patient Goals   Patient goals : \"I want to get back to independence\"       Therapy Time   Individual Concurrent Group Co-treatment   Time In 1535         Time Out 1610         Minutes 35           ADL trainin minutes  Therapeutic activities: 27 minutes         LYSSA Love/L Electronically signed by JAYCE Love on 2020 at 4:42 PM

## 2020-07-08 NOTE — PROGRESS NOTES
[facility] Neurology Daily Progress Note  Name: Juan Skinner  Age: [de-identified] y.o. Gender: male  CodeStatus: Full Code  Allergies: Cat Hair Extract    Chief Complaint:No chief complaint on file. Primary Care Provider: Mendel Bears, MD  InpatientTreatment Team: Treatment Team: Attending Provider: Dami Tomas DO; Consulting Physician: Jessica Boyd MD; Consulting Physician: Mango Peter MD; Registered Nurse: Lesvia Edwards RN; Occupational Therapist Assistant: TAYLOR Montejo; Registered Nurse: Deysi Arora RN; Patient Care Tech: Lexus Varghese; Consulting Physician: Maya Thrasher MD  Admission Date: 7/1/2020      HPI   Pt seen and examined on rehab unit for neuro follow up Parkinson's disease and syncope secondary to significant orthostatic hypotension due to autonomic dysfunction. .  Patient on midodrine. Continues to have symptomatic orthostatic hypotension. Parkinson symptoms well managed on current dose of Sinemet. Vitals:    07/08/20 1241   BP: 126/67   Pulse: 71   Resp: 16   Temp:    SpO2:       Review of Systems   Constitutional: Negative for appetite change, chills, fatigue and fever. HENT: Negative for hearing loss and trouble swallowing. Eyes: Negative for visual disturbance. Respiratory: Negative for cough, chest tightness, shortness of breath and wheezing. Cardiovascular: Negative for chest pain, palpitations and leg swelling. Gastrointestinal: Negative for nausea and vomiting. Musculoskeletal: Positive for gait problem. Skin: Negative for color change and rash. Neurological: Positive for dizziness and light-headedness. Negative for tremors, seizures, syncope, facial asymmetry, speech difficulty, weakness, numbness and headaches. Psychiatric/Behavioral: Negative for agitation, confusion and hallucinations. The patient is not nervous/anxious. Physical Exam  Vitals signs and nursing note reviewed.    Constitutional:       General: He is not in acute distress. Appearance: He is not diaphoretic. HENT:      Head: Normocephalic and atraumatic. Eyes:      Extraocular Movements: Extraocular movements intact. Pupils: Pupils are equal, round, and reactive to light. Cardiovascular:      Rate and Rhythm: Normal rate and regular rhythm. Pulmonary:      Effort: Pulmonary effort is normal. No respiratory distress. Breath sounds: Normal breath sounds. Abdominal:      General: Bowel sounds are normal. There is no distension. Palpations: Abdomen is soft. Tenderness: There is no abdominal tenderness. Skin:     General: Skin is warm and dry. Neurological:      General: No focal deficit present. Mental Status: He is alert and oriented to person, place, and time. Cranial Nerves: No cranial nerve deficit. Motor: No tremor or seizure activity. Gait: Gait abnormal.               Medications:  Reviewed    Infusion Medications:   Scheduled Medications:    ciprofloxacin  500 mg Oral 2 times per day    lactobacillus acidophilus  2 tablet Oral TID    lidocaine  3 patch Transdermal Daily    analgesic ointment   Topical TID    Vitamin D  2,000 Units Oral Dinner    cyanocobalamin  1,000 mcg Intramuscular Weekly    coenzyme Q10  100 mg Oral BID AC    ferrous sulfate  325 mg Oral BID WC    brimonidine  1 drop Both Eyes BID    carbidopa-levodopa  1 tablet Oral TID    folic acid  7,265 mcg Oral Daily    latanoprost  1 drop Both Eyes Nightly    midodrine  5 mg Oral TID WC    vitamin B-12  1,000 mcg Oral Daily    nystatin  5 mL Oral TID     PRN Meds: HYDROcodone 5 mg - acetaminophen, bisacodyl, fleet, acetaminophen **OR** acetaminophen, polyethylene glycol, promethazine **OR** ondansetron, sodium chloride flush    Labs:   Recent Labs     07/07/20  0618   WBC 4.4*   HGB 8.2*   HCT 24.7*        No results for input(s): NA, K, CL, CO2, BUN, CREATININE, CALCIUM, PHOS in the last 72 hours.     Invalid input(s): GLYNN  No regions. Empty sella. No Chiari malformation. Mucosal disease left maxillary sinus. No acute fracture or lytic or blastic bony lesion. Postoperative changes left orbit/optic globe. Airways and paranasal sinuses grossly unremarkable. Impression 1. No CT evidence of acute intracranial abnormality, as questioned. 2. Moderate to moderately severe cerebral volume loss/atrophy with white matter changes consistent with sequelae small vessel ischemic disease. .  3. Vascular calcifications within the bilateral internal carotid artery cavernous segments and the vertebral arteries. .  4. Empty sella. No results found for this or any previous visit. No results found for this or any previous visit. Carotid duplex: No results found for this or any previous visit. No results found for this or any previous visit. Results for orders placed during the hospital encounter of 20   US CAROTID ARTERY BILATERAL    Narrative    Patient MRN:  [id number]    : 1940    Age: [de-identified] years    Gender: Male    Order Date:  2020 2:00 PM    EXAM: US CAROTID ARTERY BILATERAL    NUMBER OF IMAGES:  55    INDICATION:  syncope, vertigo      COMPARISON: Carotid artery ultrasound 2017    FINDINGS:   Right side:  Proximal common carotid artery peak systolic velocity is 938 centimeters per second  Mid, common carotid artery peak systolic velocity is 905 centimeters per second. Distal common carotid artery peak systolic velocity is 30.0 centimeters per second    External carotid artery peak systolic velocity is 80.5 centimeters per second. Proximal internal carotid artery peak systolic velocity is 89.8 centimeters per second  Mid internal carotid artery peak systolic velocity is 05.3 centimeters per second. Distal internal carotid artery peak systolic velocity is 39.0 centimeters per second    Vertebral artery peak systolic velocity is 21.0 centimeters per second.   Vertebral artery flow is antegrade    ICA/CCA ratio is 0.85    Left side:  Proximal common carotid artery peak systolic velocity is 313 centimeters per second  Mid, common carotid artery peak systolic velocity is 196 centimeters per second. Distal common carotid artery peak systolic velocity is 91.9 centimeters per second. External carotid artery peak systolic velocity is 25.1 centimeters per second. Proximal internal carotid artery peak systolic velocity is 05.5 centimeters per second  Mid internal carotid artery peak systolic velocity is 54.8 centimeters per second. Distal internal carotid artery peak systolic velocity is 15.7 centimeters per second    Vertebral artery peak systolic velocity is 86.4 centimeters per second. Vertebral artery flow is antegrade    ICA/CCA ratio is 0.78. Scattered areas of calcified and noncalcified plaque bilateral internal carotid arteries and bilateral carotid bulb/bifurcations. Impression Peak systolic velocities, ICA/CCA ratios and antegrade vertebral artery flow as above. See above for details of the examination and below for internal carotid artery interpretation guidelines. 1. No areas of estimated luminal stenosis greater than 0-49 percent within the bilateral internal carotid arteries. .  2. Scattered areas of calcified and noncalcified plaque bilateral internal carotid arteries and bilateral carotid artery bulb/bifurcations.     GOSINK CRITERIA    Diameter          PSV t            EDV t          PSV          EDV          Stenosis Site        %              cm/sec          cm/sec      ICA/CCA    ICA/CCA    0-49                 <124              <40             <2:1           ---                 ---    50-69              125-225                  >2:1           ---                 ---    70-89               225-325          >100          >4:1           >5:1              ---    90%+                >325              >100          >4:1           >9:1           Damped resistive CCA    >95%               May be May be      Damped      ---              Damped resistive CCA                        decreased      decreased  resistive CCA      Validated velocity measurements with angiographic measurements, velocity criteria are extrapolated from diameter data as defined by the Society of Radiologist in [address] [place] [address] Drive Radiology 2003; 631;384-418. Echo No results found for this or any previous visit. Assessment/Plan:    Syncope in the setting of hypotension and acute blood loss anemia secondary to abdominal wall hematoma  Patient with history of Parkinson's disease and primary autonomic dysfunction with orthostatic hypotension  Carotid duplex less than 50% stenosis bilaterally  Continue Sinemet, good symptom management at this time  Continue Midodrine  Consider Northera as outpatient will trial samples of 100 mg 3 times daily  BRIAN hose  Assess orthostatic blood pressures daily  Continue PT/OT    I independently performed an evaluation on this patient. I have reviewed the above documentation completed by the Nurse Practitioner. Please see my additional contributions to the HPI, physical exam, assessment/medical decision making. Jared Yuan MD, [address], American Board of Psychiatry & Neurology  Board Certified in Vascular Neurology  Board Certified in Neuromuscular Medicine  Certified in Neurorehabilitation         Collaborating physicians: Dr Vic Yuan    Electronically signed by NANCY Baires CNP on 7/8/2020 at 3:58 PM

## 2020-07-09 LAB
ANISOCYTOSIS: ABNORMAL
BASOPHILS ABSOLUTE: 0.1 K/UL (ref 0–0.2)
BASOPHILS RELATIVE PERCENT: 2 %
EOSINOPHILS ABSOLUTE: 0.1 K/UL (ref 0–0.7)
EOSINOPHILS RELATIVE PERCENT: 2 %
HCT VFR BLD CALC: 25.8 % (ref 42–52)
HEMOGLOBIN: 8.7 G/DL (ref 14–18)
LYMPHOCYTES ABSOLUTE: 0.9 K/UL (ref 1–4.8)
LYMPHOCYTES RELATIVE PERCENT: 23 %
MACROCYTES: ABNORMAL
MCH RBC QN AUTO: 35.4 PG (ref 27–31.3)
MCHC RBC AUTO-ENTMCNC: 33.8 % (ref 33–37)
MCV RBC AUTO: 104.8 FL (ref 80–100)
METAMYELOCYTES RELATIVE PERCENT: 1 %
MONOCYTES ABSOLUTE: 0.4 K/UL (ref 0.2–0.8)
MONOCYTES RELATIVE PERCENT: 9.4 %
NEUTROPHILS ABSOLUTE: 2.6 K/UL (ref 1.4–6.5)
NEUTROPHILS RELATIVE PERCENT: 63 %
PDW BLD-RTO: 19.3 % (ref 11.5–14.5)
PLATELET # BLD: 242 K/UL (ref 130–400)
PLATELET SLIDE REVIEW: NORMAL
RBC # BLD: 2.46 M/UL (ref 4.7–6.1)
SMUDGE CELLS: 0.9
WBC # BLD: 4 K/UL (ref 4.8–10.8)

## 2020-07-09 PROCEDURE — 97112 NEUROMUSCULAR REEDUCATION: CPT

## 2020-07-09 PROCEDURE — 6370000000 HC RX 637 (ALT 250 FOR IP): Performed by: PHYSICAL MEDICINE & REHABILITATION

## 2020-07-09 PROCEDURE — 36415 COLL VENOUS BLD VENIPUNCTURE: CPT

## 2020-07-09 PROCEDURE — 97116 GAIT TRAINING THERAPY: CPT

## 2020-07-09 PROCEDURE — 85025 COMPLETE CBC W/AUTO DIFF WBC: CPT

## 2020-07-09 PROCEDURE — 6360000002 HC RX W HCPCS: Performed by: PHYSICAL MEDICINE & REHABILITATION

## 2020-07-09 PROCEDURE — 97110 THERAPEUTIC EXERCISES: CPT

## 2020-07-09 PROCEDURE — 1180000000 HC REHAB R&B

## 2020-07-09 PROCEDURE — 99232 SBSQ HOSP IP/OBS MODERATE 35: CPT | Performed by: PHYSICAL MEDICINE & REHABILITATION

## 2020-07-09 PROCEDURE — 97530 THERAPEUTIC ACTIVITIES: CPT

## 2020-07-09 PROCEDURE — 97535 SELF CARE MNGMENT TRAINING: CPT

## 2020-07-09 PROCEDURE — 6370000000 HC RX 637 (ALT 250 FOR IP): Performed by: INTERNAL MEDICINE

## 2020-07-09 PROCEDURE — 51798 US URINE CAPACITY MEASURE: CPT

## 2020-07-09 RX ORDER — DROXIDOPA 100 MG/1
100 CAPSULE ORAL 3 TIMES DAILY
Status: DISCONTINUED | OUTPATIENT
Start: 2020-07-10 | End: 2020-07-12 | Stop reason: HOSPADM

## 2020-07-09 RX ADMIN — LACTOBACILLUS TAB 2 TABLET: TAB at 20:31

## 2020-07-09 RX ADMIN — MENTHOL AND METHYL SALICYLATE: 7.6; 29 OINTMENT TOPICAL at 13:08

## 2020-07-09 RX ADMIN — MENTHOL AND METHYL SALICYLATE: 7.6; 29 OINTMENT TOPICAL at 20:33

## 2020-07-09 RX ADMIN — CIPROFLOXACIN 500 MG: 500 TABLET, FILM COATED ORAL at 20:31

## 2020-07-09 RX ADMIN — NYSTATIN 500000 UNITS: 100000 SUSPENSION ORAL at 09:14

## 2020-07-09 RX ADMIN — LACTOBACILLUS TAB 2 TABLET: TAB at 13:06

## 2020-07-09 RX ADMIN — MIDODRINE HYDROCHLORIDE 5 MG: 5 TABLET ORAL at 09:13

## 2020-07-09 RX ADMIN — CYANOCOBALAMIN TAB 500 MCG 1000 MCG: 500 TAB at 09:12

## 2020-07-09 RX ADMIN — FERROUS SULFATE TAB 325 MG (65 MG ELEMENTAL FE) 325 MG: 325 (65 FE) TAB at 09:14

## 2020-07-09 RX ADMIN — BRIMONIDINE TARTRATE 1 DROP: 2 SOLUTION OPHTHALMIC at 09:19

## 2020-07-09 RX ADMIN — MIDODRINE HYDROCHLORIDE 5 MG: 5 TABLET ORAL at 13:07

## 2020-07-09 RX ADMIN — MENTHOL AND METHYL SALICYLATE: 7.6; 29 OINTMENT TOPICAL at 09:19

## 2020-07-09 RX ADMIN — BRIMONIDINE TARTRATE 1 DROP: 2 SOLUTION OPHTHALMIC at 20:34

## 2020-07-09 RX ADMIN — Medication 100 MG: at 06:14

## 2020-07-09 RX ADMIN — Medication 100 MG: at 16:58

## 2020-07-09 RX ADMIN — FERROUS SULFATE TAB 325 MG (65 MG ELEMENTAL FE) 325 MG: 325 (65 FE) TAB at 16:59

## 2020-07-09 RX ADMIN — NYSTATIN 500000 UNITS: 100000 SUSPENSION ORAL at 20:31

## 2020-07-09 RX ADMIN — NYSTATIN 500000 UNITS: 100000 SUSPENSION ORAL at 13:08

## 2020-07-09 RX ADMIN — CARBIDOPA AND LEVODOPA 1 TABLET: 25; 100 TABLET ORAL at 20:31

## 2020-07-09 RX ADMIN — CARBIDOPA AND LEVODOPA 1 TABLET: 25; 100 TABLET ORAL at 13:06

## 2020-07-09 RX ADMIN — LACTOBACILLUS TAB 2 TABLET: TAB at 09:11

## 2020-07-09 RX ADMIN — CYANOCOBALAMIN 1000 MCG: 1000 INJECTION, SOLUTION INTRAMUSCULAR at 09:14

## 2020-07-09 RX ADMIN — VITAMIN D, TAB 1000IU (100/BT) 2000 UNITS: 25 TAB at 16:58

## 2020-07-09 RX ADMIN — CIPROFLOXACIN 500 MG: 500 TABLET, FILM COATED ORAL at 09:13

## 2020-07-09 RX ADMIN — FOLIC ACID 1000 MCG: 1 TABLET ORAL at 09:12

## 2020-07-09 RX ADMIN — CARBIDOPA AND LEVODOPA 1 TABLET: 25; 100 TABLET ORAL at 09:14

## 2020-07-09 RX ADMIN — LATANOPROST 1 DROP: 50 SOLUTION OPHTHALMIC at 20:33

## 2020-07-09 RX ADMIN — MIDODRINE HYDROCHLORIDE 5 MG: 5 TABLET ORAL at 16:58

## 2020-07-09 ASSESSMENT — PAIN DESCRIPTION - LOCATION
LOCATION: BACK
LOCATION: BACK

## 2020-07-09 ASSESSMENT — PAIN DESCRIPTION - ORIENTATION
ORIENTATION: RIGHT;LOWER
ORIENTATION: LOWER;RIGHT

## 2020-07-09 ASSESSMENT — PAIN DESCRIPTION - DESCRIPTORS
DESCRIPTORS: SORE
DESCRIPTORS: SORE

## 2020-07-09 ASSESSMENT — PAIN DESCRIPTION - PAIN TYPE: TYPE: ACUTE PAIN

## 2020-07-09 ASSESSMENT — PAIN SCALES - GENERAL: PAINLEVEL_OUTOF10: 5

## 2020-07-09 NOTE — PLAN OF CARE
Problem: Falls - Risk of:  Goal: Will remain free from falls  Description: Will remain free from falls  7/9/2020 1136 by Lezlie Brittle, RN  Outcome: Ongoing  7/9/2020 0020 by Jose Luis Magdaleno RN  Outcome: Ongoing  Goal: Absence of physical injury  Description: Absence of physical injury  7/9/2020 1136 by Lezlie Brittle, RN  Outcome: Ongoing  7/9/2020 0020 by Jose Luis Magdaleno RN  Outcome: Ongoing     Problem: Skin Integrity:  Goal: Will show no infection signs and symptoms  Description: Will show no infection signs and symptoms  7/9/2020 1136 by Lezlie Brittle, RN  Outcome: Ongoing  7/9/2020 0020 by Jose Luis Magdaleno RN  Outcome: Ongoing  Goal: Absence of new skin breakdown  Description: Absence of new skin breakdown  7/9/2020 1136 by Lezlie Brittle, RN  Outcome: Ongoing  7/9/2020 0020 by Jose Luis Magdaleno RN  Outcome: Ongoing     Problem: Mobility - Impaired:  Goal: Mobility will improve  Description: Mobility will improve  7/9/2020 1136 by Lezlie Brittle, RN  Outcome: Ongoing  7/9/2020 0020 by Jose Luis Magdaleno RN  Outcome: Ongoing     Problem: IP COMMUNICATION/DYSARTHRIA  Goal: LTG - patient will improve expressive language skills to allow for communication of wants and needs in daily activities  7/9/2020 1136 by Lezlie Brittle, RN  Outcome: Ongoing  7/9/2020 0020 by Jose Luis Magdaleno RN  Outcome: Ongoing  Goal: LTG - patient will utilize compensatory intervention for intelligibility  7/9/2020 1136 by Lezlie Brittle, RN  Outcome: Ongoing  7/9/2020 0020 by Jose Luis Magdaleno RN  Outcome: Ongoing     Problem: IP SWALLOWING  Goal: LTG - patient will tolerate the least restrictive diet consistency to allow for safe consumption of daily meals  7/9/2020 1136 by Lezlie Brittle, RN  Outcome: Ongoing  7/9/2020 0020 by Jose Luis Magdaleno RN  Outcome: Ongoing     Problem: IP BALANCE  Goal: LTG - Patient will maintain balance to allow for safe/functional mobility  7/9/2020 1136 by Lezlie Brittle, RN  Outcome: Ongoing  7/9/2020 0020 by Jose Luis Magdaleno RN  Outcome: Ongoing     Problem: Pain:  Goal: Pain level will decrease  Description: Pain level will decrease  7/9/2020 1136 by Mar Taveras RN  Outcome: Ongoing  7/9/2020 0020 by Darrion Figueroa RN  Outcome: Ongoing  Goal: Control of acute pain  Description: Control of acute pain  7/9/2020 1136 by Mar Taveras RN  Outcome: Ongoing  7/9/2020 0020 by Darrion Figueroa RN  Outcome: Ongoing  Goal: Control of chronic pain  Description: Control of chronic pain  7/9/2020 1136 by Mar Taveras RN  Outcome: Ongoing  7/9/2020 0020 by Darrion Figueroa RN  Outcome: Ongoing

## 2020-07-09 NOTE — CARE COORDINATION
Spoke with patient to update on discharge plan and follow up therapy. Patient is requesting to go back to [address] [place] [address] for op, orders placed.  Electronically signed by Renetta Kohli RN on 7/9/2020 at 4:02 PM

## 2020-07-09 NOTE — PROGRESS NOTES
discharge. Patient's speech was [facility] at conversational level. Goal met  Goal 5: Goal Met  Long-term Goals  Timeframe for Long-term Goals: 2-3 weeks  Goal 1: Pt will increase cognitive-linguistic skills to standby assist level in order to promote safety with the completion of ADLs upon discharge. Goal 2: Pt will increase speech intelligibility to min assist level in order to effectively communicate wants and needs with caregivers upon discharge. Short-term Goals  Timeframe for Short-term Goals: 2-3 weeks  Goal 1: Pt will tolerate 5/5 trials of regular consistencies and thin liquids with no overt s/s of aspiration observed. Goal met. Pt consuming regular solids with thin liquids  Compensatory Swallowing Strategies: Small bites/sips, Eat/Feed slowly, Upright as possible for all oral intake      Treatment/Activity Tolerance:  Patient tolerated treatment well    Plan:  Discharge all goals met    Pain:  Patient demonstrated no s/s of pain. Patient/Caregiver Education:  Patient educated on session and progression towards goals.     Safety Devices:  Call light within reach and Chair alarm in place      [id number] Rex Chapa ([facility]):    SWALLOWING  Rating:  DNT    SPOKEN LANGUAGE COMPREHENSION  Rating:  DNT    SPOKEN LANGUAGE EXPRESSION  Rating:  DNT    MOTOR SPEECH  Rating:  DNT    PROBLEM SOLVING  Ratin    MEMORY  Ratin          Therapy Time  SLP Individual Minutes  Time In: 1030  Time Out: 1100  Minutes: 30              Signature: Electronically signed by JAMES Joiner on 2020 at 1:40 PM

## 2020-07-09 NOTE — PROGRESS NOTES
Pt is AO x 3 and pleasant with poor safety awareness at times, NATASHA in room for pt safety, no c/o pain at this time but it is managed with PRN Tylenol PRN Norco and IcyHot, LBM 7/9 he did require a dulcolax tablet last night as his previous BM was 7/5 he also continues on Cipro to manage his UTI, call light within reach for pt safety chair alarm engaged for pt safety Electronically signed by Isa Lauren RN on 7/9/2020 at 11:40 AM     Secure message sent to Judith New NP regarding concerns of hypotensive episode yesterday, order obtained to change from Proamatine TID to Droxidopa 100mg TID this medication was sent to pharmacy by this nurse, verified and returned to pt drawer educated pt on medication change Electronically signed by Isa Lauren RN on 7/9/2020 at 6:53 PM

## 2020-07-09 NOTE — PROGRESS NOTES
reported standing from chair fell better today with less c/o dizziness. Pt had difficulty with seated shoulder flexion exercises and displayed reduced ROM on right as compared to left. PLAN OF CARE/Safety:   Safety Devices  Type of devices: All fall risk precautions in place      Therapy Time:   Individual   Time In 1130   Time Out 1200   Minutes 30     Minutes:30            Gait training: 15      Neuro re education: 5     Therapeutic ex: Conrad Pagan 07/09/20 at 12:09 PM    This therapy session was supervised by Kymberly Rodriguez PTA.

## 2020-07-09 NOTE — PROGRESS NOTES
Patient A&O x4 throughout the evening. Calling for assist as needed. Chair/ bed alarm in place for safety. NATASHA in place due to patient being impulsive. Patient walked to bathroom with a stand by assist and cane. RN remained with patient while in the restroom. Patient reports feeling \"dizzy a little\" while walking but remained balanced. Abdominal binder in place but BRIAN hose off. Education given. PVR completed x2; documentation completed. Dulcoax given, Last BM 7/5. Norco for pain and patient reports good pain control.      Electronically signed by Ritesh Brownlee RN on 7/9/2020 at 3:19 AM

## 2020-07-09 NOTE — PROGRESS NOTES
Subjective: The patient complains of severe  acute on chronic progressive weakness secondary to anemia partially relieved by blood transfusions, rest, PT, OT and exacerbated by current decline in H&H. I discussed nursing care with current and recent nursing note was reviewed, \"patient A&O x4 throughout the evening. Calling for assist as needed. Chair/ bed alarm in place for safety. NATASHA in place due to patient being impulsive. Patient walked to bathroom with a stand by assist and cane. RN remained with patient while in the restroom. Patient reports feeling \"dizzy a little\" while walking but remained balanced. Abdominal binder in place but BRIAN hose off. Education given. PVR completed x2; documentation completed. Dulcoax given, Last BM 7/5. Norco for pain and patient reports good pain control. \"    I also coordinated care with urology and we all agreed to treat his UTI for now consider Ditropan in the future if his PVRs are okay but start Proscar for now. I am concerned of his poor sleep impulsivity and urinary urgency likely fueled by UTI. He is also taken off his Flomax recently possibly due to blood pressure issues likely leading to retention and UTI and now a decline in his function. Indeed review of the chart showed he had a low BP we have prescribed abdominal binder and teds and the Flomax had been held. Enterococcus faecalis (1)     Antibiotic Interpretation CEE Status   ampicillin Sensitive <=2 mcg/mL    ciprofloxacin Sensitive 1 mcg/mL    nitrofurantoin Sensitive <=16 mcg/mL    vancomycin Sensitive 2 mcg/mL          ROS x10: The patient also complains of severely impaired mobility and activities of daily living. Otherwise no new problems with vision, hearing, nose, mouth, throat, dermal, cardiovascular, GI, , pulmonary, musculoskeletal, psychiatric or neurological. See Rehab H&P on Rehab chart dated .        Vital signs:  /79   Pulse 76   Temp 98 °F (36.7 °C)   Resp 15   Ht 6' (1.829 m)   Wt 167 lb 9.6 oz (76 kg)   SpO2 98%   BMI 22.73 kg/m²   I/O:   PO/Intake:  fair PO intake, no problems observed or reported. Bowel/Bladder:  continent, urinary urgency and acute UTI-history of retention  General:  Patient is well developed, adequately nourished, non-obese and     well kempt. HEENT:    PERRLA, hearing intact to loud voice, external inspection of ear     and nose benign. Inspection of lips, tongue and gums benign  Musculoskeletal: No significant change in strength or tone. All joints stable. Inspection and palpation of digits and nails show no clubbing,       cyanosis or inflammatory conditions. Neuro/Psychiatric: Affect: flat but pleasant. Alert and oriented to person, place and     situation. No significant change in deep tendon reflexes or     Sensation-poor insight into his deficits  Lungs:  Diminished, CTA-B. Respiration effort is normal at rest.     Heart:   S1 = S2, RRR. No loud murmurs. Abdomen:  Soft, non-tender, no enlargement of liver or spleen. Extremities:  No significant lower extremity edema or tenderness. Skin:   Intact to general survey, no visualized or palpated problems-chest bruising. Rehabilitation:  Physical therapy: FIMS:  Bed Mobility: Scooting: Stand by assistance    Transfers: Sit to Stand: Stand by assistance  Stand to sit: Stand by assistance  Bed to Chair: Contact guard assistance, Ambulation 1  Surface: carpet  Device: Single point cane  Assistance: Stand by assistance  Quality of Gait: Good ALEN, exaggerated knee and hip flexion bilaterally,   Gait Deviations: Slow Renetta, Increased ALEN, Decreased step length  Distance: 30' x 2, 60'   Comments: VC for proper standing technique from chair. , Stairs  # Steps : 12  Stairs Height: 6\"  Rails: Bilateral  Assistance: Stand by assistance  Comment: Pt able to ascend and descend stairs safely and recip without VC.      FIMS:  ,  , Assessment: Pt needed increased time to complete gait and standing activities due to pt feeling slightly dizzy upon standing. Pt SBA for gait training and required VC to correct standing technique from chair. Pt had difficulty with standing tandem balance when right foot is forward. Occupational therapy: FIMS:   ,  , Assessment: Pt. demonstrates G strength but continues to be limited by fatigue. Continues to benefit from OT to maximize independence with ADL tasks. Speech therapy: FIMS:        Lab/X-ray studies reviewed, analyzed and discussed with patient and staff:   Recent Results (from the past 24 hour(s))   CBC Auto Differential    Collection Time: 07/09/20  5:32 AM   Result Value Ref Range    WBC 4.0 (L) 4.8 - 10.8 K/uL    RBC 2.46 (L) 4.70 - 6.10 M/uL    Hemoglobin 8.7 (L) 14.0 - 18.0 g/dL    Hematocrit 25.8 (L) 42.0 - 52.0 %    .8 (H) 80.0 - 100.0 fL    MCH 35.4 (H) 27.0 - 31.3 pg    MCHC 33.8 33.0 - 37.0 %    RDW 19.3 (H) 11.5 - 14.5 %    Platelets 467 408 - 833 K/uL    PLATELET SLIDE REVIEW Normal     Neutrophils % 63.0 %    Lymphocytes % 23.0 %    Monocytes % 9.4 %    Eosinophils % 2.0 %    Basophils % 2.0 %    Neutrophils Absolute 2.6 1.4 - 6.5 K/uL    Lymphocytes Absolute 0.9 (L) 1.0 - 4.8 K/uL    Monocytes Absolute 0.4 0.2 - 0.8 K/uL    Eosinophils Absolute 0.1 0.0 - 0.7 K/uL    Basophils Absolute 0.1 0.0 - 0.2 K/uL    Metamyelocytes Relative 1 %    Smudge Cells 0.9     Anisocytosis 1+     Macrocytes 1+        Xr Lumbar Spine   6/29/2020    Nonspecific bowel gas pattern. Vascular calcifications noted abdominal aorta. Normal sagittal alignment lumbar spine. Possible fracture versus osteophyte anterior superior corner of L2. Moderate facet osteoarthropathy L3-S1. Moderate degenerative disc disease L5-S1. No Pars interarticularis fracture defect. Impression:  1. Possible chip fracture versus osteophyte anterior superior corner of L2, further evaluation with MRI of the lumbar spine recommended.  2. Moderate facet osteoarthropathy L3-S1 with moderate degenerative disc disease L5-S1. 3. Vascular calcifications abdominal aorta. Ct Head   6/29/2020    Vascular calcifications within the bilateral internal carotid artery cavernous segments and the vertebral arteries. . Moderate to moderately severe cerebral volume loss/atrophy. No subfalcine, transtentorial or tonsillar herniation or shift. No intraparenchymal hemorrhage. No extra-axial fluid collection or hematoma. Hypodensities bilateral centrum semiovale and periventricular regions. Empty sella. No Chiari malformation. Mucosal disease left maxillary sinus. No acute fracture or lytic or blastic bony lesion. Postoperative changes left orbit/optic globe. Airways and paranasal sinuses grossly unremarkable. 1. No CT evidence of acute intracranial abnormality, as questioned. 2. Moderate to moderately severe cerebral volume loss/atrophy with white matter changes consistent with sequelae small vessel ischemic disease. . 3. Vascular calcifications within the bilateral internal carotid artery cavernous segments and the vertebral arteries. . 4. Empty sella. Ct Chest   6/29/2020   1. NO ACUTE TRAUMATIC INJURY TO THE CHEST AS DESCRIBED IN BODY OF REPORT. 2. MEDIASTINAL STRUCTURES AND THORACIC AORTA INTACT. NO MEDIASTINAL HEMATOMA. 3. LUNGS APPEAR EXPANDED AND CLEAR WITHOUT A PULMONARY CONTUSION OR PNEUMOTHORAX. 4. DEGENERATIVE BONE SPURRING IN THE THORACIC SPINE AND NO THORACIC SPINE FRACTURE. 5. RIBS APPEAR INTACT AND NO CT EVIDENCE OF A DISPLACED RIB FRACTURE. Ct Abdomen Pelvis  : 6/29/2020   1. SOFT TISSUE CONTUSION INVOLVING THE RIGHT POSTEROLATERAL ABDOMINAL WALL. 2. 10 X 5 X 10 CM RIGHT POSTERIOR ABDOMINAL WALL HEMATOMA WITH L2 CHIP FRACTURE. 3. NO INTRAPERITONEAL OR RETROPERITONEAL HEMORRHAGE OR HEMATOMA. 4. NO ABDOMINAL ORGAN TRAUMATIC INJURY OR ABDOMINAL ORGAN LACERATION. 5. DEGENERATIVE BONE CHANGES IN THE LUMBAR SPINE AND NO LUMBAR SPINE FRACTURE.  6. NO PELVIC FRACTURE AND ADDITIONAL INCIDENTAL FINDINGS IN BODY OF REPORT. Xr Chest   6/29/2020    Lungs and pleura:  No consolidation. No pleural effusion. No pneumothorax. Normal pulmonary vascular pattern. Cardiomediastinal silhouette:  Stable cardiomediastinal silhouette. Other:  No acute osseous findings. Remote fracture deformity left proximal humerus. No acute radiographic abnormality. Us Carotid Artery   6/29/2020   . 1. No areas of estimated luminal stenosis greater than 0-49 percent within the bilateral internal carotid arteries. . 2. Scattered areas of calcified and noncalcified plaque bilateral internal carotid arteries and bilateral carotid artery bulb/bifurcations. GOSINK CRITERIA Diameter          PSV t            EDV t          PSV          EDV          Stenosis Site       %              cm/sec          cm/sec      ICA/CCA    ICA/CCA 0-49                 <124              <40             <2:1           ---                 --- 50-69              125-225                  >2:1           ---                 --- 70-89               225-325          >100          >4:1           >5:1              --- 90%+                >325              >100          >4:1           >9:1           Damped resistive CCA >95%                 Previous extensive, complex labs, notes and diagnostics reviewed and analyzed. ALLERGIES:    Allergies as of 07/01/2020 - Review Complete 07/01/2020   Allergen Reaction Noted    Cat hair extract Itching and Swelling 11/06/2018      (please also verify by checking [facility])     Complex Physical Medicine & Rehab Issues Assess & Plan:   1. Severe abnormality of gait and mobility and impaired self-care and ADL's secondary to progressive severe Parkinson's disease.   Functional and medical status reassessed regarding patients ability to participate in therapies and patient found to be able to participate in acute intensive comprehensive inpatient rehabilitation program including PT/OT to improve balance, ambulation, ADLs, and to improve the P/AROM. Therapeutic modifications regarding activities in therapies, place, amount of time per day and intensity of therapy made daily. In bed therapies or bedside therapies prn.   2. Bowel Parkinson's related constipation and Bladder dysfunction neurogenic bladder:  frequent toileting, ambulate to bathroom with assistance, check post void residuals. Check for C.difficile x1 if >2 loose stools in 24 hours, continue bowel & bladder program.  Monitor bowel and bladder function. Lactinex 2 PO every AC. MOM prn, Brown Bomb prn, Glycerin suppository prn, enema prn. Recurrent UTIs due to urinary retention and intolerance to Flomax at times because of low BP. Trial Proscar avoid Flomax due to low blood pressure check PVRs once UTI is treated and consider Ditropan first bladder spasms  3. Severe mid and low back parkinsonian stiffness related pain as well as generalized OA pain: reassess pain every shift and prior to and after each therapy session, give prn Tylenol and low-dose Norco avoiding any NSAIDs, modalities prn in therapy, Lidoderm, K-pad prn. Titrate to lowest effective dose of Norco  4. Skin healing bilateral upper extremity bruising and breakdown risk:  continue pressure relief program.  Daily skin exams and reports from nursing. 5. Severe fatigue due to nutritional and hydration deficiency: Add vitamin B12 vitamin D and CoQ10 continue to monitor I&Os, calorie counts prn, dietary consult prn.  6. Acute episodic insomnia with situational adjustment disorder:  prn Ambien, monitor for day time sedation. 7. Falls risk elevated:  patient to use call light to get nursing assistance to get up, bed and chair alarm. 8. Elevated DVT risk: progressive activities in PT, continue prophylaxis BRIAN hose, elevation and avoid all blood thinners due to recent anemia need for transfusion and myelodysplasia. 9. Complex discharge planning:  We will continue to focus on balance transitioning to lowest effective possible due to blood pressure issues.   Toilet frequently        Latasha Gorman D.O., PM&R     Attending    [address]

## 2020-07-09 NOTE — PROGRESS NOTES
Physical Therapy Rehab Treatment Note  [facility]       NAME: Lizeth Thomas  : 1940 ([de-identified] y.o.)  MRN: [id number]  CODE STATUS: Full Code    Date of Service: 2020  Chart Reviewed: Yes  Family / Caregiver Present: No    Restrictions:  Restrictions/Precautions: Fall Risk       SUBJECTIVE: Subjective: \" im doing better than yesterday\"     Pre Treatment Pain Screening  Pain at present: 4  Scale Used: Numeric Score  Intervention List: Patient able to continue with treatment    Post Treatment Pain Screenin/10  Pain Assessment  Pain Location: Back  Pain Orientation: Lower;Right  Pain Descriptors: Sore    OBJECTIVE:               Neuromuscular Education  PNF: Static Standing balance (unsupported feet in line, tandem) :60. x :30 (tandem), Stepping over object with continous walking x 120'. Weight shifting x 10   NDT Treatment: Standing;Gait   Neuromuscular Comments: PT displays reduced balance when right foot is forward. Transfers  Sit to Stand: Stand by assistance  Stand to sit: Stand by assistance    Ambulation  Ambulation?: Yes  Ambulation 1  Surface: carpet;level tile  Device: Single point cane  Assistance: Stand by assistance  Quality of Gait: Pt had good chilo, step height and length luis. As distance increased displayed fatigue near end of gait and had shortened distance with SPC, reduced step height and length. Distance: 350'(therapy loop)  Comments: VC in hallway to make room for oncoming and passing traffic. Activity Tolerance  Activity Tolerance: Patient Tolerated treatment well    Exercises  Hamstring Sets: x10 RTB   Hip Flexion: x 20 standing march  Hip Abduction: x20 RTB / x20 Adduction Ball squeezes   Knee Long Arc Quad: x20  Ankle Pumps: x20      ASSESSMENT/COMMENTS:  Body structures, Functions, Activity limitations: Decreased functional mobility ; Decreased strength;Decreased endurance;Decreased balance;Decreased coordination;Decreased safe awareness; Increased pain  Assessment: Pt tolerated treatment well this pm and displayed better balance during balance activities but had dificulty when right foot was forward. Pt demonstrated better ability to step over objects in stride with good foot clearance. PLAN OF CARE/Safety:   Safety Devices  Type of devices: All fall risk precautions in place      Therapy Time:   Individual   Time In 1300   Time Out 1400   Minutes 60     Minutes:60      Gait training: 15      Neuro re education: 15     Therapeutic ex:30      Anthony Segura 07/09/20 at 4:26 PM    This therapy session was supervised by Jalen Del Toro PTA.

## 2020-07-09 NOTE — PLAN OF CARE
Problem: Falls - Risk of:  Goal: Will remain free from falls  Description: Will remain free from falls  7/9/2020 0020 by Shalom Walton RN  Outcome: Ongoing  7/8/2020 1148 by Basilio Gerber RN  Outcome: Ongoing  Goal: Absence of physical injury  Description: Absence of physical injury  7/9/2020 0020 by Shalom Walton RN  Outcome: Ongoing  7/8/2020 1148 by Basilio Gerber RN  Outcome: Ongoing     Problem: Skin Integrity:  Goal: Will show no infection signs and symptoms  Description: Will show no infection signs and symptoms  7/9/2020 0020 by Shalom Walton RN  Outcome: Ongoing  7/8/2020 1148 by Basilio Gerber RN  Outcome: Ongoing  Goal: Absence of new skin breakdown  Description: Absence of new skin breakdown  7/9/2020 0020 by Shalom Walton RN  Outcome: Ongoing  7/8/2020 1148 by Basilio Gerber RN  Outcome: Ongoing     Problem: Mobility - Impaired:  Goal: Mobility will improve  Description: Mobility will improve  7/9/2020 0020 by Shalom Walton RN  Outcome: Ongoing  7/8/2020 1148 by Basilio Gerber RN  Outcome: Ongoing     Problem: IP BALANCE  Goal: LTG - Patient will maintain balance to allow for safe/functional mobility  7/9/2020 0020 by Shalom Walton RN  Outcome: Ongoing  7/8/2020 1148 by Basilio Gerber RN  Outcome: Ongoing     Problem: Pain:  Goal: Pain level will decrease  Description: Pain level will decrease  7/9/2020 0020 by Shalom Walton RN  Outcome: Ongoing  7/8/2020 1148 by Basilio Gerber RN  Outcome: Ongoing  Goal: Control of acute pain  Description: Control of acute pain  7/9/2020 0020 by Shalom Walton RN  Outcome: Ongoing  7/8/2020 1148 by Basilio Gerber RN  Outcome: Ongoing  Goal: Control of chronic pain  Description: Control of chronic pain  7/9/2020 0020 by Shalom Walton RN  Outcome: Ongoing  7/8/2020 1148 by Basilio Gerber RN  Outcome: Ongoing

## 2020-07-09 NOTE — PROGRESS NOTES
Facility/Department: [facility]  Daily Treatment Note  NAME: Jessica Mccarty  : 1940  MRN: [id number]    Date of Service: 2020    Discharge Recommendations:  Continue to assess pending progress       Assessment      Activity Tolerance  Activity Tolerance: Patient Tolerated treatment well         Patient Diagnosis(es): The primary encounter diagnosis was Decreased activities of daily living (ADL). A diagnosis of Gait abnormality due to Severe Parkinson's Disease with Blunt Chest Trauma; rehab admission 20. was also pertinent to this visit. has a past medical history of Actinic keratoses, Aortic valve stenosis, severe, BPH (benign prostatic hyperplasia), Cancer ([address] Colon polyp, Diastolic dysfunction, ED (erectile dysfunction), Glaucoma, Hemochromatosis, History of echocardiogram, History of echocardiogram, Hypertension, Iron deficiency anemia, LVH (left ventricular hypertrophy), Macular degeneration, Mass on back, Osteoarthritis cervical spine, and Parkinson disease ([address] has a past surgical history that includes Colonoscopy (2009); back surgery; Cataract removal; eye surgery (Bilateral); Tonsillectomy; other surgical history (06/08/15); and Cardiac catheterization (2017). Restrictions  Restrictions/Precautions  Restrictions/Precautions: Fall Risk     Subjective   General  Chart Reviewed: Yes  Patient assessed for rehabilitation services?: Yes  Response to previous treatment: Patient with no complaints from previous session  Family / Caregiver Present: Yes(Wife)  Referring Practitioner: Dr. Yazmin Tapia  Diagnosis: Impaired mobility and ADLs 2° to severe parkinson's disease with blunt chest trauma  Subjective  Subjective: I'll show you the wound. Pain Assessment  Pain Assessment: 0-10  Pain Level: 5  Pain Type: Acute pain  Pain Location: Back  Pain Orientation: Right; Lower  Pain Descriptors: Sore  Pre Treatment Pain Screening  Pain at present: 5  Scale Used: Numeric Score  Intervention List: Patient able to continue with treatment;Patient declined any intervention  Comments / Details: Pt. states he feels better than he did this morning  Vital Signs  Patient Currently in Pain: Yes     Orientation    WFL    Objective      Pt. tolerates B wrist weights at 1lb x 30 minutes. Pt. was able to assemble 1 large pipe tree design with min difficulty and increased time; pt. required min A for last piece due to decreased depth perception making completion of design difficult. ADL  Grooming: Independent(Washing hands at the sink)  Toileting: Independent  Additional Comments: Pt. independently stood at toilet to attempt urination. Pt. was unable to urinate but stood to manage pants and perform hygiene as though toileting, as above. Instrumental ADL's  Instrumental ADLs: Yes  Light Housekeeping  Light Housekeeping: Pt. stood at tabletop to fold laundry. Pt. was able to fold 10 towels and 10 wash cloths. Pt. required min increased time however was able to weight shift to R,L and forward in order to gather towels, fold them and place in stack. Pt. tolerates using BUE well with no LOB noted throughout this task. Functional Mobility  Functional - Mobility Device: Cane  Activity: To/from bathroom  Assist Level: Modified independent   Functional Mobility Comments: Pt. ambulated across therapy gym with cane to the bathroom in order to toilet with no difficulty. Pt. utilized 1lb dowel to bat ball back and forth with a BUE , seated in w/c. Pt. tolerated well with no rest breaks required. Pt. was able to use BUE together with G coordination and improving speed and accuracy as activity continued. Fine motor task completed with B hands assembling/disassembling a string of pop beads. Pt. had min difficulty and required increased time. Pt. able to manipulate beads in hand with no difficulty.     Plan   Plan  Times per week: 5-7x  Plan weeks: 1.5 weeks  Current Treatment

## 2020-07-09 NOTE — PROGRESS NOTES
Occupational Therapy     Facility/Department: [facility]  Daily Treatment Note  NAME: Nancy Millard  : 1940  MRN: [id number]    Date of Service: 2020    Discharge Recommendations:  Continue to assess pending progress       Assessment                Patient Diagnosis(es): There were no encounter diagnoses. has a past medical history of Actinic keratoses, Aortic valve stenosis, severe, BPH (benign prostatic hyperplasia), Cancer ([facility] [address] Colon polyp, Diastolic dysfunction, ED (erectile dysfunction), Glaucoma, Hemochromatosis, History of echocardiogram, History of echocardiogram, Hypertension, Iron deficiency anemia, LVH (left ventricular hypertrophy), Macular degeneration, Mass on back, Osteoarthritis cervical spine, and Parkinson disease ([facility] [address] has a past surgical history that includes Colonoscopy (2009); back surgery; Cataract removal; eye surgery (Bilateral); Tonsillectomy; other surgical history (06/08/15); and Cardiac catheterization (2017). Restrictions  Restrictions/Precautions  Restrictions/Precautions: Fall Risk  Subjective   General  Chart Reviewed: Yes  Patient assessed for rehabilitation services?: Yes  Response to previous treatment: Patient with no complaints from previous session  Family / Caregiver Present: Yes(Wife)  Referring Practitioner: Dr. Rosalind Schneider  Diagnosis: Impaired mobility and ADLs 2° to severe parkinson's disease with blunt chest trauma  Subjective  Subjective: I'll show you the wound. Pt reported right flank pain at times during exercises but light non-resistive exercises don't cause pain. Pt reported 4/10 pain prior to start of tx. Orientation   Ox3  Objective     Pt seen am engaged in light UE conditioning exercises using the ergometer. Pt tolerated ex well with breaks as needed. Pt completed unilateral tasks in standing tolerating 11 minutes of continuous standing prior to receiving a rest break. Pt required SBA throughout.          Plan   Plan  Times per week: 5-7x  Plan weeks: 1.5 weeks  Current Treatment Recommendations: Strengthening, Functional Mobility Training, Patient/Caregiver Education & Training, Home Management Training, Cognitive/Perceptual Training, Pain Management, Balance Training, Neuromuscular Re-education, Self-Care / ADL, Safety Education & Training, Endurance Training  Plan Comment: Continue per OT POC    Goals  Patient Goals   Patient goals : \"I want to get back to independence\"       Therapy Time   Individual Concurrent Group Co-treatment   Time In 0930         Time Out 1000         Minutes 30                 Dianelys Vazquez OTR/L

## 2020-07-10 PROCEDURE — APPSS15 APP SPLIT SHARED TIME 0-15 MINUTES: Performed by: NURSE PRACTITIONER

## 2020-07-10 PROCEDURE — 99232 SBSQ HOSP IP/OBS MODERATE 35: CPT | Performed by: PSYCHIATRY & NEUROLOGY

## 2020-07-10 PROCEDURE — 97530 THERAPEUTIC ACTIVITIES: CPT

## 2020-07-10 PROCEDURE — 97535 SELF CARE MNGMENT TRAINING: CPT

## 2020-07-10 PROCEDURE — 97116 GAIT TRAINING THERAPY: CPT

## 2020-07-10 PROCEDURE — 99232 SBSQ HOSP IP/OBS MODERATE 35: CPT | Performed by: PHYSICAL MEDICINE & REHABILITATION

## 2020-07-10 PROCEDURE — 6370000000 HC RX 637 (ALT 250 FOR IP): Performed by: PHYSICAL MEDICINE & REHABILITATION

## 2020-07-10 PROCEDURE — 6370000000 HC RX 637 (ALT 250 FOR IP)

## 2020-07-10 PROCEDURE — 6370000000 HC RX 637 (ALT 250 FOR IP): Performed by: INTERNAL MEDICINE

## 2020-07-10 PROCEDURE — 1180000000 HC REHAB R&B

## 2020-07-10 PROCEDURE — 97112 NEUROMUSCULAR REEDUCATION: CPT

## 2020-07-10 PROCEDURE — 97110 THERAPEUTIC EXERCISES: CPT

## 2020-07-10 RX ORDER — DIAPER,BRIEF,INFANT-TODD,DISP
EACH MISCELLANEOUS 4 TIMES DAILY PRN
Status: DISCONTINUED | OUTPATIENT
Start: 2020-07-10 | End: 2020-07-12 | Stop reason: HOSPADM

## 2020-07-10 RX ORDER — CHOLECALCIFEROL (VITAMIN D3) 125 MCG
CAPSULE ORAL
Status: DISCONTINUED
Start: 2020-07-10 | End: 2020-07-10

## 2020-07-10 RX ORDER — FINASTERIDE 5 MG/1
5 TABLET, FILM COATED ORAL DAILY
Status: DISCONTINUED | OUTPATIENT
Start: 2020-07-10 | End: 2020-07-12 | Stop reason: HOSPADM

## 2020-07-10 RX ORDER — CHOLECALCIFEROL (VITAMIN D3) 125 MCG
CAPSULE ORAL
Status: COMPLETED
Start: 2020-07-10 | End: 2020-07-10

## 2020-07-10 RX ADMIN — MENTHOL AND METHYL SALICYLATE: 7.6; 29 OINTMENT TOPICAL at 20:33

## 2020-07-10 RX ADMIN — LACTOBACILLUS TAB 2 TABLET: TAB at 08:59

## 2020-07-10 RX ADMIN — LACTOBACILLUS TAB 2 TABLET: TAB at 15:46

## 2020-07-10 RX ADMIN — NYSTATIN 500000 UNITS: 100000 SUSPENSION ORAL at 20:33

## 2020-07-10 RX ADMIN — FERROUS SULFATE TAB 325 MG (65 MG ELEMENTAL FE) 325 MG: 325 (65 FE) TAB at 17:40

## 2020-07-10 RX ADMIN — DROXIDOPA 100 MG: 100 CAPSULE ORAL at 15:47

## 2020-07-10 RX ADMIN — VITAMIN D, TAB 1000IU (100/BT) 2000 UNITS: 25 TAB at 17:40

## 2020-07-10 RX ADMIN — FINASTERIDE 5 MG: 5 TABLET, FILM COATED ORAL at 08:59

## 2020-07-10 RX ADMIN — LACTOBACILLUS TAB 2 TABLET: TAB at 20:33

## 2020-07-10 RX ADMIN — FERROUS SULFATE TAB 325 MG (65 MG ELEMENTAL FE) 325 MG: 325 (65 FE) TAB at 08:59

## 2020-07-10 RX ADMIN — CARBIDOPA AND LEVODOPA 1 TABLET: 25; 100 TABLET ORAL at 20:33

## 2020-07-10 RX ADMIN — CYANOCOBALAMIN TAB 500 MCG 1000 MCG: 500 TAB at 09:16

## 2020-07-10 RX ADMIN — NYSTATIN 500000 UNITS: 100000 SUSPENSION ORAL at 09:01

## 2020-07-10 RX ADMIN — Medication 100 MG: at 06:00

## 2020-07-10 RX ADMIN — CIPROFLOXACIN 500 MG: 500 TABLET, FILM COATED ORAL at 20:33

## 2020-07-10 RX ADMIN — NYSTATIN 500000 UNITS: 100000 SUSPENSION ORAL at 15:49

## 2020-07-10 RX ADMIN — CARBIDOPA AND LEVODOPA 1 TABLET: 25; 100 TABLET ORAL at 08:59

## 2020-07-10 RX ADMIN — DROXIDOPA 100 MG: 100 CAPSULE ORAL at 09:01

## 2020-07-10 RX ADMIN — CARBIDOPA AND LEVODOPA 1 TABLET: 25; 100 TABLET ORAL at 15:46

## 2020-07-10 RX ADMIN — MENTHOL AND METHYL SALICYLATE: 7.6; 29 OINTMENT TOPICAL at 12:50

## 2020-07-10 RX ADMIN — CIPROFLOXACIN 500 MG: 500 TABLET, FILM COATED ORAL at 08:59

## 2020-07-10 RX ADMIN — FOLIC ACID 1000 MCG: 1 TABLET ORAL at 08:59

## 2020-07-10 RX ADMIN — LATANOPROST 1 DROP: 50 SOLUTION OPHTHALMIC at 20:33

## 2020-07-10 RX ADMIN — DROXIDOPA 100 MG: 100 CAPSULE ORAL at 20:42

## 2020-07-10 RX ADMIN — Medication 100 MG: at 15:46

## 2020-07-10 ASSESSMENT — ENCOUNTER SYMPTOMS
WHEEZING: 0
SHORTNESS OF BREATH: 0
COLOR CHANGE: 0
COUGH: 0
NAUSEA: 0
CHEST TIGHTNESS: 0
TROUBLE SWALLOWING: 0
VOMITING: 0

## 2020-07-10 ASSESSMENT — PAIN DESCRIPTION - PAIN TYPE: TYPE: ACUTE PAIN

## 2020-07-10 ASSESSMENT — PAIN DESCRIPTION - ORIENTATION
ORIENTATION: LOWER;RIGHT
ORIENTATION: LOWER

## 2020-07-10 ASSESSMENT — PAIN DESCRIPTION - DESCRIPTORS
DESCRIPTORS: SORE
DESCRIPTORS: SORE

## 2020-07-10 ASSESSMENT — PAIN DESCRIPTION - LOCATION
LOCATION: BACK
LOCATION: BACK

## 2020-07-10 ASSESSMENT — PAIN DESCRIPTION - PROGRESSION: CLINICAL_PROGRESSION: NOT CHANGED

## 2020-07-10 ASSESSMENT — PAIN SCALES - GENERAL
PAINLEVEL_OUTOF10: 0
PAINLEVEL_OUTOF10: 4

## 2020-07-10 ASSESSMENT — PAIN DESCRIPTION - FREQUENCY: FREQUENCY: CONTINUOUS

## 2020-07-10 ASSESSMENT — PAIN DESCRIPTION - ONSET: ONSET: ON-GOING

## 2020-07-10 NOTE — PLAN OF CARE
Problem: Falls - Risk of:  Goal: Will remain free from falls  Description: Will remain free from falls  7/10/2020 1300 by Hugo Sykes RN  Outcome: Ongoing  7/10/2020 0152 by Odilia Bahena RN  Outcome: Ongoing  Goal: Absence of physical injury  Description: Absence of physical injury  7/10/2020 1300 by Hugo Sykes RN  Outcome: Ongoing  7/10/2020 0152 by Odilia Bahena RN  Outcome: Ongoing     Problem: Skin Integrity:  Goal: Will show no infection signs and symptoms  Description: Will show no infection signs and symptoms  7/10/2020 1300 by Hugo Sykes RN  Outcome: Ongoing  7/10/2020 0152 by Odilia Bahena RN  Outcome: Ongoing  Goal: Absence of new skin breakdown  Description: Absence of new skin breakdown  7/10/2020 1300 by Hugo Sykes RN  Outcome: Ongoing  7/10/2020 0152 by Odilia Bahena RN  Outcome: Ongoing     Problem: Mobility - Impaired:  Goal: Mobility will improve  Description: Mobility will improve  7/10/2020 1300 by Hugo Sykes RN  Outcome: Ongoing  7/10/2020 0152 by Odilia Bahena RN  Outcome: Ongoing     Problem: IP COMMUNICATION/DYSARTHRIA  Goal: LTG - patient will improve expressive language skills to allow for communication of wants and needs in daily activities  7/10/2020 1300 by Hugo Sykes RN  Outcome: Ongoing  7/10/2020 0152 by Odilia Bahena RN  Outcome: Ongoing  Goal: LTG - patient will utilize compensatory intervention for intelligibility  7/10/2020 1300 by Hugo Sykes RN  Outcome: Ongoing  7/10/2020 0152 by Odilia Bahena RN  Outcome: Ongoing     Problem: IP SWALLOWING  Goal: LTG - patient will tolerate the least restrictive diet consistency to allow for safe consumption of daily meals  7/10/2020 1300 by Hugo Skyes RN  Outcome: Ongoing  7/10/2020 0152 by Odilia Bahena RN  Outcome: Ongoing     Problem: IP BALANCE  Goal: LTG - Patient will maintain balance to allow for safe/functional mobility  7/10/2020 1300 by Hugo Sykes RN  Outcome: Ongoing  7/10/2020 0152 by Corey Zhu RN  Outcome: Ongoing     Problem: Pain:  Goal: Pain level will decrease  Description: Pain level will decrease  7/10/2020 1300 by Shaggy Carmona RN  Outcome: Ongoing  7/10/2020 0152 by Corey Zhu RN  Outcome: Ongoing  Goal: Control of acute pain  Description: Control of acute pain  7/10/2020 1300 by Shaggy Carmona RN  Outcome: Ongoing  7/10/2020 0152 by Corey Zhu RN  Outcome: Ongoing  Goal: Control of chronic pain  Description: Control of chronic pain  7/10/2020 1300 by Shaggy Carmona RN  Outcome: Ongoing  7/10/2020 0152 by Corey Zhu RN  Outcome: Ongoing

## 2020-07-10 NOTE — PROGRESS NOTES
Physical Therapy Rehab Treatment Note  Facility/Department: [facility]  Room: [place] [address]       NAME: Milton Beltre  : 1940 ([de-identified] y.o.)  MRN: [id number]  CODE STATUS: Full Code    Date of Service: 7/10/2020  Chart Reviewed: Yes  Family / Caregiver Present: No    Restrictions:  Restrictions/Precautions: Fall Risk       SUBJECTIVE:       Pre Treatment Pain Screening  Pain at present: 4  Scale Used: Numeric Score  Intervention List: Patient able to continue with treatment  Comments / Details: Pt c/o of right lower back pain    Post Treatment Pain Screenin/10  Pain Assessment  Pain Location: Back  Pain Orientation: Lower;Right  Pain Descriptors: Sore    OBJECTIVE:                   Transfers  Sit to Stand: Stand by assistance  Stand to sit: Stand by assistance    Ambulation  Ambulation?: Yes  Ambulation 1  Surface: carpet;level tile  Device: Single point cane  Assistance: Supervision  Quality of Gait: Pt with good consistent chilo, step height and step length. Distance: 250'    Stairs  Curbs: 4\"(2\" then 4\" curbs)  Device: [employer]  Assistance: Stand by assistance  Comment: Pt able to ascend and descend with continous cuing for technique. Left foot for ascent and descent, SPC in right. Activity Tolerance  Activity Tolerance: Patient Tolerated treatment well  Exercises  Knee Long Arc Quad: x20  Ankle Pumps: x20    ASSESSMENT/COMMENTS:  Body structures, Functions, Activity limitations: Decreased functional mobility ; Decreased strength;Decreased endurance;Decreased balance;Decreased coordination;Decreased safe awareness; Increased pain  Assessment: Pt tolerated treatment well today and felt less dizziness when standing up. Pt displayed good ability to safely step up on two and four inch curbs with SBA. Pt did require continous VC to complete curb steps. Pt displays increases swelling luis. LE. Notified nurse about luis. swelling and had pt sitting with feet elevated in comfortable position. PLAN OF CARE/Safety:   Safety Devices  Type of devices: All fall risk precautions in place      Therapy Time:   Individual   Time In 1130   Time Out 1200   Minutes 30     Minutes:30            Gait training: 10      Neuro re education: 15     Therapeutic ex: Oren Jhaveri 07/10/20 at 12:09 PM    This therapy session was supervised by Robert Castaneda PTA.

## 2020-07-10 NOTE — PROGRESS NOTES
[facility] Neurology Daily Progress Note  Name: Yudy Whitt  Age: [de-identified] y.o. Gender: male  CodeStatus: Full Code  Allergies: Cat Hair Extract    Chief Complaint:No chief complaint on file. Primary Care Provider: Lucy Oneal MD  InpatientTreatment Team: Treatment Team: Attending Provider: Collin Luna DO; Consulting Physician: Sonia Romano MD; Consulting Physician: Aureliano Borrego MD; Consulting Physician: George Riddle MD; Registered Nurse: Kath Beltre, RN; Patient Care Tech: Carrie Synthego; Registered Nurse: Tonja Harada, YAIR; Occupational Therapist Assistant: TAYLOR Farooq; Registered Nurse: Jacob Valencia RN  Admission Date: 7/1/2020      HPI Pt seen and examined on rehab unit for neuro follow up Parkinson's disease and syncope secondary to significant orthostatic hypotension due to autonomic dysfunction. .  Continues to have symptomatic orthostatic hypotension. Parkinson symptoms well managed on current dose of Sinemet. Drain discontinued on 7/9/2020 and Dianna Mater started on 7/10/2020. Vitals:    07/10/20 0816   BP: (!) 150/79   Pulse: 69   Resp:    Temp: 98 °F (36.7 °C)   SpO2: 98%      Review of Systems   Constitutional: Negative for appetite change, chills, fatigue and fever. HENT: Negative for hearing loss and trouble swallowing. Eyes: Negative for visual disturbance. Respiratory: Negative for cough, chest tightness, shortness of breath and wheezing. Cardiovascular: Negative for chest pain, palpitations and leg swelling. Gastrointestinal: Negative for nausea and vomiting. Musculoskeletal: Positive for gait problem. Skin: Negative for color change and rash. Neurological: Positive for dizziness and light-headedness. Negative for tremors, seizures, syncope, facial asymmetry, speech difficulty, weakness, numbness and headaches. Psychiatric/Behavioral: Negative for agitation, confusion and hallucinations. The patient is not nervous/anxious. 8.7*   HCT 25.8*        No results for input(s): NA, K, CL, CO2, BUN, CREATININE, CALCIUM, PHOS in the last 72 hours. Invalid input(s): MAGNES  No results for input(s): AST, ALT, BILIDIR, BILITOT, ALKPHOS in the last 72 hours. No results for input(s): INR in the last 72 hours. No results for input(s): Burnadette Lundborg in the last 72 hours. Urinalysis:   Lab Results   Component Value Date    NITRU Negative 2020    WBCUA 0-2 2017    BACTERIA Few 2017    RBCUA 0-2 2017    BLOODU Negative 2020    SPECGRAV 1.007 2020    GLUCOSEU Negative 2020    GLUCOSEU NEG 2011       Radiology:   Most recent    EEG No procedure found. MRI of Brain No results found for this or any previous visit. No results found for this or any previous visit. MRA of the Head and Neck: No results found for this or any previous visit. No results found for this or any previous visit. No results found for this or any previous visit. CT of the Head:   Results for orders placed during the hospital encounter of 20   [address]    Narrative Patient MRN: [id number]    : 1940    Age:  [de-identified] years    Order Date: 2020 11:00 AM.    Gender: Male    Exam: CT HEAD WO CONTRAST    Number of Images: 183    Indication: Syncope, collapse that began 90 minutes ago. Contrast dosage: None    Comparison: 2018 at CT    Findings: This examination was performed on a CT scanner with dose reduction. Technique: Low-dose CT  acquisition technique included one of following options; 1 . Automated exposure control, 2. Adjustment of MA and or KV according to patient's size or 3. Use of iterative reconstruction. Vascular calcifications within the bilateral internal carotid artery cavernous segments and the vertebral arteries. . Moderate to moderately severe cerebral volume loss/atrophy.  No subfalcine, transtentorial or tonsillar herniation or shift. No   intraparenchymal hemorrhage. No extra-axial fluid collection or hematoma. Hypodensities bilateral centrum semiovale and periventricular regions. Empty sella. No Chiari malformation. Mucosal disease left maxillary sinus. No acute fracture or lytic or blastic bony lesion. Postoperative changes left orbit/optic globe. Airways and paranasal sinuses grossly unremarkable. Impression 1. No CT evidence of acute intracranial abnormality, as questioned. 2. Moderate to moderately severe cerebral volume loss/atrophy with white matter changes consistent with sequelae small vessel ischemic disease. .  3. Vascular calcifications within the bilateral internal carotid artery cavernous segments and the vertebral arteries. .  4. Empty sella. No results found for this or any previous visit. No results found for this or any previous visit. Carotid duplex: No results found for this or any previous visit. No results found for this or any previous visit. Results for orders placed during the hospital encounter of 20   US CAROTID ARTERY BILATERAL    Narrative    Patient MRN:  [id number]    : 1940    Age: [de-identified] years    Gender: Male    Order Date:  2020 2:00 PM    EXAM: US CAROTID ARTERY BILATERAL    NUMBER OF IMAGES:  55    INDICATION:  syncope, vertigo      COMPARISON: Carotid artery ultrasound 2017    FINDINGS:   Right side:  Proximal common carotid artery peak systolic velocity is 381 centimeters per second  Mid, common carotid artery peak systolic velocity is 117 centimeters per second. Distal common carotid artery peak systolic velocity is 14.9 centimeters per second    External carotid artery peak systolic velocity is 71.0 centimeters per second. Proximal internal carotid artery peak systolic velocity is 02.8 centimeters per second  Mid internal carotid artery peak systolic velocity is 15.3 centimeters per second.   Distal internal carotid artery peak systolic velocity is 39.4 centimeters per second    Vertebral artery peak systolic velocity is 12.0 centimeters per second. Vertebral artery flow is antegrade    ICA/CCA ratio is 0.85    Left side:  Proximal common carotid artery peak systolic velocity is 150 centimeters per second  Mid, common carotid artery peak systolic velocity is 885 centimeters per second. Distal common carotid artery peak systolic velocity is 26.8 centimeters per second. External carotid artery peak systolic velocity is 76.9 centimeters per second. Proximal internal carotid artery peak systolic velocity is 26.5 centimeters per second  Mid internal carotid artery peak systolic velocity is 20.4 centimeters per second. Distal internal carotid artery peak systolic velocity is 21.3 centimeters per second    Vertebral artery peak systolic velocity is 40.3 centimeters per second. Vertebral artery flow is antegrade    ICA/CCA ratio is 0.78. Scattered areas of calcified and noncalcified plaque bilateral internal carotid arteries and bilateral carotid bulb/bifurcations. Impression Peak systolic velocities, ICA/CCA ratios and antegrade vertebral artery flow as above. See above for details of the examination and below for internal carotid artery interpretation guidelines. 1. No areas of estimated luminal stenosis greater than 0-49 percent within the bilateral internal carotid arteries. .  2. Scattered areas of calcified and noncalcified plaque bilateral internal carotid arteries and bilateral carotid artery bulb/bifurcations.     GOSINK CRITERIA    Diameter          PSV t            EDV t          PSV          EDV          Stenosis Site        %              cm/sec          cm/sec      ICA/CCA    ICA/CCA    0-49                 <124              <40             <2:1           ---                 ---    50-69              125-225                  >2:1           ---                 ---    70-89               225-325          >100          >4:1           >5:1 ---    90%+                >325              >100          >4:1           >9:1           Damped resistive CCA    >95%               May be            May be      Damped      ---              Damped resistive CCA                        decreased      decreased  resistive CCA      Validated velocity measurements with angiographic measurements, velocity criteria are extrapolated from diameter data as defined by the Society of Radiologist in [address] [place] [address] [address] Radiology 2003; [id number]. Echo No results found for this or any previous visit. Assessment/Plan:    Syncope in the setting of hypotension and acute blood loss anemia secondary to abdominal wall hematoma  Patient with history of Parkinson's disease and primary autonomic dysfunction with orthostatic hypotension  Carotid duplex less than 50% stenosis bilaterally  Continue Sinemet, good symptom management at this time  Midodrine discontinued 7/9/2020 and Sheria Shlomo started 7/10/2020  BRIAN hose  Assess orthostatic blood pressures daily. Will need close monitoring of baseline blood pressures to monitor for hypertension as Sheria Shlomo is contraindicated with significant baseline hypertension  Continue PT/OT    I independently performed an evaluation on this patient. I have reviewed the above documentation completed by the Nurse Practitioner. Please see my additional contributions to the HPI, physical exam, assessment/medical decision making. Jared Allen MD, [address] American Board of Psychiatry & Neurology  Board Certified in Vascular Neurology  Board Certified in Neuromuscular Medicine  Certified in Neurorehabilitation         Collaborating physicians: Dr Khoi Allen    Electronically signed by NANCY Duke CNP on 7/10/2020 at 3:40 PM

## 2020-07-10 NOTE — PROGRESS NOTES
Occupational Therapy  [facility]/Department: [facility]  Daily Treatment Note  NAME: Julissa Waller  : 1940  MRN: [id number]    Date of Service: 7/10/2020    Discharge Recommendations:  Continue to assess pending progress       Assessment      Activity Tolerance  Activity Tolerance: Patient Tolerated treatment well  Safety Devices  Safety Devices in place: Yes  Type of devices: All fall risk precautions in place         Patient Diagnosis(es): The primary encounter diagnosis was Decreased activities of daily living (ADL). A diagnosis of Gait abnormality due to Severe Parkinson's Disease with Blunt Chest Trauma; rehab admission 20. was also pertinent to this visit. has a past medical history of Actinic keratoses, Aortic valve stenosis, severe, BPH (benign prostatic hyperplasia), Cancer ([address] Colon polyp, Diastolic dysfunction, ED (erectile dysfunction), Glaucoma, Hemochromatosis, History of echocardiogram, History of echocardiogram, Hypertension, Iron deficiency anemia, LVH (left ventricular hypertrophy), Macular degeneration, Mass on back, Osteoarthritis cervical spine, and Parkinson disease ([address] has a past surgical history that includes Colonoscopy (2009); back surgery; Cataract removal; eye surgery (Bilateral); Tonsillectomy; other surgical history (06/08/15); and Cardiac catheterization (2017).     Restrictions  Restrictions/Precautions  Restrictions/Precautions: Fall Risk     Subjective   General  Chart Reviewed: Yes  Patient assessed for rehabilitation services?: Yes  Response to previous treatment: Patient with no complaints from previous session  Family / Caregiver Present: Yes  Referring Practitioner: Dr. Aldo Joseph  Diagnosis: Impaired mobility and ADLs 2° to severe parkinson's disease with blunt chest trauma    Subjective  Subjective:      Pain Assessment  Pain Level: 0  Pre Treatment Pain Screening  Pain at present: 0     Orientation  Orientation  Overall Orientation Status: Within Functional Limits     Objective      Upper Extremity Function  UE Strengthing: UE Strengthening: Patient engaged in B UE ROM/strengthening to increase I with ADL's and transfers. Patient provided written HEP. Patient able to utilize 1 # hand weight 1 X 10 repetitions in various planes. Patient required MIN verbal cues for proper technique. Patient required 0 verbal cues to keep correct count. RB's as needed.        Plan   Plan  Times per week: 5-7x  Plan weeks: 1.5 weeks  Current Treatment Recommendations: Strengthening, Functional Mobility Training, Patient/Caregiver Education & Training, Home Management Training, Cognitive/Perceptual Training, Pain Management, Balance Training, Neuromuscular Re-education, Self-Care / ADL, Safety Education & Training, Endurance Training  Plan Comment: Continue per OT POC     Goals  Patient Goals   Patient goals : \"I want to get back to independence\"       Therapy Time   Individual Concurrent Group Co-treatment   Time In 1400         Time Out 1430         Minutes 30             Therapeutic activities: 30 minutes       Electronically signed by TAYLOR Montejo on 7/10/20 at 2:50 PM EDT      TAYLOR Montejo

## 2020-07-10 NOTE — PROGRESS NOTES
Patient bilateral lower legs 4+ edema noted. 4 inch ace wraps applied to each lower leg per YAIR Streeter. Legs elevated and lower bed gatched.

## 2020-07-10 NOTE — PROGRESS NOTES
walking and had a slight LOB, pt able to self correct. PLAN OF CARE/Safety:   Safety Devices  Type of devices: All fall risk precautions in place      Therapy Time:   Individual   Time In 1300   Time Out 1400   Minutes 60     Minutes:60        Gait trainin      Neuro re education: 5     Therapeutic ex: 30      Lesvia Pritchett 07/10/20 at 4:36 PM    This therapy session was supervised by Kristin Ojeda PTA.

## 2020-07-10 NOTE — PROGRESS NOTES
Subjective: The patient complains of severe  acute on chronic progressive weakness secondary to anemia partially relieved by blood transfusions, rest, PT, OT and exacerbated by current decline in H&H. His impulsivity seems to be improving as his UTI is treated are still considering the possible use of Ditropan after we check his postvoid residual again I will please have nursing reported to me and leave it on my phone line. I discussed Proscar with urology I see that they did not started I will go ahead and start it for them. Enterococcus faecalis (1)     Antibiotic Interpretation CEE Status   ampicillin Sensitive <=2 mcg/mL    ciprofloxacin Sensitive 1 mcg/mL    nitrofurantoin Sensitive <=16 mcg/mL    vancomycin Sensitive 2 mcg/mL          ROS x10: The patient also complains of severely impaired mobility and activities of daily living. Otherwise no new problems with vision, hearing, nose, mouth, throat, dermal, cardiovascular, GI, , pulmonary, musculoskeletal, psychiatric or neurological. See Rehab H&P on Rehab chart dated . Vital signs:  BP (!) 150/79   Pulse 69   Temp 98 °F (36.7 °C)   Resp 18   Ht 6' (1.829 m)   Wt 168 lb (76.2 kg)   SpO2 98%   BMI 22.78 kg/m²   I/O:   PO/Intake:  fair PO intake, no problems observed or reported. Bowel/Bladder:  continent, urinary urgency and acute UTI-history of retention  General:  Patient is well developed, adequately nourished, non-obese and     well kempt. HEENT:    PERRLA, hearing intact to loud voice, external inspection of ear     and nose benign. Inspection of lips, tongue and gums benign  Musculoskeletal: No significant change in strength or tone. All joints stable. Inspection and palpation of digits and nails show no clubbing,       cyanosis or inflammatory conditions. Neuro/Psychiatric: Affect: flat but pleasant. Alert and oriented to person, place and     situation.   No significant change in deep tendon reflexes or     Sensation-poor insight into his deficits  Lungs:  Diminished, CTA-B. Respiration effort is normal at rest.     Heart:   S1 = S2, RRR. No loud murmurs. Abdomen:  Soft, non-tender, no enlargement of liver or spleen. Extremities:  No significant lower extremity edema or tenderness. Skin:   Intact to general survey, no visualized or palpated problems-chest bruising. Rehabilitation:  Physical therapy: FIMS:  Bed Mobility: Scooting: Stand by assistance    Transfers: Sit to Stand: Stand by assistance  Stand to sit: Stand by assistance  Bed to Chair: Contact guard assistance, Ambulation 1  Surface: carpet, level tile  Device: Single point cane  Assistance: Stand by assistance  Quality of Gait: Pt had good chilo, step height and length luis. As distance increased displayed fatigue near end of gait and had shortened distance with SPC, reduced step height and length. Gait Deviations: Slow Chilo, Increased ALEN, Decreased step length  Distance: 350'(therapy loop)  Comments: VC in hallway to make room for oncoming and passing traffic., Stairs  # Steps : 8  Stairs Height: 6\"  Rails: Bilateral  Assistance: Contact guard assistance  Comment: Pt able to acend and descend stairs recip     FIMS:  ,  , Assessment: Pt tolerated treatment well this pm and displayed better balance during balance activities but had dificulty when right foot was forward. Pt demonstrated better ability to step over objects in stride with good foot clearance. Occupational therapy: FIMS:   ,  , Assessment: Pt. demonstrates G strength but continues to be limited by fatigue. Continues to benefit from OT to maximize independence with ADL tasks. Speech therapy: FIMS:        Lab/X-ray studies reviewed, analyzed and discussed with patient and staff:   No results found for this or any previous visit (from the past 24 hour(s)). Xr Lumbar Spine   6/29/2020    Nonspecific bowel gas pattern. Vascular calcifications noted abdominal aorta. prn Ambien, monitor for day time sedation. 7. Falls risk elevated:  patient to use call light to get nursing assistance to get up, bed and chair alarm. 8. Elevated DVT risk: progressive activities in PT, continue prophylaxis BRIAN hose, elevation and avoid all blood thinners due to recent anemia need for transfusion and myelodysplasia. 9. Complex discharge planning: We will continue to focus on balance transitioning to lowest effective dose of any opiate medication independence and pain management as patient progresses toward discharge 7/12/2020 home with his wife and home health care. He is status post weekly team meeting every Monday to assess progress towards goals, discuss and address social, psychological and medical comorbidities and to address difficulties they may be having progressing in therapy. Patient and family education is in progress. The patient is to follow-up with their family physician after discharge. Complex Active General Medical Issues that complicate care Assess & Plan:    1. Hypertension with   Orthostasis, Aortic valve stenosis, severe, LVH (left ventricular hypertrophy), Diastolic dysfunction-vital signs every shift dose and titrate cardiac medications to include ProAmatine consult hospitalist for backup medical-recheck CBC monitor closely for bleeding  2. Macular degeneration with   Blindness  right eye-usual cues to the right  3. BPH (benign prostatic hyperplasia)-check post void residuals and check recheck UA  4.   PD (Parkinson's disease) -monitor for orthostasis treat comorbidities including pain constipation rigid rigidity and cognitive decline consult neurology and titrate dopaminergic medications  5. MDS (myelodysplastic syndrome), low grade -add B12 recheck CBC add iron with food add Folvite transfuse as needed transfusion scheduled for 7/3/2020  6.    Closed fracture of body of lumbar vertebra with active and chronic pain-add Lidoderm add high-dose vitamin

## 2020-07-10 NOTE — PROGRESS NOTES
Pt is AO x 3 and pleasant, his impulsiveness appears to be more severe at night per report as he will not always indicate when he needs to use the restroom, no c/o pain this morning but his pain is being managed by PRN Tylenol IcyHot and lidocine patches, noted a slight red scaly rash to bilat knuckles that is new obtained order from Dr Jo Jean for hydrocortisone cream, spoke with Nuzhat Milan regaring Droxidopa if pt systolic greater than 912 diastolic greater than 791 we are to contact neurology prior to giving this med d/t risk of stroke nursing communication added, LBM 7/9 call light within reach for pt safety chair alarm engaged for pt safety Electronically signed by Claire Mack RN on 7/10/2020 at 4:01 PM

## 2020-07-10 NOTE — PROGRESS NOTES
Patient sleeping at intervals. Up to BR frequently. Continent. Has [employer] d/t not always calling for assistance.

## 2020-07-10 NOTE — PROGRESS NOTES
Occupational Therapy  Facility/Department: [facility]  Daily Treatment Note  NAME: Anuel Nazario  : 1940  MRN: [id number]    Date of Service: 7/10/2020    Discharge Recommendations:  Continue to assess pending progress    Assessment  Activity Tolerance  Activity Tolerance: Patient Tolerated treatment well  Safety Devices  Safety Devices in place: Yes  Type of devices: All fall risk precautions in place       Patient Diagnosis(es): The primary encounter diagnosis was Decreased activities of daily living (ADL). A diagnosis of Gait abnormality due to Severe Parkinson's Disease with Blunt Chest Trauma; rehab admission 20. was also pertinent to this visit. has a past medical history of Actinic keratoses, Aortic valve stenosis, severe, BPH (benign prostatic hyperplasia), Cancer ([address] Colon polyp, Diastolic dysfunction, ED (erectile dysfunction), Glaucoma, Hemochromatosis, History of echocardiogram, History of echocardiogram, Hypertension, Iron deficiency anemia, LVH (left ventricular hypertrophy), Macular degeneration, Mass on back, Osteoarthritis cervical spine, and Parkinson disease ([address] has a past surgical history that includes Colonoscopy (2009); back surgery; Cataract removal; eye surgery (Bilateral); Tonsillectomy; other surgical history (06/08/15); and Cardiac catheterization (2017).     Restrictions  Restrictions/Precautions  Restrictions/Precautions: Fall Risk     Subjective   General  Chart Reviewed: Yes  Patient assessed for rehabilitation services?: Yes  Response to previous treatment: Patient with no complaints from previous session  Family / Caregiver Present: Yes  Referring Practitioner: Dr. Frederica Nyhan  Diagnosis: Impaired mobility and ADLs 2° to severe parkinson's disease with blunt chest trauma  Subjective  Subjective:    Pain Assessment  Pain Assessment: 0-10  Pain Level: 4  Pain Type: Acute pain  Pain Location: Back  Pain Orientation: Lower  Pain Descriptors: Sore  Pain Frequency: Continuous  Pain Onset: On-going  Clinical Progression: Not changed  Non-Pharmaceutical Pain Intervention(s): Rest  Pre Treatment Pain Screening  Pain at present: 5  Scale Used: Numeric Score  Intervention List: Patient able to continue with treatment;Patient declined any intervention  Vital Signs  Patient Currently in Pain: Yes     Orientation  Orientation  Overall Orientation Status: Within Functional Limits     Objective  ADL shower session completed as follows. ADL  Grooming: Independent(Pt sat at sink to perform oral care and hair care)  UE Bathing: Modified independent (Use of detachable shower head)  LE Bathing: Modified independent (Pt utilized grab bars)  UE Dressing: Independent(To doff/don t-shirt and pull over sweater)  LE Dressing: Modified independent (To doff/don underwear, pants, and socks)        Balance  Sitting Balance: Independent  Standing Balance: Modified independent   Functional Mobility  Functional - Mobility Device: Cane  Activity: To/from bathroom; Retrieve items  Assist Level: Modified independent   Functional Mobility Comments: Pt gathered clothes from closet prior to shower. Shower Transfers  Shower - Transfer From: [employer] - Transfer Type: To and From  Shower - Transfer To: Shower seat with back  Shower - Technique: Ambulating  Shower Transfers: Modified independence  Shower Transfers Comments: Pt utilized grab bars                             Cognition  Overall Cognitive Status: WFL  Arousal/Alertness: Appropriate responses to stimuli  Following Commands: Follows all commands without difficulty  Attention Span: Appears intact  Memory: Appears intact  Safety Judgement: Good awareness of safety precautions  Problem Solving: Able to problem solve independently  Insights: Fully aware of deficits  Initiation: Does not require cues  Sequencing: Does not require cues  Cognition Comment: Comprehension:  Mod I, Expression: Independent, Social interaction: Independent, Problem Solving: MI, Memory: MI                      Upper Extremity Function  UE Strengthing: BUE HEP (can exercises): 2# dumbbell, 1x10 reps, various planes, rest breaks prn. To increase BUE strength and endurance for improved transfers. Educated pt in exercise technique and provided cues to maintain. Issued written copy of BUE HEP (can exercises) to patient. Reviewed exercises with patient while providing demonstration with visual/verbal cues for techniques. Pt able to read through handout and perform exercises with ~75% accuracy. Wife present during session and reports that she will perform exercises with pt at home.       LUE AROM (degrees)  LUE AROM : WFL  Left Hand AROM (degrees)  Left Hand AROM: WFL  RUE AROM (degrees)  RUE AROM : WFL  Right Hand AROM (degrees)  Right Hand AROM: WFL                 Plan  Plan  Times per week: 5-7x  Plan weeks: 1.5 weeks  Current Treatment Recommendations: Strengthening, Functional Mobility Training, Patient/Caregiver Education & Training, Home Management Training, Cognitive/Perceptual Training, Pain Management, Balance Training, Neuromuscular Re-education, Self-Care / ADL, Safety Education & Training, Endurance Training  Plan Comment: Continue per OT POC    Goals  Patient Goals   Patient goals : \"I want to get back to independence\"    Therapy Time   Individual Concurrent Group Co-treatment   Time In 930         Time Out 1030         Minutes 60         ADL trainin minutes  Therapeutic activities: 25 minutes    Electronically signed by TAYLOR Lau on 7/10/2020 at [address]

## 2020-07-11 LAB
ANISOCYTOSIS: ABNORMAL
ATYPICAL LYMPHOCYTE RELATIVE PERCENT: 4 %
BANDED NEUTROPHILS RELATIVE PERCENT: 1 %
BASOPHILS ABSOLUTE: 0.1 K/UL (ref 0–0.2)
BASOPHILS RELATIVE PERCENT: 2 %
BILIRUBIN URINE: NEGATIVE
BLOOD, URINE: NEGATIVE
CLARITY: CLEAR
COLOR: YELLOW
EOSINOPHILS ABSOLUTE: 0.1 K/UL (ref 0–0.7)
EOSINOPHILS RELATIVE PERCENT: 3 %
GLUCOSE URINE: NEGATIVE MG/DL
HCT VFR BLD CALC: 25.9 % (ref 42–52)
HEMOGLOBIN: 8.6 G/DL (ref 14–18)
KETONES, URINE: 15 MG/DL
LEUKOCYTE ESTERASE, URINE: NEGATIVE
LYMPHOCYTES ABSOLUTE: 1 K/UL (ref 1–4.8)
LYMPHOCYTES RELATIVE PERCENT: 21 %
MACROCYTES: ABNORMAL
MCH RBC QN AUTO: 34.7 PG (ref 27–31.3)
MCHC RBC AUTO-ENTMCNC: 33.3 % (ref 33–37)
MCV RBC AUTO: 104.1 FL (ref 80–100)
MICROCYTES: ABNORMAL
MONOCYTES ABSOLUTE: 0.2 K/UL (ref 0.2–0.8)
MONOCYTES RELATIVE PERCENT: 4.6 %
MYELOCYTE PERCENT: 1 %
NEUTROPHILS ABSOLUTE: 2.6 K/UL (ref 1.4–6.5)
NEUTROPHILS RELATIVE PERCENT: 64 %
NITRITE, URINE: NEGATIVE
NUCLEATED RED BLOOD CELLS: 1 /100 WBC
PDW BLD-RTO: 19.4 % (ref 11.5–14.5)
PH UA: 5 (ref 5–9)
PLATELET # BLD: 218 K/UL (ref 130–400)
PLATELET SLIDE REVIEW: ADEQUATE
PROTEIN UA: NEGATIVE MG/DL
RBC # BLD: 2.49 M/UL (ref 4.7–6.1)
SPECIFIC GRAVITY UA: 1.02 (ref 1–1.03)
URINE REFLEX TO CULTURE: ABNORMAL
UROBILINOGEN, URINE: 0.2 E.U./DL
WBC # BLD: 3.9 K/UL (ref 4.8–10.8)

## 2020-07-11 PROCEDURE — 1180000000 HC REHAB R&B

## 2020-07-11 PROCEDURE — 6370000000 HC RX 637 (ALT 250 FOR IP): Performed by: INTERNAL MEDICINE

## 2020-07-11 PROCEDURE — 99232 SBSQ HOSP IP/OBS MODERATE 35: CPT | Performed by: PHYSICAL MEDICINE & REHABILITATION

## 2020-07-11 PROCEDURE — 51798 US URINE CAPACITY MEASURE: CPT

## 2020-07-11 PROCEDURE — 97112 NEUROMUSCULAR REEDUCATION: CPT

## 2020-07-11 PROCEDURE — 36415 COLL VENOUS BLD VENIPUNCTURE: CPT

## 2020-07-11 PROCEDURE — 6370000000 HC RX 637 (ALT 250 FOR IP): Performed by: PHYSICAL MEDICINE & REHABILITATION

## 2020-07-11 PROCEDURE — 81003 URINALYSIS AUTO W/O SCOPE: CPT

## 2020-07-11 PROCEDURE — 97535 SELF CARE MNGMENT TRAINING: CPT

## 2020-07-11 PROCEDURE — 97116 GAIT TRAINING THERAPY: CPT

## 2020-07-11 PROCEDURE — 85025 COMPLETE CBC W/AUTO DIFF WBC: CPT

## 2020-07-11 PROCEDURE — 97530 THERAPEUTIC ACTIVITIES: CPT

## 2020-07-11 RX ADMIN — DROXIDOPA 100 MG: 100 CAPSULE ORAL at 14:24

## 2020-07-11 RX ADMIN — LATANOPROST 1 DROP: 50 SOLUTION OPHTHALMIC at 21:09

## 2020-07-11 RX ADMIN — CARBIDOPA AND LEVODOPA 1 TABLET: 25; 100 TABLET ORAL at 14:24

## 2020-07-11 RX ADMIN — NYSTATIN 500000 UNITS: 100000 SUSPENSION ORAL at 09:19

## 2020-07-11 RX ADMIN — FERROUS SULFATE TAB 325 MG (65 MG ELEMENTAL FE) 325 MG: 325 (65 FE) TAB at 09:18

## 2020-07-11 RX ADMIN — DROXIDOPA 100 MG: 100 CAPSULE ORAL at 09:19

## 2020-07-11 RX ADMIN — MENTHOL AND METHYL SALICYLATE: 7.6; 29 OINTMENT TOPICAL at 21:04

## 2020-07-11 RX ADMIN — MENTHOL AND METHYL SALICYLATE: 7.6; 29 OINTMENT TOPICAL at 14:26

## 2020-07-11 RX ADMIN — HYDROCORTISONE: 1 CREAM TOPICAL at 09:26

## 2020-07-11 RX ADMIN — MENTHOL AND METHYL SALICYLATE: 7.6; 29 OINTMENT TOPICAL at 09:18

## 2020-07-11 RX ADMIN — CARBIDOPA AND LEVODOPA 1 TABLET: 25; 100 TABLET ORAL at 09:18

## 2020-07-11 RX ADMIN — FOLIC ACID 1000 MCG: 1 TABLET ORAL at 09:18

## 2020-07-11 RX ADMIN — CARBIDOPA AND LEVODOPA 1 TABLET: 25; 100 TABLET ORAL at 21:02

## 2020-07-11 RX ADMIN — VITAMIN D, TAB 1000IU (100/BT) 2000 UNITS: 25 TAB at 17:07

## 2020-07-11 RX ADMIN — Medication 100 MG: at 17:07

## 2020-07-11 RX ADMIN — LACTOBACILLUS TAB 2 TABLET: TAB at 21:02

## 2020-07-11 RX ADMIN — LACTOBACILLUS TAB 2 TABLET: TAB at 09:18

## 2020-07-11 RX ADMIN — LACTOBACILLUS TAB 2 TABLET: TAB at 14:24

## 2020-07-11 RX ADMIN — FINASTERIDE 5 MG: 5 TABLET, FILM COATED ORAL at 09:18

## 2020-07-11 RX ADMIN — CYANOCOBALAMIN TAB 500 MCG 1000 MCG: 500 TAB at 09:19

## 2020-07-11 RX ADMIN — FERROUS SULFATE TAB 325 MG (65 MG ELEMENTAL FE) 325 MG: 325 (65 FE) TAB at 17:07

## 2020-07-11 RX ADMIN — NYSTATIN 500000 UNITS: 100000 SUSPENSION ORAL at 14:24

## 2020-07-11 RX ADMIN — Medication 100 MG: at 06:13

## 2020-07-11 RX ADMIN — DROXIDOPA 100 MG: 100 CAPSULE ORAL at 21:03

## 2020-07-11 ASSESSMENT — PAIN DESCRIPTION - FREQUENCY
FREQUENCY: CONTINUOUS
FREQUENCY: CONTINUOUS

## 2020-07-11 ASSESSMENT — PAIN DESCRIPTION - LOCATION
LOCATION: BACK
LOCATION: BACK

## 2020-07-11 ASSESSMENT — PAIN SCALES - GENERAL
PAINLEVEL_OUTOF10: 2
PAINLEVEL_OUTOF10: 5
PAINLEVEL_OUTOF10: 5
PAINLEVEL_OUTOF10: 0

## 2020-07-11 ASSESSMENT — PAIN DESCRIPTION - ORIENTATION
ORIENTATION: RIGHT;MID
ORIENTATION: RIGHT;LOWER

## 2020-07-11 ASSESSMENT — PAIN DESCRIPTION - PAIN TYPE
TYPE: ACUTE PAIN
TYPE: ACUTE PAIN

## 2020-07-11 ASSESSMENT — PAIN DESCRIPTION - ONSET: ONSET: ON-GOING

## 2020-07-11 NOTE — PROGRESS NOTES
Continuous  Pain Onset: On-going  Pre Treatment Pain Screening  Pain at present: 5  Scale Used: Numeric Score  Intervention List: Patient able to continue with treatment;Patient declined any intervention  Vital Signs  Patient Currently in Pain: Yes     Objective       Instrumental ADL's  Instrumental ADLs: Yes  Money Management  Money Management Level of Assistance: Independent  Money Management: Counting Money: Pt provided with 5 different numerical money values and instructed to give therapist exact amount in dollars and change. Pt correctly counted 5 out of 5 trials with no difficulty. Pt able to return money to money drawer with no difficulty and no errors. Coordination  Theraputty: Purple putty (hard resistance), squeezing, rolling, pinching, folding, inserting and removing beads. Pt successfully inserted/removed 15 of 15 beads with Min difficulty and increased time. To improve hand strength/fine motor coordination for mgmt of clothing fasteners/ADL containers in a timely manner.      Plan  Plan  Times per week: 5-7x  Plan weeks: 1.5 weeks  Current Treatment Recommendations: Strengthening, Functional Mobility Training, Patient/Caregiver Education & Training, Home Management Training, Cognitive/Perceptual Training, Pain Management, Balance Training, Neuromuscular Re-education, Self-Care / ADL, Safety Education & Training, Endurance Training  Plan Comment: Continue per OT POC    Goals  Patient Goals   Patient goals : \"I want to get back to independence\"      Therapy Time   Individual Concurrent Group Co-treatment   Time In 1530         Time Out 1600         Minutes 30         ADL training: 15 minutes  Therapeutic activities: 15 minutes    Electronically signed by TAYLOR Marte on 7/11/2020 at 4:02 PM  TAYLOR Marte

## 2020-07-11 NOTE — PROGRESS NOTES
Assessment completed. A&O x4. C/o pain in lower back and right flank but denies pain meds. Applied Lidoderm patches and Icy Hot to painful areas. Noted a large bruise from lower back to right flank and groin from fall. Avasys in room for safety due to pt being forgetful at times. Call light in reach.  Electronically signed by Lucia Elliott LPN on 6/77/4921 at 1:51 PM

## 2020-07-11 NOTE — PROGRESS NOTES
Subjective: The patient complains of severe  acute on chronic progressive weakness secondary to anemia partially relieved by blood transfusions, rest, PT, OT and exacerbated by current decline in H&H. His impulsivity seems to be improving as his UTI is treated are still considering the possible use of Ditropan after we check his postvoid residual again I will please have nursing reported to me and leave it on my phone line. I discussed Proscar with urology I see that they did not started I will go ahead and start it for them. He was on Cipro and I will recheck urine. He had a recent CBC which was unremarkable except for low white count and low H&H 3.9 and 8.6 25.9 respectively. I am concerned that he is getting a lot of bruising from his copious blood draws. I will decrease his to white blood cell count and CBC as needed. ROS x10: The patient also complains of severely impaired mobility and activities of daily living. Otherwise no new problems with vision, hearing, nose, mouth, throat, dermal, cardiovascular, GI, , pulmonary, musculoskeletal, psychiatric or neurological. See Rehab H&P on Rehab chart dated . Vital signs:  /87   Pulse 69   Temp 98 °F (36.7 °C) (Oral)   Resp 17   Ht 6' (1.829 m)   Wt 168 lb (76.2 kg)   SpO2 97%   BMI 22.78 kg/m²   I/O:   PO/Intake:  fair PO intake, no problems observed or reported. Bowel/Bladder:  continent, urinary urgency recently on Cipro acute Enterococcus faecalis UTI-history of retention  General:  Patient is well developed, adequately nourished, non-obese and     well kempt. HEENT:    PERRLA, hearing intact to loud voice, external inspection of ear     and nose benign. Inspection of lips, tongue and gums benign  Musculoskeletal: No significant change in strength or tone. All joints stable. Inspection and palpation of digits and nails show no clubbing,       cyanosis or inflammatory conditions.    Neuro/Psychiatric: Affect: flat but pleasant. Alert and oriented to person, place and     situation. No significant change in deep tendon reflexes or     Sensation-poor insight into his deficits  Lungs:  Diminished, CTA-B. Respiration effort is normal at rest.     Heart:   S1 = S2, RRR. No loud murmurs. Abdomen:  Soft, non-tender, no enlargement of liver or spleen. Extremities:  No significant lower extremity edema or tenderness. Skin:   Intact to general survey, no visualized or palpated problems-chest bruising. Rehabilitation:  Physical therapy: FIMS:  Bed Mobility: Scooting: Supervision    Transfers: Sit to Stand: Stand by assistance  Stand to sit: Stand by assistance  Bed to Chair: Stand by assistance, Contact guard assistance, Ambulation 1  Surface: level tile  Device: Single point cane  Assistance: Stand by assistance  Quality of Gait: cues for posture, occational stagger and deviated path as patient self corrects mild lob  Gait Deviations: Deviated path, Decreased step length, Decreased step height, Decreased arm swing, Staggers  Distance: 80'  Comments: VC in hallway to make room for oncoming and passing traffic., Stairs  # Steps : 8  Stairs Height: 6\"  Rails: Bilateral  Curbs: 4\"(2\" then 4\" curbs)  Device: [employer]  Assistance: Stand by assistance  Comment: Pt able to ascend and descend with continous cuing for technique. Left foot for ascent and descent, SPC in right. FIMS:  ,  , Assessment: Patient challenged by neuro activities, assist varied from sba - mod with lateral lob. Safe approach to chair, delayed balance reactions,however patient with good safety awarenss. Occupational therapy: FIMS:   ,  , Assessment: Pt. demonstrates G strength but continues to be limited by fatigue. Continues to benefit from OT to maximize independence with ADL tasks.      Speech therapy: FIMS:        Lab/X-ray studies reviewed, analyzed and discussed with patient and staff:   Recent Results (from the past 24 hour(s)) CBC Auto Differential    Collection Time: 07/11/20  6:03 AM   Result Value Ref Range    WBC 3.9 (L) 4.8 - 10.8 K/uL    RBC 2.49 (L) 4.70 - 6.10 M/uL    Hemoglobin 8.6 (L) 14.0 - 18.0 g/dL    Hematocrit 25.9 (L) 42.0 - 52.0 %    .1 (H) 80.0 - 100.0 fL    MCH 34.7 (H) 27.0 - 31.3 pg    MCHC 33.3 33.0 - 37.0 %    RDW 19.4 (H) 11.5 - 14.5 %    Platelets 587 392 - 864 K/uL    PLATELET SLIDE REVIEW Adequate     Neutrophils % 64.0 %    Lymphocytes % 21.0 %    Monocytes % 4.6 %    Eosinophils % 3.0 %    Basophils % 2.0 %    Neutrophils Absolute 2.6 1.4 - 6.5 K/uL    Lymphocytes Absolute 1.0 1.0 - 4.8 K/uL    Monocytes Absolute 0.2 0.2 - 0.8 K/uL    Eosinophils Absolute 0.1 0.0 - 0.7 K/uL    Basophils Absolute 0.1 0.0 - 0.2 K/uL    Bands Relative 1 %    Atypical Lymphocytes Relative 4 %    Myelocyte Percent 1 %    nRBC 1 /100 WBC    Anisocytosis 1+     Macrocytes 1+     Microcytes 1+        Xr Lumbar Spine   6/29/2020    Nonspecific bowel gas pattern. Vascular calcifications noted abdominal aorta. Normal sagittal alignment lumbar spine. Possible fracture versus osteophyte anterior superior corner of L2. Moderate facet osteoarthropathy L3-S1. Moderate degenerative disc disease L5-S1. No Pars interarticularis fracture defect. Impression:  1. Possible chip fracture versus osteophyte anterior superior corner of L2, further evaluation with MRI of the lumbar spine recommended. 2. Moderate facet osteoarthropathy L3-S1 with moderate degenerative disc disease L5-S1. 3. Vascular calcifications abdominal aorta. Ct Head   6/29/2020    Vascular calcifications within the bilateral internal carotid artery cavernous segments and the vertebral arteries. . Moderate to moderately severe cerebral volume loss/atrophy. No subfalcine, transtentorial or tonsillar herniation or shift. No intraparenchymal hemorrhage. No extra-axial fluid collection or hematoma. Hypodensities bilateral centrum semiovale and periventricular regions. Empty sella. No Chiari malformation. Mucosal disease left maxillary sinus. No acute fracture or lytic or blastic bony lesion. Postoperative changes left orbit/optic globe. Airways and paranasal sinuses grossly unremarkable. 1. No CT evidence of acute intracranial abnormality, as questioned. 2. Moderate to moderately severe cerebral volume loss/atrophy with white matter changes consistent with sequelae small vessel ischemic disease. . 3. Vascular calcifications within the bilateral internal carotid artery cavernous segments and the vertebral arteries. . 4. Empty sella. Ct Chest   6/29/2020   1. NO ACUTE TRAUMATIC INJURY TO THE CHEST AS DESCRIBED IN BODY OF REPORT. 2. MEDIASTINAL STRUCTURES AND THORACIC AORTA INTACT. NO MEDIASTINAL HEMATOMA. 3. LUNGS APPEAR EXPANDED AND CLEAR WITHOUT A PULMONARY CONTUSION OR PNEUMOTHORAX. 4. DEGENERATIVE BONE SPURRING IN THE THORACIC SPINE AND NO THORACIC SPINE FRACTURE. 5. RIBS APPEAR INTACT AND NO CT EVIDENCE OF A DISPLACED RIB FRACTURE. Ct Abdomen Pelvis  : 6/29/2020   1. SOFT TISSUE CONTUSION INVOLVING THE RIGHT POSTEROLATERAL ABDOMINAL WALL. 2. 10 X 5 X 10 CM RIGHT POSTERIOR ABDOMINAL WALL HEMATOMA WITH L2 CHIP FRACTURE. 3. NO INTRAPERITONEAL OR RETROPERITONEAL HEMORRHAGE OR HEMATOMA. 4. NO ABDOMINAL ORGAN TRAUMATIC INJURY OR ABDOMINAL ORGAN LACERATION. 5. DEGENERATIVE BONE CHANGES IN THE LUMBAR SPINE AND NO LUMBAR SPINE FRACTURE. 6. NO PELVIC FRACTURE AND ADDITIONAL INCIDENTAL FINDINGS IN BODY OF REPORT. Xr Chest   6/29/2020    Lungs and pleura:  No consolidation. No pleural effusion. No pneumothorax. Normal pulmonary vascular pattern. Cardiomediastinal silhouette:  Stable cardiomediastinal silhouette. Other:  No acute osseous findings. Remote fracture deformity left proximal humerus. No acute radiographic abnormality. Us Carotid Artery   6/29/2020   . 1.  No areas of estimated luminal stenosis greater than 0-49 percent within the bilateral internal carotid arteries. . 2. Scattered areas of calcified and noncalcified plaque bilateral internal carotid arteries and bilateral carotid artery bulb/bifurcations. GOSINK CRITERIA Diameter          PSV t            EDV t          PSV          EDV          Stenosis Site       %              cm/sec          cm/sec      ICA/CCA    ICA/CCA 0-49                 <124              <40             <2:1           ---                 --- 50-69              125-225                  >2:1           ---                 --- 70-89               225-325          >100          >4:1           >5:1              --- 90%+                >325              >100          >4:1           >9:1           Damped resistive CCA >95%                 Previous extensive, complex labs, notes and diagnostics reviewed and analyzed. ALLERGIES:    Allergies as of 07/01/2020 - Review Complete 07/01/2020   Allergen Reaction Noted    Cat hair extract Itching and Swelling 11/06/2018      (please also verify by checking MAR)     I have encouraged patient to attend their adjustment to rehabilitation support group with rec therapy and rehabilitation psychology in order to improve their adjustments, well-being, and help their spiritual and cognitive recovery. Complex Physical Medicine & Rehab Issues Assess & Plan:   1. Severe abnormality of gait and mobility and impaired self-care and ADL's secondary to progressive severe Parkinson's disease. Functional and medical status reassessed regarding patients ability to participate in therapies and patient found to be able to participate in acute intensive comprehensive inpatient rehabilitation program including PT/OT to improve balance, ambulation, ADLs, and to improve the P/AROM. Therapeutic modifications regarding activities in therapies, place, amount of time per day and intensity of therapy made daily.   In bed therapies or bedside therapies prn.   2. Bowel Parkinson's related constipation and Bladder dysfunction neurogenic bladder:  frequent toileting, ambulate to bathroom with assistance, check post void residuals. Check for C.difficile x1 if >2 loose stools in 24 hours, continue bowel & bladder program.  Monitor bowel and bladder function. Lactinex 2 PO every AC. MOM prn, Brown Bomb prn, Glycerin suppository prn, enema prn. Recurrent UTIs due to urinary retention and intolerance to Flomax at times because of low BP. Trial Proscar avoid Flomax due to low blood pressure check PVRs once UTI is treated and consider Ditropan first bladder spasms recheck UA status post Cipro treatment for Enterococcus faecalis  3. Severe mid and low back parkinsonian stiffness related pain as well as generalized OA pain: reassess pain every shift and prior to and after each therapy session, give prn Tylenol and low-dose Norco avoiding any NSAIDs, modalities prn in therapy, Lidoderm, K-pad prn. Titrate to lowest effective dose of Norco  4. Skin healing bilateral upper extremity bruising and breakdown risk:  continue pressure relief program.  Daily skin exams and reports from nursing. 5. Severe fatigue due to nutritional and hydration deficiency: Add vitamin B12 vitamin D and CoQ10 continue to monitor I&Os, calorie counts prn, dietary consult prn.  6. Acute episodic insomnia with situational adjustment disorder:  prn Ambien, monitor for day time sedation. 7. Falls risk elevated:  patient to use call light to get nursing assistance to get up, bed and chair alarm. 8. Elevated DVT risk: progressive activities in PT, continue prophylaxis BRIAN hose, elevation and avoid all blood thinners due to recent anemia need for transfusion and myelodysplasia. 9. Complex discharge planning: We will continue to focus on balance transitioning to lowest effective dose of any opiate medication independence and pain management as patient progresses toward discharge 7/12/2020 home with his wife and home health care.   He is status post weekly team meeting every Monday to assess progress towards goals, discuss and address social, psychological and medical comorbidities and to address difficulties they may be having progressing in therapy. Patient and family education is in progress. The patient is to follow-up with their family physician after discharge. Complex Active General Medical Issues that complicate care Assess & Plan:    1. Hypertension with   Orthostasis, Aortic valve stenosis, severe, LVH (left ventricular hypertrophy), Diastolic dysfunction-vital signs every shift dose and titrate cardiac medications to include ProAmatine consult hospitalist for backup medical-recheck CBC monitor closely for bleeding  2. Macular degeneration with   Blindness  right eye-usual cues to the right  3. BPH (benign prostatic hyperplasia)-check post void residuals and check recheck UA  4.   PD (Parkinson's disease) -monitor for orthostasis treat comorbidities including pain constipation rigid rigidity and cognitive decline consult neurology and titrate dopaminergic medications  5. MDS (myelodysplastic syndrome), low grade -add B12 recheck CBC add iron with food add Folvite transfuse as needed transfusion scheduled for 7/3/2020  6. Closed fracture of body of lumbar vertebra with active and chronic pain-add Lidoderm add high-dose vitamin D  7. 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 Severe primary orthostatic hypotension-check Ortho BPs titrate cardiac medications add abdominal binder and teds. Consider blood pressure adjustments and resuming Flomax if possible  9. Acute UTI-trial Cipro  -check urinary culture and Sensitivity Place, Mckeon catheter if retention greater than 300 resume Flomax when possible due to blood pressure issues.   Toilet frequently        Angela Spaulding D.O., PM&R     Attending    [address]

## 2020-07-11 NOTE — PROGRESS NOTES
Physical Therapy Rehab Treatment Note  Facility/Department: [facility]  Room: [place] [id number]       NAME: Marbella Dugan  : 1940 (86 y.o.)  MRN: [id number]  CODE STATUS: Full Code    Date of Service: 2020  Chart Reviewed: Yes  Family / Caregiver Present: No    Restrictions:  Restrictions/Precautions: Fall Risk       SUBJECTIVE: Subjective: Patient reprots he needs to use restroom and brush teeth.   Pain Screening  Patient Currently in Pain: Yes  Pre Treatment Pain Screening  Pain at present: 5  Scale Used: Numeric Score  Intervention List: Patient able to continue with treatment  Comments / Details: patient  states right side back and mid is sore and swollen, declined rn or meds    Post Treatment Pain Screening:  Pain Assessment  Pain Assessment: 0-10  Pain Level: 5  Pain Type: Acute pain  Pain Location: Back  Pain Orientation: Right;Mid  Pain Frequency: Continuous    OBJECTIVE:           Neuromuscular Education  NDT Treatment: Gait ;Standing  Neuromuscular Comments: standing with 1ue support step fwd/lat/retro x15 each assist cga-mod varied    Bed mobility  Rolling to Left: Supervision  Rolling to Right: Unable to assess  Supine to Sit: Supervision  Sit to Supine: Unable to assess  Scooting: Supervision  Comment: cues for technique, good carryover    Transfers  Sit to Stand: Stand by assistance  Stand to sit: Stand by assistance  Bed to Chair: Stand by assistance;Contact guard assistance  Car Transfer: Unable to assess(time limits, in room session)  Comment: toilet transfer, patient stood to urinate, able to manage pants with sba    Ambulation  Ambulation?: Yes  Ambulation 1  Surface: level tile  Device: Single point cane  Assistance: Stand by assistance  Quality of Gait: cues for posture, occational stagger and deviated path as patient self corrects mild lob  Gait Deviations: Deviated path;Decreased step length;Decreased step height;Decreased arm swing;Staggers    Stairs/Curb  Stairs?: No(in room session, time limited)        Exercises  Hip Extension/Leg Presses: standing x20     ASSESSMENT/COMMENTS:  Body structures, Functions, Activity limitations: Decreased functional mobility ; Decreased strength;Decreased safe awareness;Decreased balance  Assessment: Patient challenged by neuro activities, assist varied from sba - mod with lateral lob. Safe approach to chair, delayed balance reactions,however patient with good safety awarenss.     PLAN OF CARE/Safety: all precautions in place         Therapy Time:   Individual   Time In 0830   Time Out 0900   Minutes 30     Minutes:30      Transfer/Bed mobility trainin     Gait training:10      Neuro re education:8         Sharri Gillis PTA, 20 at 9:56 AM

## 2020-07-12 VITALS
WEIGHT: 168 LBS | TEMPERATURE: 98 F | HEART RATE: 69 BPM | HEIGHT: 72 IN | BODY MASS INDEX: 22.75 KG/M2 | RESPIRATION RATE: 18 BRPM | SYSTOLIC BLOOD PRESSURE: 156 MMHG | DIASTOLIC BLOOD PRESSURE: 85 MMHG | OXYGEN SATURATION: 99 %

## 2020-07-12 LAB
GLUCOSE BLD-MCNC: 119 MG/DL (ref 60–115)
PERFORMED ON: ABNORMAL

## 2020-07-12 PROCEDURE — 99239 HOSP IP/OBS DSCHRG MGMT >30: CPT | Performed by: PHYSICAL MEDICINE & REHABILITATION

## 2020-07-12 PROCEDURE — 6370000000 HC RX 637 (ALT 250 FOR IP): Performed by: PHYSICAL MEDICINE & REHABILITATION

## 2020-07-12 PROCEDURE — 6370000000 HC RX 637 (ALT 250 FOR IP): Performed by: INTERNAL MEDICINE

## 2020-07-12 RX ORDER — OXYBUTYNIN CHLORIDE 5 MG/1
5 TABLET, EXTENDED RELEASE ORAL NIGHTLY
Status: DISCONTINUED | OUTPATIENT
Start: 2020-07-12 | End: 2020-07-12 | Stop reason: HOSPADM

## 2020-07-12 RX ORDER — DROXIDOPA 100 MG/1
100 CAPSULE ORAL 3 TIMES DAILY
Qty: 90 CAPSULE | Refills: 0 | Status: SHIPPED | OUTPATIENT
Start: 2020-07-12 | End: 2020-07-23 | Stop reason: ALTCHOICE

## 2020-07-12 RX ORDER — OXYBUTYNIN CHLORIDE 5 MG/1
5 TABLET, EXTENDED RELEASE ORAL NIGHTLY
Qty: 30 TABLET | Refills: 3 | Status: SHIPPED | OUTPATIENT
Start: 2020-07-12 | End: 2020-08-25 | Stop reason: SDUPTHER

## 2020-07-12 RX ORDER — FINASTERIDE 5 MG/1
5 TABLET, FILM COATED ORAL DAILY
Qty: 30 TABLET | Refills: 3 | Status: SHIPPED | OUTPATIENT
Start: 2020-07-13 | End: 2021-12-13 | Stop reason: SDUPTHER

## 2020-07-12 RX ORDER — DIAPER,BRIEF,INFANT-TODD,DISP
EACH MISCELLANEOUS
Qty: 1 TUBE | Refills: 1 | Status: SHIPPED | OUTPATIENT
Start: 2020-07-12 | End: 2020-07-19

## 2020-07-12 RX ORDER — L. ACIDOPHILUS/L.BULGARICUS 1MM CELL
2 TABLET ORAL 3 TIMES DAILY
Qty: 120 TABLET | Refills: 1 | Status: SHIPPED | OUTPATIENT
Start: 2020-07-12 | End: 2022-11-01

## 2020-07-12 RX ADMIN — DROXIDOPA 100 MG: 100 CAPSULE ORAL at 08:11

## 2020-07-12 RX ADMIN — CARBIDOPA AND LEVODOPA 1 TABLET: 25; 100 TABLET ORAL at 13:00

## 2020-07-12 RX ADMIN — CYANOCOBALAMIN TAB 500 MCG 1000 MCG: 500 TAB at 08:09

## 2020-07-12 RX ADMIN — FOLIC ACID 1000 MCG: 1 TABLET ORAL at 08:09

## 2020-07-12 RX ADMIN — MENTHOL AND METHYL SALICYLATE: 7.6; 29 OINTMENT TOPICAL at 08:11

## 2020-07-12 RX ADMIN — MENTHOL AND METHYL SALICYLATE: 7.6; 29 OINTMENT TOPICAL at 13:05

## 2020-07-12 RX ADMIN — CARBIDOPA AND LEVODOPA 1 TABLET: 25; 100 TABLET ORAL at 08:09

## 2020-07-12 RX ADMIN — DROXIDOPA 100 MG: 100 CAPSULE ORAL at 13:00

## 2020-07-12 RX ADMIN — Medication 100 MG: at 06:01

## 2020-07-12 RX ADMIN — FINASTERIDE 5 MG: 5 TABLET, FILM COATED ORAL at 08:08

## 2020-07-12 RX ADMIN — LACTOBACILLUS TAB 2 TABLET: TAB at 13:00

## 2020-07-12 RX ADMIN — LACTOBACILLUS TAB 2 TABLET: TAB at 08:08

## 2020-07-12 RX ADMIN — FERROUS SULFATE TAB 325 MG (65 MG ELEMENTAL FE) 325 MG: 325 (65 FE) TAB at 08:09

## 2020-07-12 NOTE — PROGRESS NOTES
[facility]CCUPATIONAL THERAPY DISCHARGE SUMMARY- REHAB     Date: 2020  Patient Name: Michell Reeder        MRN: [id number]  Account: [de-identified]   : 1940  ([de-identified] y.o.)  Room: [place] [address]    Diagnosis:  Impaired mobility and ADLs 2° to severe parkinson's disease with blunt chest trauma    Past Medical History:   Diagnosis Date    Actinic keratoses     Aortic valve stenosis, severe 2013    BPH (benign prostatic hyperplasia)     Cancer ([facility] [address] 2018    skin cancer on chest removed    Colon polyp     Diastolic dysfunction     Stage 2 by echo    ED (erectile dysfunction) 2011    Glaucoma     Dr. Virginia Mason Hemochromatosis     History of echocardiogram 2015    mild AS, mild AR, mild TR, mild MR    [place]    Hypertension     Iron deficiency anemia 2014    LVH (left ventricular hypertrophy) 2013    Macular degeneration     bilateral; dry on left and wet on right-Dr. Vasiliy Lu on back 2015    Osteoarthritis cervical spine     Parkinson disease ([facility]     Dr. Jamey New     Past Surgical History:   Procedure Laterality Date    BACK SURGERY      CARDIAC CATHETERIZATION  2017    [facility] MD    CATARACT REMOVAL      COLONOSCOPY  2009    single polyp right colon    EYE SURGERY Bilateral     OTHER SURGICAL HISTORY  06/08/15    EXC SUBCU MASS BACK    TONSILLECTOMY         Precautions:   Restrictions/Precautions: Fall Risk     Social/Functional History:  Social/Functional History  Lives With: Spouse  Type of Home: House  Home Layout: Two level, Bed/Bath upstairs, 1/2 bath on main level(handrail both sides for steps to second floor )  Home Access: Level entry  Bathroom Shower/Tub: Walk-in shower  Bathroom Toilet: Standard  Bathroom Equipment: Grab bars in shower, Hand-held shower(plans to buy shower chair)  Home Equipment: Rolling walker(rollator )  Receives Help From: Family  ADL Assistance: Independent  Homemaking Assistance: Independent  Homemaking Responsibilities: Yes  Ambulation Assistance: Independent(cane or rollator prn)  Transfer Assistance: Independent(occassional help with bed mobility on \"bad days\")  Active : No  Education: 2 years of college  Occupation: Retired  Type of occupation: [employer]  Leisure & Hobbies: antiques, estate sales, sports fan  Additional Comments: Pt. reports he enjoys walking. Has 3 children who do not live locally    Current Functional Status:  ADL  Feeding: Independent  Grooming: Independent(Pt sat at sink to perform oral care and hair care)  UE Bathing: Modified independent (Use of detachable shower head)  LE Bathing: Modified independent (Pt utilized grab bars)  UE Dressing: Independent(To doff/don t-shirt and pull over sweater)  LE Dressing: Modified independent (To doff/don underwear, pants, and socks)  Toileting: Independent  Additional Comments: Pt. independently stood at toilet to attempt urination. Pt. was unable to urinate but stood to manage pants and perform hygiene as though toileting, as above. Toilet Transfers  Toilet - Technique: Ambulating  Equipment Used: Grab bars  Toilet Transfer: Unable to assess  Toilet Transfers Comments: Stood to toilet     Shower Transfers  Shower - Transfer From: [employer] - Transfer Type: To and From  Shower - Transfer To: Shower seat with back  Shower - Technique: Ambulating  Shower Transfers: Modified independence  Shower Transfers Comments: Pt utilized grab bars    Orientation Status:  Orientation  Overall Orientation Status: Within Functional Limits    Cognition Status:  Cognition  Overall Cognitive Status: WFL  Arousal/Alertness: Appropriate responses to stimuli  Following Commands:  Follows all commands without difficulty  Attention Span: Appears intact  Memory: Appears intact  Safety Judgement: Good awareness of safety precautions  Problem Solving: Able to problem solve independently  Insights: Fully aware of deficits  Initiation: Does not require cues  Sequencing: Does not require cues  Cognition Comment: Comprehension: Mod I, Expression: Independent, Social interaction: Independent, Problem Solving: MI, Memory: MI    Perception Status:  Perception  Overall Perceptual Status: WFL    Sensation Status:  Sensation  Overall Sensation Status: WFL    Vision and Hearing Status:  Vision  Vision: Impaired  Vision Exceptions: Wears glasses for reading  Hearing  Hearing: Within functional limits     UE Function Status:    ROM:   LUE AROM (degrees)  LUE AROM : WFL  Left Hand AROM (degrees)  Left Hand AROM: WFL  RUE AROM (degrees)  RUE AROM : WFL  Right Hand AROM (degrees)  Right Hand AROM: WFL    Strength:  LUE Strength  Gross LUE Strength: WFL  L Shoulder Flex: 3+/5  L Shoulder Ext: 3+/5  L Shoulder ABduction: 3+/5  L Shoulder ADduction: 3+/5  L Shoulder Horiz ABduction: 3+/5  L Shoulder Horiz ADduction: 3+/5  L Elbow Flex: 3+/5  L Elbow Ext: 3+/5  L Wrist Flex: 3+/5  L Wrist Ext: 3+/5  L Hand General: 4-/5  LUE Strength Comment: Strength limited by fatigue  RUE Strength  Gross RUE Strength: WFL  R Shoulder Flex: 3+/5  R Shoulder Ext: 3+/5  R Shoulder ABduction: 3+/5  R Shoulder ADduction: 3+/5  R Shoulder Horiz ABduction: 3+/5  R Shoulder Horiz ADduction: 3+/5  R Elbow Flex: 3+/5  R Elbow Ext: 3+/5  R Wrist Flex: 3+/5  R Wrist Ext: 3+/5  R Hand General: 4-/5    Coordination, Tone, Quality of Movement: Tone RUE  RUE Tone: Normotonic  Tone LUE  LUE Tone: Normotonic  Coordination  Movements Are Fluid And Coordinated: No  Coordination and Movement description: Fine motor impairments, Decreased accuracy, Right UE, Left UE, Tremors    D/C Recommendations:    Equipment Recommendations:       OT Follow Up:  OT D/C RECOMMENDATIONS  REQUIRES OT FOLLOW UP: Yes HHOT    Home Exercise Program Provided: Yes  If yes, type of HEP: UE strengthening     Electronically signed by:     OSMAN Humphrey, OTR/L  7/12/2020, 7:59 AM

## 2020-07-12 NOTE — PROGRESS NOTES
Hospitalist Progress Note  7/12/2020 11:17 AM    Assessment and Plan:   1. Gait instability and Decreased Functional Status secondary to Parkinson's myelodysplastic syndrome, primary autonomic dysfunction with orthostatic hypotension: Patient completed PT and OT therapy. Will continue therapy at home. Patient to follow up with outpatient neurology. Continue droxidopa. 2. BPH: Continue Ditropan and finasteride  Monitor for signs of urinary retention  3. Discharge planning: SW on board. He is medically stable and cleared by all consultants for discharge. All medications have been reviewed and reconciled  4. High Risk Readmission Screening Tool Score Noted. Additionally, the following hospital problems were addressed:  Principal Problem:    Gait abnormality due to Severe Parkinson's Disease with Blunt Chest Trauma; rehab admission 07/01/20. 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 *      ** Total time spent reviewing medical records, evaluating patient, speaking with RN's and consultants where I was focused exclusively on this patient: 35 minutes. This time is excluding time spent performing procedures or significant events occurring earlier or later in the day requiring my attention and focus. Subjective:   Admit Date: 7/1/2020  PCP: Jeannine Brown MD    No acute events overnight. Afebrile  Telemetry reviewed.  No new complaints. Pt denies chest pain, SOB, N/V, fevers or chills. Objective:     Vitals:    07/11/20 0729 07/11/20 1415 07/11/20 1947 07/12/20 0738   BP: 138/87 (!) 150/85 (!) 155/80 (!) 156/85   Pulse: 69 63 67 69   Resp: 17  17 18   Temp: 98 °F (36.7 °C)  97 °F (36.1 °C) 98 °F (36.7 °C)   TempSrc: Oral  Oral Oral   SpO2: 97%  100% 99%   Weight:       Height:         General appearance: no distress, A + O x 3. No conversational dyspnea noted. Dentition intact. Answers questions appropriately  Lungs: CTAB no exp wheezes, No rales No retractions; No use of accessory muscles  Heart:  S1, S2 normal, RRR, no MRG appreciated  Abdomen: (+) BS, soft, non-tender; non distended no guarding or rigidity. Extremities:  no cyanosis,  no edema bilat lower exts, no calf tenderness bilaterally.  Dry skin noted       Medications:      oxybutynin  5 mg Oral Nightly    finasteride  5 mg Oral Daily    Droxidopa  100 mg Oral TID    lactobacillus acidophilus  2 tablet Oral TID    lidocaine  3 patch Transdermal Daily    analgesic ointment   Topical TID    Vitamin D  2,000 Units Oral Dinner    cyanocobalamin  1,000 mcg Intramuscular Weekly    coenzyme Q10  100 mg Oral BID AC    ferrous sulfate  325 mg Oral BID     carbidopa-levodopa  1 tablet Oral TID    folic acid  6,596 mcg Oral Daily    latanoprost  1 drop Both Eyes Nightly    vitamin B-12  1,000 mcg Oral Daily       LABS Reviewed    IMAGING Reviewed    Sade Myers CNP  [facility] Hospitalist

## 2020-07-12 NOTE — PLAN OF CARE
Problem: Falls - Risk of:  Goal: Will remain free from falls  Description: Will remain free from falls  7/12/2020 1134 by Woody Landaverde RN  Outcome: Completed  7/12/2020 1133 by Woody Landaverde RN  Outcome: Ongoing  7/12/2020 0102 by Juan Ramon Gutierrez RN  Outcome: Ongoing  Goal: Absence of physical injury  Description: Absence of physical injury  7/12/2020 1134 by Woody Landaverde RN  Outcome: Completed  7/12/2020 1133 by Woody Landaverde RN  Outcome: Ongoing  7/12/2020 0102 by Juan Ramon Gutierrez RN  Outcome: Ongoing     Problem: Skin Integrity:  Goal: Will show no infection signs and symptoms  Description: Will show no infection signs and symptoms  7/12/2020 1134 by Woody Landaverde RN  Outcome: Completed  7/12/2020 1133 by Woody Landaverde RN  Outcome: Ongoing  Goal: Absence of new skin breakdown  Description: Absence of new skin breakdown  7/12/2020 1134 by Woody Landaverde RN  Outcome: Completed  7/12/2020 1133 by Woody Landaverde RN  Outcome: Ongoing     Problem: Mobility - Impaired:  Goal: Mobility will improve  Description: Mobility will improve  7/12/2020 1134 by Woody Landaverde RN  Outcome: Completed  7/12/2020 1133 by Woody Landaverde RN  Outcome: Ongoing     Problem: IP COMMUNICATION/DYSARTHRIA  Goal: LTG - patient will improve expressive language skills to allow for communication of wants and needs in daily activities  7/12/2020 1134 by Woody Landaverde RN  Outcome: Completed  7/12/2020 1133 by Woody Landaverde RN  Outcome: Ongoing  Goal: LTG - patient will utilize compensatory intervention for intelligibility  7/12/2020 1134 by Woody Landaverde RN  Outcome: Completed  7/12/2020 1133 by Woody Landaverde RN  Outcome: Ongoing     Problem: IP SWALLOWING  Goal: LTG - patient will tolerate the least restrictive diet consistency to allow for safe consumption of daily meals  7/12/2020 1134 by Woody Landaverde RN  Outcome: Completed  7/12/2020 1133 by Shemar Hsu RN  Outcome: Ongoing     Problem: IP BALANCE  Goal: LTG - Patient will maintain balance to allow for safe/functional mobility  7/12/2020 1134 by Shemar Hsu RN  Outcome: Completed  7/12/2020 1133 by Shemar Hsu RN  Outcome: Ongoing     Problem: Pain:  Goal: Pain level will decrease  Description: Pain level will decrease  7/12/2020 1134 by Shemar Hsu RN  Outcome: Completed  7/12/2020 1133 by Shemar Hsu RN  Outcome: Ongoing  Goal: Control of acute pain  Description: Control of acute pain  7/12/2020 1134 by Shemar Hsu RN  Outcome: Completed  7/12/2020 1133 by Shemar Hsu RN  Outcome: Ongoing  Goal: Control of chronic pain  Description: Control of chronic pain  7/12/2020 1134 by Shemar Hsu RN  Outcome: Completed  7/12/2020 1133 by Shemar Hsu RN  Outcome: Ongoing

## 2020-07-12 NOTE — DISCHARGE SUMMARY
problems observed or reported. Bowel/Bladder:  continent, urinary urgency recently on Cipro acute Enterococcus faecalis UTI-history of    retention  General:  Patient is well developed, adequately nourished, non-obese and     well kempt. HEENT:    PERRLA, hearing intact to loud voice, external inspection of ear     and nose benign. Inspection of lips, tongue and gums benign  Musculoskeletal: No significant change in strength or tone. All joints stable. Inspection and palpation of digits and nails show no clubbing,       cyanosis or inflammatory conditions. Neuro/Psychiatric: Affect: flat but pleasant. Alert and oriented to person, place and     situation. No significant change in deep tendon reflexes or     Sensation-poor insight into his deficits  Lungs:  Diminished, CTA-B. Respiration effort is normal at rest.     Heart:   S1 = S2, RRR. No loud murmurs. Abdomen:  Soft, non-tender, no enlargement of liver or spleen. Extremities:  No significant lower extremity edema or tenderness. Skin:   Intact to general survey, no visualized or palpated problems-chest bruising.     Rehabilitation:  Physical therapy: FIMS:  Bed Mobility: Scooting: Supervision    Transfers: Sit to Stand: Stand by assistance  Stand to sit: Stand by assistance  Bed to Chair: Stand by assistance, Contact guard assistance, Ambulation 1  Surface: level tile  Device: Single point cane  Assistance: Stand by assistance  Quality of Gait: cues for posture, occational stagger and deviated path as patient self corrects mild lob  Gait Deviations: Deviated path, Decreased step length, Decreased step height, Decreased arm swing, Staggers  Distance: 80'  Comments: VC in hallway to make room for oncoming and passing traffic., Stairs  # Steps : 8  Stairs Height: 6\"  Rails: Bilateral  Curbs: 4\"(2\" then 4\" curbs)  Device: [employer]  Assistance: Stand by assistance  Comment: Pt able to ascend and descend with continous cuing for technique. Left foot for ascent and descent, SPC in right. FIMS:  ,  , Assessment: Patient challenged by neuro activities, assist varied from sba - mod with lateral lob. Safe approach to chair, delayed balance reactions,however patient with good safety awarenss. Occupational therapy: FIMS:   ,  , Assessment: Pt. demonstrates G strength but continues to be limited by fatigue. Continues to benefit from OT to maximize independence with ADL tasks. Speech therapy: FIMS:        Lab/X-ray studies reviewed, analyzed and discussed with patient and staff:   Recent Results (from the past 24 hour(s))   Urinalysis Reflex to Culture    Collection Time: 07/11/20 11:00 AM    Specimen: Urine, clean catch   Result Value Ref Range    Color, UA Yellow Straw/Yellow    Clarity, UA Clear Clear    Glucose, Ur Negative Negative mg/dL    Bilirubin Urine Negative Negative    Ketones, Urine 15 (A) Negative mg/dL    Specific Gravity, UA 1.024 1.005 - 1.030    Blood, Urine Negative Negative    pH, UA 5.0 5.0 - 9.0    Protein, UA Negative Negative mg/dL    Urobilinogen, Urine 0.2 <2.0 E.U./dL    Nitrite, Urine Negative Negative    Leukocyte Esterase, Urine Negative Negative    Urine Reflex to Culture Not Indicated        Xr Lumbar Spine   6/29/2020    Nonspecific bowel gas pattern. Vascular calcifications noted abdominal aorta. Normal sagittal alignment lumbar spine. Possible fracture versus osteophyte anterior superior corner of L2. Moderate facet osteoarthropathy L3-S1. Moderate degenerative disc disease L5-S1. No Pars interarticularis fracture defect. Impression:  1. Possible chip fracture versus osteophyte anterior superior corner of L2, further evaluation with MRI of the lumbar spine recommended. 2. Moderate facet osteoarthropathy L3-S1 with moderate degenerative disc disease L5-S1. 3. Vascular calcifications abdominal aorta.      Ct Head   6/29/2020    Vascular calcifications within the bilateral internal carotid artery cavernous segments and the vertebral arteries. . Moderate to moderately severe cerebral volume loss/atrophy. No subfalcine, transtentorial or tonsillar herniation or shift. No intraparenchymal hemorrhage. No extra-axial fluid collection or hematoma. Hypodensities bilateral centrum semiovale and periventricular regions. Empty sella. No Chiari malformation. Mucosal disease left maxillary sinus. No acute fracture or lytic or blastic bony lesion. Postoperative changes left orbit/optic globe. Airways and paranasal sinuses grossly unremarkable. 1. No CT evidence of acute intracranial abnormality, as questioned. 2. Moderate to moderately severe cerebral volume loss/atrophy with white matter changes consistent with sequelae small vessel ischemic disease. . 3. Vascular calcifications within the bilateral internal carotid artery cavernous segments and the vertebral arteries. . 4. Empty sella. Ct Chest   6/29/2020   1. NO ACUTE TRAUMATIC INJURY TO THE CHEST AS DESCRIBED IN BODY OF REPORT. 2. MEDIASTINAL STRUCTURES AND THORACIC AORTA INTACT. NO MEDIASTINAL HEMATOMA. 3. LUNGS APPEAR EXPANDED AND CLEAR WITHOUT A PULMONARY CONTUSION OR PNEUMOTHORAX. 4. DEGENERATIVE BONE SPURRING IN THE THORACIC SPINE AND NO THORACIC SPINE FRACTURE. 5. RIBS APPEAR INTACT AND NO CT EVIDENCE OF A DISPLACED RIB FRACTURE. Ct Abdomen Pelvis  : 6/29/2020   1. SOFT TISSUE CONTUSION INVOLVING THE RIGHT POSTEROLATERAL ABDOMINAL WALL. 2. 10 X 5 X 10 CM RIGHT POSTERIOR ABDOMINAL WALL HEMATOMA WITH L2 CHIP FRACTURE. 3. NO INTRAPERITONEAL OR RETROPERITONEAL HEMORRHAGE OR HEMATOMA. 4. NO ABDOMINAL ORGAN TRAUMATIC INJURY OR ABDOMINAL ORGAN LACERATION. 5. DEGENERATIVE BONE CHANGES IN THE LUMBAR SPINE AND NO LUMBAR SPINE FRACTURE. 6. NO PELVIC FRACTURE AND ADDITIONAL INCIDENTAL FINDINGS IN BODY OF REPORT. Xr Chest   6/29/2020    Lungs and pleura:  No consolidation. No pleural effusion. No pneumothorax. Normal pulmonary vascular pattern. Cardiomediastinal silhouette:  Stable cardiomediastinal silhouette. Other:  No acute osseous findings. Remote fracture deformity left proximal humerus. No acute radiographic abnormality. Us Carotid Artery   6/29/2020   . 1. No areas of estimated luminal stenosis greater than 0-49 percent within the bilateral internal carotid arteries. . 2. Scattered areas of calcified and noncalcified plaque bilateral internal carotid arteries and bilateral carotid artery bulb/bifurcations. GOSINK CRITERIA Diameter          PSV t            EDV t          PSV          EDV          Stenosis Site       %              cm/sec          cm/sec      ICA/CCA    ICA/CCA 0-49                 <124              <40             <2:1           ---                 --- 50-69              125-225                  >2:1           ---                 --- 70-89               225-325          >100          >4:1           >5:1              --- 90%+                >325              >100          >4:1           >9:1           Damped resistive CCA >95%                 Previous extensive, complex labs, notes and diagnostics reviewed and analyzed. ALLERGIES:    Allergies as of 07/01/2020 - Review Complete 07/01/2020   Allergen Reaction Noted    Cat hair extract Itching and Swelling 11/06/2018      (please also verify by checking MAR)       I reviewed her [facility] prescription monitoring service data sheets in hopes of eliminating polypharmacy and weaning to the lowest effective dose of pain medications and eliminating the concomitant use of benzodiazepines. I see no medications of concern. I see no habits of combining sedatives and narcotics. Complex Physical Medicine & Rehab Issues Assess & Plan:   1. Severe abnormality of gait and mobility and impaired self-care and ADL's secondary to progressive severe Parkinson's disease.   Functional and medical status have improved status post acute rehab at [facility] therefore patient is scheduled for discharge today. 2. Bowel Parkinson's related constipation and Bladder dysfunction neurogenic bladder:  frequent toileting, ambulate to bathroom with assistance, check post void residuals. Check for C.difficile x1 if >2 loose stools in 24 hours, continue bowel & bladder program.  Monitor bowel and bladder function. Lactinex 2 PO every AC. MOM prn, Brown Bomb prn, Glycerin suppository prn, enema prn. Recurrent UTIs due to urinary retention and intolerance to Flomax at times because of low BP. Trial Proscar avoid Flomax due to low blood pressure check PVRs once UTI is treated and consider Ditropan first bladder spasms recheck UA status post Cipro treatment for Enterococcus faecalis  3. Severe mid and low back parkinsonian stiffness related pain as well as generalized OA pain: reassess pain every shift and prior to and after each therapy session, give prn Tylenol and low-dose Norco avoiding any NSAIDs, modalities prn in therapy, Lidoderm, K-pad prn. Titrate to lowest effective dose of Norco  4. Skin healing bilateral upper extremity bruising and breakdown risk:  continue pressure relief program.  Daily skin exams and reports from nursing. 5. Severe fatigue due to nutritional and hydration deficiency: Add vitamin B12 vitamin D and CoQ10 continue to monitor I&Os, calorie counts prn, dietary consult prn.  6. Acute episodic insomnia with situational adjustment disorder:  prn Ambien, monitor for day time sedation. 7. Falls risk elevated:  patient to use call light to get nursing assistance to get up, bed and chair alarm. 8. Elevated DVT risk: progressive activities in PT, continue prophylaxis BRIAN hose, elevation and avoid all blood thinners due to recent anemia need for transfusion and myelodysplasia. 9. Complex discharge planning:  discharge 7/12/2020 home with his wife and home health care.   He is status post weekly team meeting  Monday to assess progress towards goals, discuss and address social, psychological and medical comorbidities and to address difficulties they may be having progressing in therapy. Patient and family education is in progress. The patient is to follow-up with their family physician after discharge. Complex Active General Medical Issues that complicated care Assess & Plan:    1. Hypertension with   Orthostasis, Aortic valve stenosis, severe, LVH (left ventricular hypertrophy), Diastolic dysfunction-vital signs every shift dose and titrate cardiac medications to include ProAmatine consult hospitalist for backup medical-recheck CBC monitor closely for bleeding  2. Macular degeneration with   Blindness  right eye-usual cues to the right  3. BPH (benign prostatic hyperplasia)-check post void residuals and check recheck UA  4.   PD (Parkinson's disease) -monitor for orthostasis treat comorbidities including pain constipation rigid rigidity and cognitive decline consult neurology and titrate dopaminergic medications  5. MDS (myelodysplastic syndrome), low grade -add B12 recheck CBC add iron with food add Folvite transfuse as needed transfusion scheduled for 7/3/2020  6. Closed fracture of body of lumbar vertebra with active and chronic pain-add Lidoderm add high-dose vitamin D  7. 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 Severe primary orthostatic hypotension-check Ortho BPs titrate cardiac medications add abdominal binder and teds. Consider blood pressure adjustments and resuming Flomax if possible  9. Acute UTI-trial Cipro  -check urinary culture and Sensitivity Place, Mckeon catheter if retention greater than 300 resume Flomax when possible due to blood pressure issues.   Toilet frequently        Maryuri Valenzuela D.O., PM&R     Attending    [address]

## 2020-07-12 NOTE — PROGRESS NOTES
Patient discharge complete. All questions answered. Patient had no signs or symptoms of distress.  Electronically signed by Woodrow Moy RN on 7/12/2020 at 3:01 PM

## 2020-07-13 NOTE — PROGRESS NOTES
Facility/Department: [facility]  Physical Therapy Acute Rehab Discharge Summary  Room: [facility]/R248-    NAME: Jessica Mccarty  : 1940  MRN: [id number]    Admission Date: 2020  6:41 PM  Discharge Date: 2020    Rehab Diagnosis(es): Gait abnormality due to Severe Parkinson's Disease with Blunt Chest Trauma; rehab admission 20  Patient Active Problem List    Diagnosis Date Noted    Acute cystitis without hematuria     Frequency of micturition     BMI 22.0-22.9, adult 2020    Abnormality of gait and mobility 2020    Hematoma     Orthostasis 2020    Gait abnormality due to Severe Parkinson's Disease with Blunt Chest Trauma; rehab admission 20. 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    Primary orthostatic hypotension 2018    Macular degeneration     Glaucoma     BPH (benign prostatic hyperplasia)     Anemia 2014    Aortic valve stenosis, severe 2013    LVH (left ventricular hypertrophy)     Diastolic dysfunction     Hypertension 2011       Past Medical History:   Diagnosis Date    Actinic keratoses     Aortic valve stenosis, severe 2013    BPH (benign prostatic hyperplasia)     Cancer ([address] 2018    skin cancer on chest removed    Colon polyp     Diastolic dysfunction 95/15/2220    Stage 2 by echo    ED (erectile dysfunction) 2011    Glaucoma     Dr. Myla Ontiveros Hemochromatosis     History of echocardiogram 2015    mild AS, mild AR, mild TR, mild MR    [place]    Hypertension     Iron deficiency anemia 4/28/2014    LVH (left ventricular hypertrophy) 9/20/2013    Macular degeneration     bilateral; dry on left and wet on right-Dr. Micah Lancaster on back 5/14/2015    Osteoarthritis cervical spine     Parkinson disease ([facility]     Dr. Edinson Gonzales     Past Surgical History:   Procedure Laterality Date    BACK SURGERY      CARDIAC CATHETERIZATION  11/09/2017    [facility] TUSHAR ECHOLS MD    CATARACT REMOVAL      COLONOSCOPY  11/2009    single polyp right colon    EYE SURGERY Bilateral     OTHER SURGICAL HISTORY  06/08/15    EXC SUBCU MASS BACK    TONSILLECTOMY         Indications for Skilled Intervention: Decreased functional mobility , Decreased strength, Decreased safe awareness, Decreased balance    GOALS:  Short term goals  Short term goal 1: Pt to complete HEP with indep - Met  Short term goal 2: Pt to achieve TUG in 13.5 sec to demonstrate safe functional mobility with decreased falls risk - 24sec  Long term goals  Long term goal 1: Pt to complete all bed mobility with indep - Supervision  Long term goal 2: Pt to complete all transfers with indep - Supervision  Long term goal 3: Pt to ambulate 150ft with LRD and indep in protected/home environments, and supervision in the community - Supervision  Long term goal 4: Pt to manage 4 steps with HR and indep; curb step with indep - Supervision    Summary of POC: Throughout rehab stay, pt received skilled physical therapy treatment 1.5 hours daily for 1.5 weeks.     Skilled Services Provided: Strengthening, Balance Training, ADL/Self-care Training, Transfer Training, Functional Mobility Training, Gait Training, Stair training, Endurance Training, Neuromuscular Re-education, Safety Education & Training, Home Exercise Program, Patient/Caregiver Education & Training, Equipment Evaluation, Education, & procurement, Manual Therapy - Soft Tissue Mobilization, Positioning, Modalities, ROM (Please refer to daily notes for all treatment details)    ASSESSMENT: Pt requires intermittent supervision for mobility d/t impulsive behavior. Spouse states that this is baseline for him, and she provides cues PRN normally at home. Rec f/u PT program for continued balance and gait training. Discharge Plan: DC home at ambulatory level.      Electronically signed by Jacques Allison PT on 7/13/2020 at 9:01 AM

## 2020-07-14 ENCOUNTER — CARE COORDINATION (OUTPATIENT)
Dept: CASE MANAGEMENT | Age: 80
End: 2020-07-14

## 2020-07-14 PROCEDURE — 1111F DSCHRG MED/CURRENT MED MERGE: CPT | Performed by: FAMILY MEDICINE

## 2020-07-14 NOTE — CARE COORDINATION
[place]   7/23/2020  9:30 AM [address] [address]   8/25/2020 11:30 AM Tiera Pedro, APRN - [address], [address] [facility]

## 2020-07-22 ENCOUNTER — CARE COORDINATION (OUTPATIENT)
Dept: CASE MANAGEMENT | Age: 80
End: 2020-07-22

## 2020-07-23 ENCOUNTER — OFFICE VISIT (OUTPATIENT)
Dept: UROLOGY | Age: 80
End: 2020-07-23
Payer: MEDICARE

## 2020-07-23 VITALS
HEIGHT: 72 IN | WEIGHT: 162 LBS | HEART RATE: 78 BPM | SYSTOLIC BLOOD PRESSURE: 104 MMHG | BODY MASS INDEX: 21.94 KG/M2 | DIASTOLIC BLOOD PRESSURE: 70 MMHG

## 2020-07-23 PROCEDURE — G8427 DOCREV CUR MEDS BY ELIG CLIN: HCPCS | Performed by: UROLOGY

## 2020-07-23 PROCEDURE — 99214 OFFICE O/P EST MOD 30 MIN: CPT | Performed by: UROLOGY

## 2020-07-23 PROCEDURE — 51798 US URINE CAPACITY MEASURE: CPT | Performed by: UROLOGY

## 2020-07-23 PROCEDURE — 1036F TOBACCO NON-USER: CPT | Performed by: UROLOGY

## 2020-07-23 PROCEDURE — 4040F PNEUMOC VAC/ADMIN/RCVD: CPT | Performed by: UROLOGY

## 2020-07-23 PROCEDURE — 1123F ACP DISCUSS/DSCN MKR DOCD: CPT | Performed by: UROLOGY

## 2020-07-23 PROCEDURE — G8420 CALC BMI NORM PARAMETERS: HCPCS | Performed by: UROLOGY

## 2020-07-23 PROCEDURE — 1111F DSCHRG MED/CURRENT MED MERGE: CPT | Performed by: UROLOGY

## 2020-07-23 RX ORDER — OXYBUTYNIN CHLORIDE 5 MG/1
5 TABLET, EXTENDED RELEASE ORAL DAILY
Qty: 90 TABLET | Refills: 3 | Status: SHIPPED | OUTPATIENT
Start: 2020-07-23 | End: 2021-12-13

## 2020-07-23 RX ORDER — FINASTERIDE 5 MG/1
5 TABLET, FILM COATED ORAL DAILY
Qty: 90 TABLET | Refills: 3 | Status: SHIPPED | OUTPATIENT
Start: 2020-07-23 | End: 2020-08-25 | Stop reason: SDUPTHER

## 2020-07-23 RX ORDER — MIDODRINE HYDROCHLORIDE 5 MG/1
5 TABLET ORAL 3 TIMES DAILY
COMMUNITY
End: 2020-08-25 | Stop reason: ALTCHOICE

## 2020-07-23 ASSESSMENT — ENCOUNTER SYMPTOMS: ABDOMINAL PAIN: 0

## 2020-07-23 NOTE — PROGRESS NOTES
Subjective:      Patient ID: Michael Chapa is a 79 y.o. male. HPI  This is an 80 yo male with Parkinson's dz, AV stenosis, HTN, OA, BPH and seen in consult at [facility] on 7/8/20 after he was admitted for a fall due to dizziness and dehydration. At the time he was on Flomax and this was stopped. He was seen while in rehab and started on Proscar and low dose Ditropan 5 mg for frequency and is back in follow-up. He had been on Flomax for a few years prior to the recent admit but did not think it was helping much. He has a good flow and no hematuria or dysuria or pain. He is still getting intermittent frequency and urgency. He has Nf 3-4 but can be as high as 8-10. Over the last few nights this has improved. He just started the Ditropan and Proscar and I explained these may take time for effect to the patient and his wife. He has no other complaints. He voided prior to the visit.        Past Medical History:   Diagnosis Date    Actinic keratoses     Aortic valve stenosis, severe 9/20/2013    BPH (benign prostatic hyperplasia)     Cancer ([facility] [address] 04/2018    skin cancer on chest removed    Colon polyp 81/27/1103    Diastolic dysfunction 69/17/2497    Stage 2 by echo    ED (erectile dysfunction) 11/29/2011    Glaucoma     Dr. Cathy Cheatham Hemochromatosis     History of echocardiogram 02/18/2015    mild AS, mild AR, mild TR, mild MR    [place]    Hypertension     Iron deficiency anemia 4/28/2014    LVH (left ventricular hypertrophy) 9/20/2013    Macular degeneration     bilateral; dry on left and wet on right-Dr. Latham Abtala on back 5/14/2015    Osteoarthritis cervical spine     Parkinson disease ([facility] [address]     Dr. 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   Social Needs    Financial resource strain: Not hard at all   [place] insecurity     Worry: Never true     Inability: Never true    Transportation needs     Medical: No     Non-medical: No   Tobacco Use    Smoking status: Never Smoker    Smokeless tobacco: Never Used   Substance and Sexual Activity    Alcohol use: Yes     Comment: rare    Drug use: No    Sexual activity: Not Currently     Partners: Female   Lifestyle    Physical activity     Days per week: 3 days     Minutes per session: 20 min    Stress: Only a little   Relationships    Social connections     Talks on phone: More than three times a week     Gets together: Once a week     Attends Mosque service: 1 to 4 times per year     Active member of club or organization: No     Attends meetings of clubs or organizations: Never     Relationship status:     Intimate partner violence     Fear of current or ex partner: No     Emotionally abused: No     Physically abused: No     Forced sexual activity: No   Other Topics Concern    None   Social History Narrative    Lives with wife. She is in good health and she still drives. He is retired. He has 3 children all of which live out of the area one in [facility] 1 in [place] and one in the [facility].     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 Pt reports high level of indep prior to admission. History reviewed. No pertinent family history. Current Outpatient Medications   Medication Sig Dispense Refill    midodrine (PROAMATINE) 5 MG tablet Take 5 mg by mouth 3 times daily      lactobacillus acidophilus (FLORANEX) Take 2 tablets by mouth 3 times daily 120 tablet 1    finasteride (PROSCAR) 5 MG tablet Take 1 tablet by mouth daily 30 tablet 3    oxybutynin (DITROPAN-XL) 5 MG extended release tablet Take 1 tablet by mouth nightly 30 tablet 3    Cholecalciferol (VITAMIN D3) 2000 units CAPS Take 1,000 Units by mouth daily      vitamin B-12 (CYANOCOBALAMIN) 1000 MCG tablet Take 1,000 mcg by mouth daily       Dorzolamide HCl-Timolol Mal (COSOPT OP) Apply 1 drop to eye 2 times daily      folic acid (FOLVITE) 144 MCG tablet Take 1 tablet by mouth daily       carbidopa-levodopa (SINEMET)  MG per tablet Take 1 tablet by mouth 3 times daily      bimatoprost (LUMIGAN) 0.03 % ophthalmic drops Place 1 drop into the left eye nightly. No current facility-administered medications for this visit. Cat hair extract  reviewed    Review of Systems   Constitutional: Negative for fever. Gastrointestinal: Negative for abdominal pain. Genitourinary: Negative for dysuria, flank pain and hematuria. Objective:   Physical Exam  Constitutional:       Appearance: Normal appearance. Abdominal:      General: There is no distension. Palpations: Abdomen is soft. Tenderness: There is no abdominal tenderness. There is no guarding or rebound. Neurological:      Mental Status: He is alert. Psychiatric:         Mood and Affect: Mood normal.         7/11/2020  3:14 PM - Marshall jose enrique Incoming Lab Results From [facility]     Specimen Information:  Urine, clean catch          Component  Value  Ref Range & Units  Status  Collected  Lab    Color, UA  Yellow  Straw/Yellow  Final  07/11/2020 11:00 AM  [facility] [address], [place]  Clear  Clear  Final  07/11/2020 11:00 AM  [address] [facility] [facility]    Glucose, Ur  Negative  Negative mg/dL  Final  07/11/2020 11:00 AM  [address] [facility] [facility]    Bilirubin Urine  Negative  Negative  Final  07/11/2020 11:00 AM  [address] [facility] [facility]    Ketones, Urine  15Abnormal    Negative mg/dL  Final  07/11/2020 11:00 AM  [address] [facility]    Specific Gravity, UA  1.024  1.005 - 1.030  Final  07/11/2020 11:00 AM  [address] [facility]    Blood, Urine  Negative  Negative  Final  07/11/2020 11:00 AM  [address] [facility]    pH, UA  5.0  5.0 - 9.0  Final  07/11/2020 11:00 AM  [address] [facility]    Protein, UA  Negative  Negative mg/dL  Final  07/11/2020 11:00 AM  [address] [facility]    Urobilinogen, Urine  0.2  <2.0 E.U./dL  Final  07/11/2020 11:00 AM  [address] [facility]    Nitrite, Urine  Negative  Negative  Final  07/11/2020 11:00 AM  [address] [facility]    Leukocyte Esterase, Urine  Negative  Negative  Final  07/11/2020 11:00 AM  [address] [facility]    Urine Reflex to Culture  Not Indicated   Final  07/11/2020 11:00 AM  [address] [facility]    Testing Performed By     7/8/2020  7:45 AM - Marshall jose enrique Incoming Lab Results From [facility]     Specimen Information:  Urine, clean catch          Component  Collected  Lab    Organism Abnormal    07/05/2020  9:15 PM  [address] [facility]    Enterococcus faecalis    Urine Culture, Routine  07/05/2020  9:15 PM  [address] [facility]    >100,000 CFU/ml    Testing Performed By     Lab - Abbreviation  Name  Director  Address  Valid Date Range    [address] [place] [address] [id number]         FINDINGS: Baljeet Jacobsen is subcutaneous \"fat stranding\" involving the right posteriolateral abdominal wall compatible with an abdominal wall contusion. In addition, there is an approximately 10 x 5 x 10 cm right posterior abdominal wall hematoma with    concomitant chip fracture from the right transverse process of the L2 vertebra. There is no intraperitoneal or retroperitoneal hematoma. There is no \"free fluid\", abscess or pneumoperitoneum in abdomen.         There is hepatic steatosis and several hepatic cysts which were reported on a 6/4/2015 abdominal ultrasound scan. No evidence of a liver laceration. An ultrasound scan may be obtained to further evaluate the hepatic cysts if clinically warranted. The    spleen, pancreas and adrenal glands appear normal. There is bilateral renal cortical atrophy and bilateral renal cysts. There is a 2 cm exophytic round mass in the interpolar region of the left kidney which may very well represent a so-called \"hyperdense     cyst\" however, a solid left renal mass not totally excluded. There is no nephrolithiasis or hydronephrosis. There is no renal laceration or fracture. There is atherosclerotic calcification of the abdominal aorta without an aneurysm. No retroperitoneal    adenopathy.         There is a slightly distended stomach with an air/fluid level. Nondistended small bowel unremarkable and no SBO. Nondistended colon unremarkable. There is no CT evidence of bowel perforation.         CT scan of pelvis demonstrates a relatively normal size prostate gland and seminal vesicles. Urinary bladder appears normal. No pelvic mass, adenopathy or \"free fluid\" in the pelvis. Also, there is no pelvic hematoma.         CT scans with bone window settings demonstrate degenerative and arthritic changes in the lumbar spine. Pelvis appears intact and there is no pelvic fracture. Visualized portions of the hips appear unremarkable.              Impression    1.  SOFT TISSUE CONTUSION INVOLVING THE RIGHT POSTEROLATERAL ABDOMINAL WALL. 2. 10 X 5 X 10 CM RIGHT POSTERIOR ABDOMINAL WALL HEMATOMA WITH L2 CHIP FRACTURE. 3. NO INTRAPERITONEAL OR RETROPERITONEAL HEMORRHAGE OR HEMATOMA. 4. NO ABDOMINAL ORGAN TRAUMATIC INJURY OR ABDOMINAL ORGAN LACERATION. 5. DEGENERATIVE BONE CHANGES IN THE LUMBAR SPINE AND NO LUMBAR SPINE FRACTURE. 6. NO PELVIC FRACTURE AND ADDITIONAL INCIDENTAL FINDINGS IN BODY OF REPORT.              All CT scans at this facility use dose modulation, iterative reconstruction, and/or weight based dosing when appropriate to reduce radiation dose to as low as reasonably achievable. PSA   Date Value Ref Range Status   04/25/2018 0.69 0.00 - 6.22 ng/mL Final     Comment:     When the Total PSA is between 3.00 and 10.00 ng/mL, consider  requesting a Free PSA to aid in diagnosis. 04/26/2017 0.87 0.00 - 6.22 ng/mL Final     Comment:     When the Total PSA is between 3.00 and 10.00 ng/mL, consider  requesting a Free PSA to aid in diagnosis. 05/05/2014 0.56 0.00 - 6.22 ng/mL Final     Comment:     When the Total PSA is between 3.00 and 10.00 ng/mL, consider  requesting a Free PSA to aid in diagnosis. 12/06/2012 0.89 0.00 - 4.00 ng/mL Final     Comment:     When the Total PSA is between 3.00 and 10.00 ng/mL, consider requesting a  Free PSA to aid in diagnosis. Free PSA is measured, as necessary, when Directed PSA is requested. 12/01/2011 1.81 0.00 - 4.00 ng/mL Final     Comment:     When the Total PSA is between 3.00 and 10.00 ng/mL, consider requesting a  Free PSA to aid in diagnosis. Free PSA is measured, as necessary, when Directed PSA is requested. post void residual by U/S: 24 cc    Assessment: This is an 80 yo male with Parkinson's dz, AV stenosis, HTN, OA, BPH and with mainly urinary frequency and urgency being the most bothersome complaints.  I explained these symptoms are likely due to the Parkinson's Dz and he is emptying the

## 2020-07-29 ENCOUNTER — CARE COORDINATION (OUTPATIENT)
Dept: CASE MANAGEMENT | Age: 80
End: 2020-07-29

## 2020-08-06 ENCOUNTER — CARE COORDINATION (OUTPATIENT)
Dept: CASE MANAGEMENT | Age: 80
End: 2020-08-06

## 2020-08-06 NOTE — CARE COORDINATION
Attempted to reach pt for follow up call. Left message requesting call back.     Hany Tidwell RN  Care Transition Coordinator  [phone]

## 2020-08-25 ENCOUNTER — OFFICE VISIT (OUTPATIENT)
Dept: NEUROLOGY | Age: 80
End: 2020-08-25
Payer: MEDICARE

## 2020-08-25 VITALS
BODY MASS INDEX: 21.82 KG/M2 | WEIGHT: 160.9 LBS | HEART RATE: 81 BPM | SYSTOLIC BLOOD PRESSURE: 104 MMHG | DIASTOLIC BLOOD PRESSURE: 69 MMHG

## 2020-08-25 PROCEDURE — 99214 OFFICE O/P EST MOD 30 MIN: CPT | Performed by: NURSE PRACTITIONER

## 2020-08-25 PROCEDURE — G8427 DOCREV CUR MEDS BY ELIG CLIN: HCPCS | Performed by: NURSE PRACTITIONER

## 2020-08-25 PROCEDURE — 4040F PNEUMOC VAC/ADMIN/RCVD: CPT | Performed by: NURSE PRACTITIONER

## 2020-08-25 PROCEDURE — G8420 CALC BMI NORM PARAMETERS: HCPCS | Performed by: NURSE PRACTITIONER

## 2020-08-25 PROCEDURE — 1123F ACP DISCUSS/DSCN MKR DOCD: CPT | Performed by: NURSE PRACTITIONER

## 2020-08-25 PROCEDURE — 1036F TOBACCO NON-USER: CPT | Performed by: NURSE PRACTITIONER

## 2020-08-25 RX ORDER — DROXIDOPA 100 MG/1
100 CAPSULE ORAL 3 TIMES DAILY
Qty: 90 CAPSULE | Refills: 3 | Status: ON HOLD | OUTPATIENT
Start: 2020-08-25 | End: 2021-08-21

## 2020-08-25 ASSESSMENT — ENCOUNTER SYMPTOMS
VOMITING: 0
CHEST TIGHTNESS: 0
COUGH: 0
NAUSEA: 0
SHORTNESS OF BREATH: 0
COLOR CHANGE: 0
WHEEZING: 0
TROUBLE SWALLOWING: 0

## 2020-09-04 ENCOUNTER — TELEPHONE (OUTPATIENT)
Dept: NEUROLOGY | Age: 80
End: 2020-09-04

## 2020-09-04 NOTE — TELEPHONE ENCOUNTER
Chacho Galdamez was denied, pharmacy is faxing form for assistance program. I will fill out and see what we can get for the patient .         Mariela Morin

## 2020-09-10 RX ORDER — DROXIDOPA 100 MG/1
100 CAPSULE ORAL 3 TIMES DAILY
Qty: 90 CAPSULE | Refills: 0 | COMMUNITY
Start: 2020-09-10 | End: 2020-10-20

## 2020-10-14 LAB
ALT SERPL-CCNC: <5 U/L (ref 0–41)
ANION GAP SERPL CALCULATED.3IONS-SCNC: 15 MEQ/L (ref 9–15)
AST SERPL-CCNC: 13 U/L (ref 0–40)
BUN BLDV-MCNC: 13 MG/DL (ref 8–23)
CALCIUM SERPL-MCNC: 8.5 MG/DL (ref 8.5–9.9)
CHLORIDE BLD-SCNC: 100 MEQ/L (ref 95–107)
CO2: 24 MEQ/L (ref 20–31)
CREAT SERPL-MCNC: 0.66 MG/DL (ref 0.7–1.2)
FOLATE: 5.4 NG/ML (ref 7.3–26.1)
GFR AFRICAN AMERICAN: >60
GFR NON-AFRICAN AMERICAN: >60
GLUCOSE BLD-MCNC: 87 MG/DL (ref 70–99)
HCT VFR BLD CALC: 35.5 % (ref 42–52)
HEMOGLOBIN: 11.8 G/DL (ref 14–18)
MCH RBC QN AUTO: 34.5 PG (ref 27–31.3)
MCHC RBC AUTO-ENTMCNC: 33.3 % (ref 33–37)
MCV RBC AUTO: 103.7 FL (ref 80–100)
PDW BLD-RTO: 19 % (ref 11.5–14.5)
PLATELET # BLD: 139 K/UL (ref 130–400)
POTASSIUM SERPL-SCNC: 4.1 MEQ/L (ref 3.4–4.9)
RBC # BLD: 3.42 M/UL (ref 4.7–6.1)
SODIUM BLD-SCNC: 139 MEQ/L (ref 135–144)
VITAMIN B-12: 449 PG/ML (ref 232–1245)
VITAMIN D 25-HYDROXY: 49.8 NG/ML (ref 30–100)
WBC # BLD: 4.5 K/UL (ref 4.8–10.8)

## 2020-10-19 RX ORDER — DROXIDOPA 100 MG/1
100 CAPSULE ORAL 3 TIMES DAILY
Qty: 180 CAPSULE | Refills: 0 | COMMUNITY
Start: 2020-10-19 | End: 2020-10-20

## 2020-10-20 ENCOUNTER — OFFICE VISIT (OUTPATIENT)
Dept: UROLOGY | Age: 80
End: 2020-10-20
Payer: MEDICARE

## 2020-10-20 VITALS
WEIGHT: 159 LBS | HEIGHT: 72 IN | SYSTOLIC BLOOD PRESSURE: 136 MMHG | BODY MASS INDEX: 21.54 KG/M2 | HEART RATE: 69 BPM | DIASTOLIC BLOOD PRESSURE: 80 MMHG

## 2020-10-20 PROCEDURE — 1123F ACP DISCUSS/DSCN MKR DOCD: CPT | Performed by: UROLOGY

## 2020-10-20 PROCEDURE — G8427 DOCREV CUR MEDS BY ELIG CLIN: HCPCS | Performed by: UROLOGY

## 2020-10-20 PROCEDURE — G8484 FLU IMMUNIZE NO ADMIN: HCPCS | Performed by: UROLOGY

## 2020-10-20 PROCEDURE — G8420 CALC BMI NORM PARAMETERS: HCPCS | Performed by: UROLOGY

## 2020-10-20 PROCEDURE — 1036F TOBACCO NON-USER: CPT | Performed by: UROLOGY

## 2020-10-20 PROCEDURE — 51798 US URINE CAPACITY MEASURE: CPT | Performed by: UROLOGY

## 2020-10-20 PROCEDURE — 4040F PNEUMOC VAC/ADMIN/RCVD: CPT | Performed by: UROLOGY

## 2020-10-20 PROCEDURE — 99213 OFFICE O/P EST LOW 20 MIN: CPT | Performed by: UROLOGY

## 2020-10-20 NOTE — PROGRESS NOTES
Subjective:      Patient ID: Abhishek Mayfield is a 79 y.o. male. HPI  This is an 78 yo male with Parkinson's dz, AV stenosis, HTN, OA, BPH and back in follow-up with frequency and urgency related to Parkinson's. Since last seen on 7/23/20, he has no new complaints and remains on Proscar and Ditropan 5 mg Xl. He is still having NF 7-10 and some urgency. He has no SE from these medications. He is here with his wife and he has no other new complaints. He voided prior to the visit.      Past Medical History:   Diagnosis Date    Actinic keratoses     Aortic valve stenosis, severe 9/20/2013    BPH (benign prostatic hyperplasia)     Cancer ([facility] [address] 04/2018    skin cancer on chest removed    Colon polyp 87/12/0326    Diastolic dysfunction 58/30/1570    Stage 2 by echo    ED (erectile dysfunction) 11/29/2011    Glaucoma     Dr. Pablo Khan Hemochromatosis     History of echocardiogram 02/18/2015    mild AS, mild AR, mild TR, mild MR    [place]    Hypertension     Iron deficiency anemia 4/28/2014    LVH (left ventricular hypertrophy) 9/20/2013    Macular degeneration     bilateral; dry on left and wet on right-Dr. Clayton Whatley on back 5/14/2015    Osteoarthritis cervical spine     Parkinson disease ([facility] [address]     Dr. 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   Social Needs    Financial resource strain: Not hard at all   Francisco Snell Food insecurity     Worry: Never true     Inability: Never true    Transportation needs     Medical: No     Non-medical: No   Tobacco Use    Smoking status: Never Smoker    Smokeless tobacco: Never Used   Substance and Sexual Activity    Alcohol use: Yes     Comment: rare    Drug use: No    Sexual activity: Not Currently     Partners: Female   Lifestyle    Physical activity     Days per week: 3 days     Minutes per session: 20 min    Stress: Only a little   Relationships    Social connections     Talks on phone: More than three times a week     Gets together: Once a week     Attends Denominational service: 1 to 4 times per year     Active member of club or organization: No     Attends meetings of clubs or organizations: Never     Relationship status:     Intimate partner violence     Fear of current or ex partner: No     Emotionally abused: No     Physically abused: No     Forced sexual activity: No   Other Topics Concern    None   Social History Narrative    Lives with wife. She is in good health and she still drives. He is retired. He has 3 children all of which live out of the area one in [facility] 1 in [place] and one in the [facility]. 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 Pt reports high level of indep prior to admission. History reviewed.  No pertinent family history. Current Outpatient Medications   Medication Sig Dispense Refill    Droxidopa (NORTHERA) 100 MG CAPS Take 1 capsule by mouth 3 times daily 90 capsule 3    oxybutynin (DITROPAN XL) 5 MG extended release tablet Take 1 tablet by mouth daily 90 tablet 3    lactobacillus acidophilus (FLORANEX) Take 2 tablets by mouth 3 times daily 120 tablet 1    finasteride (PROSCAR) 5 MG tablet Take 1 tablet by mouth daily 30 tablet 3    Cholecalciferol (VITAMIN D3) 2000 units CAPS Take 1,000 Units by mouth daily      vitamin B-12 (CYANOCOBALAMIN) 1000 MCG tablet Take 1,000 mcg by mouth daily       Dorzolamide HCl-Timolol Mal (COSOPT OP) Apply 1 drop to eye 2 times daily      folic acid (FOLVITE) 684 MCG tablet Take 1 tablet by mouth daily       carbidopa-levodopa (SINEMET)  MG per tablet Take 1 tablet by mouth 3 times daily      bimatoprost (LUMIGAN) 0.03 % ophthalmic drops Place 1 drop into the left eye nightly. No current facility-administered medications for this visit. Cat hair extract  reviewed      Review of Systems   Constitutional: Negative for unexpected weight change. Genitourinary: Positive for frequency and urgency. Negative for difficulty urinating, dysuria and hematuria. Objective:   Physical Exam  Constitutional:       Appearance: Normal appearance. Neurological:      Mental Status: He is alert. Psychiatric:         Mood and Affect: Mood normal.         Behavior: Behavior normal.       post void residual by U/S: 32 cc    Assessment: This is an 78 yo male with Parkinson's dz, AV stenosis, HTN, OA, BPH and back in follow-up with frequency and urgency related to Parkinson's and is emptying the bladder by PVR. I discussed the option of increasing the Ditropan dose and the possible effect it could have on mentation and they do not want to consider this option currently. Will continue with present management and I suggested a urinal at bedside. Plan:      1.  F/U 1 yr for Gifford Hammans, MD

## 2020-10-22 RX ORDER — FINASTERIDE 5 MG/1
5 TABLET, FILM COATED ORAL DAILY
Qty: 90 TABLET | Refills: 3 | Status: ON HOLD | OUTPATIENT
Start: 2020-10-22 | End: 2021-08-23 | Stop reason: HOSPADM

## 2020-11-04 ENCOUNTER — OFFICE VISIT (OUTPATIENT)
Dept: NEUROLOGY | Age: 80
End: 2020-11-04
Payer: MEDICARE

## 2020-11-04 VITALS
BODY MASS INDEX: 21.78 KG/M2 | SYSTOLIC BLOOD PRESSURE: 89 MMHG | HEART RATE: 81 BPM | WEIGHT: 160.6 LBS | DIASTOLIC BLOOD PRESSURE: 66 MMHG

## 2020-11-04 PROCEDURE — 99213 OFFICE O/P EST LOW 20 MIN: CPT | Performed by: NURSE PRACTITIONER

## 2020-11-04 PROCEDURE — 1036F TOBACCO NON-USER: CPT | Performed by: NURSE PRACTITIONER

## 2020-11-04 PROCEDURE — G8420 CALC BMI NORM PARAMETERS: HCPCS | Performed by: NURSE PRACTITIONER

## 2020-11-04 PROCEDURE — G8484 FLU IMMUNIZE NO ADMIN: HCPCS | Performed by: NURSE PRACTITIONER

## 2020-11-04 PROCEDURE — G8427 DOCREV CUR MEDS BY ELIG CLIN: HCPCS | Performed by: NURSE PRACTITIONER

## 2020-11-04 PROCEDURE — 1123F ACP DISCUSS/DSCN MKR DOCD: CPT | Performed by: NURSE PRACTITIONER

## 2020-11-04 PROCEDURE — 4040F PNEUMOC VAC/ADMIN/RCVD: CPT | Performed by: NURSE PRACTITIONER

## 2020-11-04 RX ORDER — L. ACIDOPHILUS/L.BULGARICUS 1MM CELL
4 TABLET ORAL 3 TIMES DAILY
COMMUNITY
End: 2021-11-15

## 2020-11-04 ASSESSMENT — ENCOUNTER SYMPTOMS
SHORTNESS OF BREATH: 0
COUGH: 0
COLOR CHANGE: 0
NAUSEA: 0
WHEEZING: 0
CHEST TIGHTNESS: 0
TROUBLE SWALLOWING: 0
VOMITING: 0

## 2020-11-04 NOTE — PROGRESS NOTES
Subjective:      Patient ID: Luana Ruby is a 79 y.o. male who presents today for:  Chief Complaint   Patient presents with    Follow-up     Pts wife states that northera was not covered with insurance and would like to know what they are to do about this. It looks like arline was working on something for it. Pt states that he is doing good. Pt states he is going through therapy right now and he does get dizzy at times when they are working him out. HPI  11/4/2020:  Patient seen and examined for 3-month follow-up visit for Parkinson's with autonomic dysfunction. Patient was sick nephric and orthostatic hypotension due to autonomic dysfunction from his Parkinson's disease. He continues on Northera 100 mg 3 times a day. He is asymptomatic. No falls reported. Patient reports he has learned to stand for several minutes after getting up prior to ambulating. He reports that overall his Parkinson symptoms are well managed. He does have shuffling gait but is currently working with physical therapy through [facility] in [facility] and is doing well. He denies tremor currently although he does have mild resting tremor of bilateral hands. No agitation, hallucinations, periods of confusion. Appetite fair. Denies dysphagia. 8/25/2020:  Pt seen and examined in the office for hospital follow up.  Patient is an 79-year-old male with past medical history of Parkinson's disease, macular degeneration, iron deficiency anemia, hypertension, hematochrosis, glaucoma,'s BPH, aortic valve stenosis who was admitted to [facility] from 6/29/2020 to 7/12024 recent falls with recurrent syncope.  Patient was found to have 10 cm posterior abdominal wall hematoma and L2 fracture.  Syncope was found to be secondary to significant orthostatic hypotension due to autonomic dysfunction from Parkinson's disease.  Patient was initiated on midodrine with improvement of symptoms.  He was also noted to have acute blood loss anemia secondary to posterior abdominal wall hematoma.  He did receive transfusions. Patient was transferred to [facility] inpatient rehab from 7/1/2020 until 7/12/2020. During his stay on rehab patient was taken off midodrine and initiated on Northera due to significant orthostatic hypotension. Wife reports that this was not continued after discharge as they were unable to get it covered by insurance and were unaware of how to get assistance with that. Patient has since been on the midodrine 5 mg 3 times daily. He is still noted to be significantly orthostatic today with a drop in systolic blood pressure from 1 . Patient reports that he is still somewhat symptomatic with this with lightheadedness although this has improved since hospital discharge. Patient currently alert and oriented x3, no acute distress, cooperative. He is accompanied to his appointment today by his wife. Overall he reports he is doing well. No falls. Parkinson symptoms well managed on current dose of carbidopa levodopa.   He voices no new or acute neuro complaints or concerns  Past Medical History:   Diagnosis Date    Actinic keratoses     Aortic valve stenosis, severe 9/20/2013    BPH (benign prostatic hyperplasia)     Cancer ([address] 04/2018    skin cancer on chest removed    Colon polyp 26/90/1628    Diastolic dysfunction 75/24/2639    Stage 2 by echo    ED (erectile dysfunction) 11/29/2011    Glaucoma     Dr. Estefania Mendez Hemochromatosis     History of echocardiogram 02/18/2015    mild AS, mild AR, mild TR, mild MR    [place]    Hypertension     Iron deficiency anemia 4/28/2014    LVH (left ventricular hypertrophy) 9/20/2013    Macular degeneration     bilateral; dry on left and wet on right-Dr. Keke Reyes Mass on back 5/14/2015    Osteoarthritis cervical spine     Parkinson disease ([facility]     Dr. 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   Social Needs    Financial resource strain: Not hard at all   Luz-Ariel insecurity     Worry: Never true     Inability: Never true    Transportation needs     Medical: No     Non-medical: No   Tobacco Use    Smoking status: Never Smoker    Smokeless tobacco: Never Used   Substance and Sexual Activity    Alcohol use: Yes     Comment: rare    Drug use: No    Sexual activity: Not Currently     Partners: Female   Lifestyle    Physical activity     Days per week: 3 days     Minutes per session: 20 min    Stress: Only a little   Relationships    Social connections     Talks on phone: More than three times a week     Gets together: Once a week     Attends [employer] service: 1 to 4 times per year     Active member of club or organization: No     Attends meetings of clubs or organizations: Never     Relationship status:     Intimate partner violence     Fear of current or ex partner: No     Emotionally abused: No     Physically abused: No     Forced sexual activity: No   Other Topics Concern    Not on file   Social History Narrative    Lives with wife. She is in good health and she still drives. He is retired. He has 3 children all of which live out of the area one in [place] 1 in [place] and one in the [place] include 234 beds at the [facility].     Type of Home: House-[address] [place] [address] in [place]     Home Layout: Two level, Bed/Bath upstairs, 1/2 bath on main level    Home Access: Review of Systems   Constitutional: Positive for appetite change. Negative for chills, fatigue and fever. HENT: Negative for hearing loss and trouble swallowing. Eyes: Negative for visual disturbance. Respiratory: Negative for cough, chest tightness, shortness of breath and wheezing. Cardiovascular: Negative for chest pain, palpitations and leg swelling. Gastrointestinal: Negative for nausea and vomiting. Musculoskeletal: Positive for gait problem. Skin: Negative for color change and rash. Neurological: Negative for dizziness, tremors, seizures, syncope, facial asymmetry, speech difficulty, weakness, light-headedness, numbness and headaches. Psychiatric/Behavioral: Negative for agitation, confusion and hallucinations. The patient is not nervous/anxious. Objective:   BP 89/66 (Site: Right Upper Arm, Position: Sitting, Cuff Size: Medium Adult)   Pulse 81   Wt 160 lb 9.6 oz (72.8 kg)   BMI 21.78 kg/m²     Physical Exam  Vitals signs reviewed. Constitutional:       General: He is not in acute distress. Appearance: He is not ill-appearing or diaphoretic. HENT:      Head: Normocephalic and atraumatic. Eyes:      General: No visual field deficit. Extraocular Movements: Extraocular movements intact. Pupils: Pupils are equal, round, and reactive to light. Cardiovascular:      Rate and Rhythm: Normal rate and regular rhythm. Pulmonary:      Effort: Pulmonary effort is normal. No respiratory distress. Breath sounds: Normal breath sounds. Skin:     General: Skin is warm and dry. Neurological:      Mental Status: He is alert and oriented to person, place, and time. Cranial Nerves: No dysarthria or facial asymmetry. Motor: Tremor (  Bilateral hands resting) present. No atrophy, abnormal muscle tone or seizure activity. Coordination: Romberg sign negative. Gait: Gait abnormal (  Shuffling, stooped posture). No results found.     Lab Results   Component Value Date    WBC 4.5 10/14/2020    RBC 3.42 10/14/2020    RBC 3.79 12/01/2011    HGB 11.8 10/14/2020    HCT 35.5 10/14/2020    .7 10/14/2020    MCH 34.5 10/14/2020    MCHC 33.3 10/14/2020    RDW 19.0 10/14/2020     10/14/2020    MPV 8.1 07/06/2015     Lab Results   Component Value Date     10/14/2020    K 4.1 10/14/2020    K 3.6 07/01/2020     10/14/2020    CO2 24 10/14/2020    BUN 13 10/14/2020    CREATININE 0.66 10/14/2020    GFRAA >60.0 10/14/2020    LABGLOM >60.0 10/14/2020    GLUCOSE 87 10/14/2020    GLUCOSE 81 12/01/2011    PROT 6.1 06/29/2020    LABALBU 3.7 06/29/2020    LABALBU 4.5 12/01/2011    CALCIUM 8.5 10/14/2020    BILITOT 0.5 06/29/2020    ALKPHOS 37 06/29/2020    AST 13 10/14/2020    ALT <5 10/14/2020     Lab Results   Component Value Date    PROTIME 13.0 06/30/2020    INR 1.0 06/30/2020     Lab Results   Component Value Date    TSH 3.080 06/30/2020    SAYUPVHN07 449 10/14/2020    FOLATE 5.4 10/14/2020    FERRITIN 842.5 09/10/2018    IRON 154 11/13/2019    TIBC 318 11/13/2019     Lab Results   Component Value Date    TRIG 45 11/13/2019     11/13/2019    LDLCALC 74 11/13/2019     Lab Results   Component Value Date    LABAMPH Neg 07/14/2017    BARBSCNU Neg 07/14/2017    LABBENZ Neg 07/14/2017    OPIATESCREENURINE Neg 07/14/2017    PHENCYCLIDINESCREENURINE Neg 07/14/2017    ETOH 137 10/15/2019     No results found for: LITHIUM, DILFRTOT, VALPROATE    Assessment and Plan:      1. PD (Parkinson's disease) ([facility]  -Stable  -Continue current dose of carbidopa levodopa 25/100 mg 3 times daily  -Continue with physical therapy  -Patient will follow up with Dr. Dotty Ellington at next appointment for further management of Parkinson's disease. Patient and his wife are going to [place] this month and will return in April. He will follow up with us after his return. He was advised to call in the interim if any concerns or questions or new symptoms develop.     2. Primary orthostatic hypotension  -Asymptomatic  -Continue Northera 100 mg 3 times daily. Will consider increase in dose at next appointment if needed  -Discussed safety when standing before ambulating  -BRIAN hose      Return in about 6 months (around 5/4/2021), or if symptoms worsen or fail to improve.     Jose De Jesus Dupree, APRN - CNP

## 2020-11-05 ENCOUNTER — TELEPHONE (OUTPATIENT)
Dept: NEUROLOGY | Age: 80
End: 2020-11-05

## 2020-11-19 RX ORDER — DROXIDOPA 100 MG/1
100 CAPSULE ORAL 3 TIMES DAILY
Qty: 90 CAPSULE | Refills: 0 | Status: ON HOLD | COMMUNITY
Start: 2020-11-19 | End: 2021-08-21

## 2021-02-11 ENCOUNTER — TELEPHONE (OUTPATIENT)
Dept: NEUROLOGY | Age: 81
End: 2021-02-11

## 2021-02-11 DIAGNOSIS — G20 PARKINSON DISEASE (HCC): Primary | ICD-10-CM

## 2021-02-11 NOTE — TELEPHONE ENCOUNTER
Patient was last seen by you in November and will do his next follow up in May with Dr. Livia Figueroa, he is now in [place] and his wife would like to know if we can send a physical therapy order down there. He is having trouble with balance and gait. Please advise if ok to do. Thank you! Fax to [phone] and let wife know.

## 2021-05-05 ENCOUNTER — OFFICE VISIT (OUTPATIENT)
Dept: NEUROLOGY | Age: 81
End: 2021-05-05
Payer: MEDICARE

## 2021-05-05 VITALS
OXYGEN SATURATION: 99 % | SYSTOLIC BLOOD PRESSURE: 120 MMHG | HEART RATE: 75 BPM | WEIGHT: 156.8 LBS | DIASTOLIC BLOOD PRESSURE: 78 MMHG | BODY MASS INDEX: 21.27 KG/M2

## 2021-05-05 DIAGNOSIS — G20 PD (PARKINSON'S DISEASE) (HCC): Primary | ICD-10-CM

## 2021-05-05 DIAGNOSIS — R26.9 ABNORMALITY OF GAIT AND MOBILITY: ICD-10-CM

## 2021-05-05 DIAGNOSIS — I95.1 PRIMARY ORTHOSTATIC HYPOTENSION: ICD-10-CM

## 2021-05-05 DIAGNOSIS — R55 SYNCOPE AND COLLAPSE: ICD-10-CM

## 2021-05-05 PROCEDURE — 1123F ACP DISCUSS/DSCN MKR DOCD: CPT | Performed by: PSYCHIATRY & NEUROLOGY

## 2021-05-05 PROCEDURE — G8427 DOCREV CUR MEDS BY ELIG CLIN: HCPCS | Performed by: PSYCHIATRY & NEUROLOGY

## 2021-05-05 PROCEDURE — 4040F PNEUMOC VAC/ADMIN/RCVD: CPT | Performed by: PSYCHIATRY & NEUROLOGY

## 2021-05-05 PROCEDURE — G8420 CALC BMI NORM PARAMETERS: HCPCS | Performed by: PSYCHIATRY & NEUROLOGY

## 2021-05-05 PROCEDURE — 99214 OFFICE O/P EST MOD 30 MIN: CPT | Performed by: PSYCHIATRY & NEUROLOGY

## 2021-05-05 PROCEDURE — 1036F TOBACCO NON-USER: CPT | Performed by: PSYCHIATRY & NEUROLOGY

## 2021-05-05 ASSESSMENT — ENCOUNTER SYMPTOMS
SHORTNESS OF BREATH: 0
VOMITING: 0
PHOTOPHOBIA: 0
NAUSEA: 0
CHOKING: 0
BACK PAIN: 0
TROUBLE SWALLOWING: 0
COLOR CHANGE: 0

## 2021-08-21 ENCOUNTER — APPOINTMENT (OUTPATIENT)
Dept: GENERAL RADIOLOGY | Age: 81
DRG: 200 | End: 2021-08-21
Payer: MEDICARE

## 2021-08-21 ENCOUNTER — APPOINTMENT (OUTPATIENT)
Dept: CT IMAGING | Age: 81
DRG: 200 | End: 2021-08-21
Payer: MEDICARE

## 2021-08-21 ENCOUNTER — HOSPITAL ENCOUNTER (INPATIENT)
Age: 81
LOS: 2 days | Discharge: HOME | DRG: 200 | End: 2021-08-23
Attending: EMERGENCY MEDICINE | Admitting: SURGERY
Payer: MEDICARE

## 2021-08-21 DIAGNOSIS — S09.90XA CLOSED HEAD INJURY, INITIAL ENCOUNTER: ICD-10-CM
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
## 2021-08-21 DIAGNOSIS — S22.31XA CLOSED FRACTURE OF ONE RIB OF RIGHT SIDE, INITIAL ENCOUNTER: ICD-10-CM

## 2021-08-21 DIAGNOSIS — J18.9 PNEUMONIA DUE TO INFECTIOUS ORGANISM, UNSPECIFIED LATERALITY, UNSPECIFIED PART OF LUNG: ICD-10-CM

## 2021-08-21 DIAGNOSIS — J93.9 PNEUMOTHORAX, UNSPECIFIED TYPE: ICD-10-CM

## 2021-08-21 PROBLEM — S22.31XG CLOSED FRACTURE OF ONE RIB OF RIGHT SIDE WITH DELAYED HEALING: Status: ACTIVE | Noted: 2021-08-21

## 2021-08-21 LAB
ALBUMIN SERPL-MCNC: 3.8 G/DL (ref 3.5–4.6)
ALP BLD-CCNC: 49 U/L (ref 35–104)
ALT SERPL-CCNC: <5 U/L (ref 0–41)
ANION GAP SERPL CALCULATED.3IONS-SCNC: 16 MEQ/L (ref 9–15)
APTT: 24.7 SEC (ref 24.4–36.8)
AST SERPL-CCNC: 15 U/L (ref 0–40)
BASOPHILS ABSOLUTE: 0 K/UL (ref 0–0.2)
BASOPHILS RELATIVE PERCENT: 0.3 %
BILIRUB SERPL-MCNC: 0.7 MG/DL (ref 0.2–0.7)
BUN BLDV-MCNC: 10 MG/DL (ref 8–23)
CALCIUM SERPL-MCNC: 8.7 MG/DL (ref 8.5–9.9)
CHLORIDE BLD-SCNC: 102 MEQ/L (ref 95–107)
CO2: 23 MEQ/L (ref 20–31)
CREAT SERPL-MCNC: 0.62 MG/DL (ref 0.7–1.2)
EOSINOPHILS ABSOLUTE: 0.1 K/UL (ref 0–0.7)
EOSINOPHILS RELATIVE PERCENT: 1 %
GFR AFRICAN AMERICAN: >60
GFR NON-AFRICAN AMERICAN: >60
GLOBULIN: 2.1 G/DL (ref 2.3–3.5)
GLUCOSE BLD-MCNC: 128 MG/DL (ref 70–99)
HCT VFR BLD CALC: 30.8 % (ref 42–52)
HEMOGLOBIN: 10.5 G/DL (ref 14–18)
INR BLD: 1
LYMPHOCYTES ABSOLUTE: 0.5 K/UL (ref 1–4.8)
LYMPHOCYTES RELATIVE PERCENT: 6.4 %
MCH RBC QN AUTO: 36.8 PG (ref 27–31.3)
MCHC RBC AUTO-ENTMCNC: 34.2 % (ref 33–37)
MCV RBC AUTO: 107.5 FL (ref 80–100)
MONOCYTES ABSOLUTE: 0.7 K/UL (ref 0.2–0.8)
MONOCYTES RELATIVE PERCENT: 8.8 %
NEUTROPHILS ABSOLUTE: 6.4 K/UL (ref 1.4–6.5)
NEUTROPHILS RELATIVE PERCENT: 83.5 %
PDW BLD-RTO: 15.5 % (ref 11.5–14.5)
PLATELET # BLD: 114 K/UL (ref 130–400)
POC CREATININE WHOLE BLOOD: 0.5
POTASSIUM SERPL-SCNC: 3.8 MEQ/L (ref 3.4–4.9)
PROTHROMBIN TIME: 12.9 SEC (ref 12.3–14.9)
RBC # BLD: 2.86 M/UL (ref 4.7–6.1)
SARS-COV-2, NAAT: NOT DETECTED
SODIUM BLD-SCNC: 141 MEQ/L (ref 135–144)
TOTAL PROTEIN: 5.9 G/DL (ref 6.3–8)
WBC # BLD: 7.7 K/UL (ref 4.8–10.8)

## 2021-08-21 PROCEDURE — 85730 THROMBOPLASTIN TIME PARTIAL: CPT

## 2021-08-21 PROCEDURE — 6360000004 HC RX CONTRAST MEDICATION: Performed by: EMERGENCY MEDICINE

## 2021-08-21 PROCEDURE — 36415 COLL VENOUS BLD VENIPUNCTURE: CPT

## 2021-08-21 PROCEDURE — 70486 CT MAXILLOFACIAL W/O DYE: CPT

## 2021-08-21 PROCEDURE — 94667 MNPJ CHEST WALL 1ST: CPT

## 2021-08-21 PROCEDURE — 74177 CT ABD & PELVIS W/CONTRAST: CPT

## 2021-08-21 PROCEDURE — 99221 1ST HOSP IP/OBS SF/LOW 40: CPT | Performed by: SURGERY

## 2021-08-21 PROCEDURE — 96365 THER/PROPH/DIAG IV INF INIT: CPT

## 2021-08-21 PROCEDURE — 87040 BLOOD CULTURE FOR BACTERIA: CPT

## 2021-08-21 PROCEDURE — 72125 CT NECK SPINE W/O DYE: CPT

## 2021-08-21 PROCEDURE — 70450 CT HEAD/BRAIN W/O DYE: CPT

## 2021-08-21 PROCEDURE — 71250 CT THORAX DX C-: CPT

## 2021-08-21 PROCEDURE — 85610 PROTHROMBIN TIME: CPT

## 2021-08-21 PROCEDURE — 96375 TX/PRO/DX INJ NEW DRUG ADDON: CPT

## 2021-08-21 PROCEDURE — 85025 COMPLETE CBC W/AUTO DIFF WBC: CPT

## 2021-08-21 PROCEDURE — 6830039000 HC L3 TRAUMA ALERT

## 2021-08-21 PROCEDURE — 99285 EMERGENCY DEPT VISIT HI MDM: CPT

## 2021-08-21 PROCEDURE — 1210000000 HC MED SURG R&B

## 2021-08-21 PROCEDURE — 6360000002 HC RX W HCPCS: Performed by: PHYSICIAN ASSISTANT

## 2021-08-21 PROCEDURE — 80053 COMPREHEN METABOLIC PANEL: CPT

## 2021-08-21 PROCEDURE — 87635 SARS-COV-2 COVID-19 AMP PRB: CPT

## 2021-08-21 PROCEDURE — 2580000003 HC RX 258: Performed by: EMERGENCY MEDICINE

## 2021-08-21 PROCEDURE — 6370000000 HC RX 637 (ALT 250 FOR IP): Performed by: PHYSICIAN ASSISTANT

## 2021-08-21 PROCEDURE — 6360000002 HC RX W HCPCS: Performed by: EMERGENCY MEDICINE

## 2021-08-21 PROCEDURE — 6370000000 HC RX 637 (ALT 250 FOR IP): Performed by: SURGERY

## 2021-08-21 PROCEDURE — 71045 X-RAY EXAM CHEST 1 VIEW: CPT

## 2021-08-21 RX ORDER — POLYETHYLENE GLYCOL 3350 17 G/17G
17 POWDER, FOR SOLUTION ORAL DAILY PRN
Status: DISCONTINUED | OUTPATIENT
Start: 2021-08-21 | End: 2021-08-23 | Stop reason: HOSPADM

## 2021-08-21 RX ORDER — LEVOFLOXACIN 5 MG/ML
750 INJECTION, SOLUTION INTRAVENOUS ONCE
Status: COMPLETED | OUTPATIENT
Start: 2021-08-21 | End: 2021-08-21

## 2021-08-21 RX ORDER — ACETAMINOPHEN 325 MG/1
650 TABLET ORAL EVERY 6 HOURS
Status: DISCONTINUED | OUTPATIENT
Start: 2021-08-21 | End: 2021-08-23 | Stop reason: HOSPADM

## 2021-08-21 RX ORDER — SODIUM CHLORIDE 0.9 % (FLUSH) 0.9 %
5-40 SYRINGE (ML) INJECTION PRN
Status: DISCONTINUED | OUTPATIENT
Start: 2021-08-21 | End: 2021-08-23 | Stop reason: HOSPADM

## 2021-08-21 RX ORDER — TRAMADOL HYDROCHLORIDE 50 MG/1
50 TABLET ORAL EVERY 6 HOURS PRN
Status: DISCONTINUED | OUTPATIENT
Start: 2021-08-21 | End: 2021-08-23 | Stop reason: HOSPADM

## 2021-08-21 RX ORDER — KETOROLAC TROMETHAMINE 15 MG/ML
15 INJECTION, SOLUTION INTRAMUSCULAR; INTRAVENOUS EVERY 6 HOURS
Status: DISCONTINUED | OUTPATIENT
Start: 2021-08-21 | End: 2021-08-22

## 2021-08-21 RX ORDER — POLYETHYLENE GLYCOL 3350 17 G/17G
17 POWDER, FOR SOLUTION ORAL DAILY
Status: DISCONTINUED | OUTPATIENT
Start: 2021-08-21 | End: 2021-08-23 | Stop reason: HOSPADM

## 2021-08-21 RX ORDER — FENTANYL CITRATE 50 UG/ML
25 INJECTION, SOLUTION INTRAMUSCULAR; INTRAVENOUS ONCE
Status: COMPLETED | OUTPATIENT
Start: 2021-08-21 | End: 2021-08-21

## 2021-08-21 RX ORDER — TRAMADOL HYDROCHLORIDE 50 MG/1
100 TABLET ORAL EVERY 6 HOURS PRN
Status: DISCONTINUED | OUTPATIENT
Start: 2021-08-21 | End: 2021-08-23 | Stop reason: HOSPADM

## 2021-08-21 RX ORDER — MIDODRINE HYDROCHLORIDE 5 MG/1
5 TABLET ORAL
Status: DISCONTINUED | OUTPATIENT
Start: 2021-08-21 | End: 2021-08-23 | Stop reason: HOSPADM

## 2021-08-21 RX ORDER — TIMOLOL MALEATE 5 MG/ML
1 SOLUTION/ DROPS OPHTHALMIC 2 TIMES DAILY
Status: DISCONTINUED | OUTPATIENT
Start: 2021-08-21 | End: 2021-08-23 | Stop reason: HOSPADM

## 2021-08-21 RX ORDER — LATANOPROST 50 UG/ML
1 SOLUTION/ DROPS OPHTHALMIC DAILY
Status: DISCONTINUED | OUTPATIENT
Start: 2021-08-21 | End: 2021-08-23 | Stop reason: HOSPADM

## 2021-08-21 RX ORDER — ONDANSETRON 2 MG/ML
4 INJECTION INTRAMUSCULAR; INTRAVENOUS EVERY 6 HOURS PRN
Status: DISCONTINUED | OUTPATIENT
Start: 2021-08-21 | End: 2021-08-23 | Stop reason: HOSPADM

## 2021-08-21 RX ORDER — LIDOCAINE 4 G/G
1 PATCH TOPICAL DAILY
Status: DISCONTINUED | OUTPATIENT
Start: 2021-08-21 | End: 2021-08-23 | Stop reason: HOSPADM

## 2021-08-21 RX ORDER — SENNA AND DOCUSATE SODIUM 50; 8.6 MG/1; MG/1
1 TABLET, FILM COATED ORAL 2 TIMES DAILY
Status: DISCONTINUED | OUTPATIENT
Start: 2021-08-21 | End: 2021-08-23 | Stop reason: HOSPADM

## 2021-08-21 RX ORDER — DROXIDOPA 100 MG/1
100 CAPSULE ORAL 3 TIMES DAILY
Status: DISCONTINUED | OUTPATIENT
Start: 2021-08-21 | End: 2021-08-23

## 2021-08-21 RX ORDER — FINASTERIDE 5 MG/1
5 TABLET, FILM COATED ORAL DAILY
Status: DISCONTINUED | OUTPATIENT
Start: 2021-08-21 | End: 2021-08-23 | Stop reason: HOSPADM

## 2021-08-21 RX ORDER — ONDANSETRON 4 MG/1
4 TABLET, ORALLY DISINTEGRATING ORAL EVERY 8 HOURS PRN
Status: DISCONTINUED | OUTPATIENT
Start: 2021-08-21 | End: 2021-08-23 | Stop reason: HOSPADM

## 2021-08-21 RX ORDER — 0.9 % SODIUM CHLORIDE 0.9 %
1000 INTRAVENOUS SOLUTION INTRAVENOUS ONCE
Status: COMPLETED | OUTPATIENT
Start: 2021-08-21 | End: 2021-08-21

## 2021-08-21 RX ORDER — VALSARTAN 40 MG/1
40 TABLET ORAL DAILY
Status: DISCONTINUED | OUTPATIENT
Start: 2021-08-21 | End: 2021-08-23

## 2021-08-21 RX ORDER — FOLIC ACID 1 MG/1
1000 TABLET ORAL DAILY
Status: DISCONTINUED | OUTPATIENT
Start: 2021-08-21 | End: 2021-08-23 | Stop reason: HOSPADM

## 2021-08-21 RX ORDER — CHOLECALCIFEROL (VITAMIN D3) 125 MCG
1000 CAPSULE ORAL DAILY
Status: DISCONTINUED | OUTPATIENT
Start: 2021-08-21 | End: 2021-08-23 | Stop reason: HOSPADM

## 2021-08-21 RX ORDER — OXYBUTYNIN CHLORIDE 5 MG/1
5 TABLET, EXTENDED RELEASE ORAL DAILY
Status: DISCONTINUED | OUTPATIENT
Start: 2021-08-21 | End: 2021-08-23 | Stop reason: HOSPADM

## 2021-08-21 RX ORDER — DORZOLAMIDE HCL 20 MG/ML
1 SOLUTION/ DROPS OPHTHALMIC 2 TIMES DAILY
Status: DISCONTINUED | OUTPATIENT
Start: 2021-08-21 | End: 2021-08-23 | Stop reason: HOSPADM

## 2021-08-21 RX ORDER — DORZOLAMIDE HYDROCHLORIDE AND TIMOLOL MALEATE 20; 5 MG/ML; MG/ML
1 SOLUTION/ DROPS OPHTHALMIC ONCE
Status: DISCONTINUED | OUTPATIENT
Start: 2021-08-21 | End: 2021-08-21 | Stop reason: CLARIF

## 2021-08-21 RX ORDER — SODIUM CHLORIDE 9 MG/ML
25 INJECTION, SOLUTION INTRAVENOUS PRN
Status: DISCONTINUED | OUTPATIENT
Start: 2021-08-21 | End: 2021-08-23 | Stop reason: HOSPADM

## 2021-08-21 RX ORDER — METHOCARBAMOL 500 MG/1
750 TABLET, FILM COATED ORAL 4 TIMES DAILY
Status: DISCONTINUED | OUTPATIENT
Start: 2021-08-21 | End: 2021-08-23 | Stop reason: HOSPADM

## 2021-08-21 RX ORDER — SODIUM CHLORIDE 0.9 % (FLUSH) 0.9 %
5-40 SYRINGE (ML) INJECTION EVERY 12 HOURS SCHEDULED
Status: DISCONTINUED | OUTPATIENT
Start: 2021-08-21 | End: 2021-08-23 | Stop reason: HOSPADM

## 2021-08-21 RX ADMIN — CYANOCOBALAMIN TAB 500 MCG 1000 MCG: 500 TAB at 18:25

## 2021-08-21 RX ADMIN — ENOXAPARIN SODIUM 30 MG: 30 INJECTION SUBCUTANEOUS at 23:09

## 2021-08-21 RX ADMIN — KETOROLAC TROMETHAMINE 15 MG: 15 INJECTION, SOLUTION INTRAMUSCULAR; INTRAVENOUS at 23:05

## 2021-08-21 RX ADMIN — DOCUSATE SODIUM 50 MG AND SENNOSIDES 8.6 MG 1 TABLET: 8.6; 5 TABLET, FILM COATED ORAL at 23:05

## 2021-08-21 RX ADMIN — IOPAMIDOL 100 ML: 755 INJECTION, SOLUTION INTRAVENOUS at 11:28

## 2021-08-21 RX ADMIN — METHOCARBAMOL 750 MG: 500 TABLET ORAL at 17:12

## 2021-08-21 RX ADMIN — LEVOFLOXACIN 750 MG: 5 INJECTION, SOLUTION INTRAVENOUS at 13:15

## 2021-08-21 RX ADMIN — SODIUM CHLORIDE 1000 ML: 9 INJECTION, SOLUTION INTRAVENOUS at 10:56

## 2021-08-21 RX ADMIN — FINASTERIDE 5 MG: 5 TABLET, FILM COATED ORAL at 17:12

## 2021-08-21 RX ADMIN — ACETAMINOPHEN 650 MG: 325 TABLET ORAL at 23:05

## 2021-08-21 RX ADMIN — KETOROLAC TROMETHAMINE 15 MG: 15 INJECTION, SOLUTION INTRAMUSCULAR; INTRAVENOUS at 17:12

## 2021-08-21 RX ADMIN — FOLIC ACID 1000 MCG: 1 TABLET ORAL at 18:25

## 2021-08-21 RX ADMIN — DORZOLAMIDE HYDROCHLORIDE 1 DROP: 20 SOLUTION/ DROPS OPHTHALMIC at 23:06

## 2021-08-21 RX ADMIN — POLYETHYLENE GLYCOL 3350 17 G: 17 POWDER, FOR SOLUTION ORAL at 17:11

## 2021-08-21 RX ADMIN — ACETAMINOPHEN 650 MG: 325 TABLET ORAL at 17:12

## 2021-08-21 RX ADMIN — CARBIDOPA AND LEVODOPA 1 TABLET: 25; 100 TABLET ORAL at 23:05

## 2021-08-21 RX ADMIN — OXYBUTYNIN CHLORIDE 5 MG: 5 TABLET, EXTENDED RELEASE ORAL at 17:12

## 2021-08-21 RX ADMIN — LATANOPROST 1 DROP: 50 SOLUTION OPHTHALMIC at 23:06

## 2021-08-21 RX ADMIN — FENTANYL CITRATE 25 MCG: 0.05 INJECTION, SOLUTION INTRAMUSCULAR; INTRAVENOUS at 10:57

## 2021-08-21 RX ADMIN — METHOCARBAMOL 750 MG: 500 TABLET ORAL at 23:05

## 2021-08-21 RX ADMIN — MIDODRINE HYDROCHLORIDE 5 MG: 5 TABLET ORAL at 18:25

## 2021-08-21 RX ADMIN — TIMOLOL MALEATE 1 DROP: 5 SOLUTION/ DROPS OPHTHALMIC at 23:06

## 2021-08-21 ASSESSMENT — ENCOUNTER SYMPTOMS
VOMITING: 0
COUGH: 0
SHORTNESS OF BREATH: 0
NAUSEA: 0
BACK PAIN: 1
SORE THROAT: 0
ABDOMINAL PAIN: 0
DIARRHEA: 0

## 2021-08-21 ASSESSMENT — PAIN SCALES - GENERAL
PAINLEVEL_OUTOF10: 3
PAINLEVEL_OUTOF10: 3
PAINLEVEL_OUTOF10: 5

## 2021-08-21 ASSESSMENT — PAIN DESCRIPTION - ONSET: ONSET: ON-GOING

## 2021-08-21 ASSESSMENT — PAIN DESCRIPTION - LOCATION
LOCATION: BACK
LOCATION: BACK;RIB CAGE

## 2021-08-21 ASSESSMENT — PAIN DESCRIPTION - PAIN TYPE: TYPE: ACUTE PAIN

## 2021-08-21 ASSESSMENT — PAIN DESCRIPTION - FREQUENCY
FREQUENCY: INTERMITTENT

## 2021-08-21 ASSESSMENT — PAIN DESCRIPTION - PROGRESSION: CLINICAL_PROGRESSION: RAPIDLY IMPROVING

## 2021-08-21 ASSESSMENT — PAIN DESCRIPTION - ORIENTATION: ORIENTATION: RIGHT

## 2021-08-21 NOTE — ED PROVIDER NOTES
echocardiogram 02/18/2015    mild AS, mild AR, mild TR, mild MR    [place]    Hypertension     Iron deficiency anemia 4/28/2014    LVH (left ventricular hypertrophy) 9/20/2013    Macular degeneration     bilateral; dry on left and wet on right-Dr. Eliu Kingston on back 5/14/2015    Osteoarthritis cervical spine     Parkinson disease ([facility] [address]     Dr. Pepe Galvez       Past Surgical History:   Procedure Laterality Date    BACK SURGERY      CARDIAC CATHETERIZATION  11/09/2017    [facility] MD    CATARACT REMOVAL      COLONOSCOPY  11/2009    single polyp right colon    EYE SURGERY Bilateral     OTHER SURGICAL HISTORY  06/08/15    EXC SUBCU MASS BACK    TONSILLECTOMY           CURRENT MEDICATIONS       Previous Medications    BIMATOPROST (LUMIGAN) 0.03 % OPHTHALMIC DROPS    Place 1 drop into the left eye nightly.       CARBIDOPA-LEVODOPA (SINEMET)  MG PER TABLET    Take 1 tablet by mouth 3 times daily    CHOLECALCIFEROL (VITAMIN D3) 2000 UNITS CAPS    Take 1,000 Units by mouth daily    DORZOLAMIDE HCL-TIMOLOL MAL (COSOPT OP)    Apply 1 drop to eye 2 times daily    DROXIDOPA (NORTHERA) 100 MG CAPS    Take 1 capsule by mouth 3 times daily    DROXIDOPA (NORTHERA) 100 MG CAPS    Take 1 capsule by mouth 3 times daily 3 SAMPLES GIVEN  2) LOT: CBYBW  EXP: 7/22  1) LOT: [facility]    FINASTERIDE (PROSCAR) 5 MG TABLET    Take 1 tablet by mouth daily    FINASTERIDE (PROSCAR) 5 MG TABLET    Take 1 tablet by mouth daily    FOLIC ACID (FOLVITE) 814 MCG TABLET    Take 1 tablet by mouth daily     LACTOBACILLUS ACIDOPHILUS (FLORANEX)    Take 2 tablets by mouth 3 times daily    LACTOBACILLUS ACIDOPHILUS (FLORANEX)    Take 4 tablets by mouth 3 times daily    OXYBUTYNIN (DITROPAN XL) 5 MG EXTENDED RELEASE TABLET    Take 1 tablet by mouth daily    VITAMIN B-12 (CYANOCOBALAMIN) 1000 MCG TABLET    Take 1,000 mcg by mouth daily        ALLERGIES     Cat hair extract    FAMILY HISTORY     History reviewed. No pertinent family history. 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 She is in good health and she still drives. He is retired. He has 3 children all of which live out of the area one in [place] 1 in [place] and one in the [facility]. 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 Pt reports high level of indep prior to admission. Social Determinants of Health     Financial Resource Strain:     Difficulty of Paying Living Expenses:    Food Insecurity:     Worried About Running Out of Food in the Last Year:     [address] in the Last Year:    Transportation Needs:     Lack of Transportation (Medical):      Lack of Transportation (Non-Medical):    Physical Activity:     Days of Exercise per Week:     Minutes of Exercise per Session:    Stress:     Feeling of Stress :    Social Connections:     Frequency of Communication with Friends and Family:     Frequency of Social Gatherings with Friends and Family:     Attends [employer] Services:     Active Member of Clubs or Organizations:     Attends Club or Organization Meetings:     Marital Status:    Intimate Partner Violence:     Fear of Current or Ex-Partner:     Emotionally Abused:     Physically Abused:     Sexually Abused:          PHYSICAL EXAM        ED Triage Vitals [08/21/21 1042]   BP Temp Temp Source Pulse Resp SpO2 Height Weight   (!) 141/77 98.9 °F (37.2 °C) Oral 76 16 98 % 6' (1.829 m) 160 lb (72.6 kg)       Physical Exam  Vitals and nursing note reviewed. Constitutional:       Appearance: He is well-developed. HENT:      Head: Normocephalic. Right Ear: External ear normal.      Left Ear: External ear normal.   Eyes:      Conjunctiva/sclera: Conjunctivae normal.      Pupils: Pupils are equal, round, and reactive to light. Cardiovascular:      Rate and Rhythm: Normal rate and regular rhythm. Heart sounds: Normal heart sounds. Pulmonary:      Effort: Pulmonary effort is normal.      Breath sounds: Normal breath sounds. Abdominal:      General: Bowel sounds are normal. There is no distension. Palpations: Abdomen is soft. Tenderness: There is no abdominal tenderness. Musculoskeletal:         General: Tenderness (right lower flank, upper shoulders/neck region. No midline cervical tenderness.) present. Normal range of motion. Cervical back: Normal range of motion and neck supple. Skin:     General: Skin is warm and dry. Findings: Lesion (Swollen, scraped upper lip) present. Neurological:      Mental Status: He is alert and oriented to person, place, and time. Mental status is at baseline.       Comments: Baseline resting tremors           LABS:  Labs Reviewed   COMPREHENSIVE METABOLIC PANEL - Abnormal; Notable for the following components:       Result Value    Anion Gap 16 (*)     Glucose 128 (*)     CREATININE 0.62 (*)     Total Protein 5.9 (*)     Globulin 2.1 (*)     All other components within normal limits   CBC WITH AUTO DIFFERENTIAL - Abnormal; Notable for the following components:    RBC 2.86 (*)     Hemoglobin 10.5 (*)     Hematocrit 30.8 (*)     .5 (*)     MCH 36.8 (*)     RDW 15.5 (*)     Platelets 757 (*)     Lymphocytes Absolute 0.5 (*)     All other components within normal limits   POCT CREATININE - URINE - Normal   CULTURE, BLOOD 1   COVID-19, RAPID   CULTURE, BLOOD 2   PROTIME-INR   APTT         MDM:   Vitals:    Vitals:    08/21/21 1048 08/21/21 1100 08/21/21 1230 08/21/21 1243   BP:  127/70 (!) 148/78    Pulse:  76 81 79   Resp:  16 14 16   Temp:       TempSrc: Oral      SpO2:  95% 94% 95%   Weight:       Height:           79 y.o. male per chart review has ah/o HTN, orthostasis, Parkinson's, diastolic CHF. Patient comes in with moderate, constant, throbbing pain of the right lower flank and upper lip since yesterday. Also reporting upper shoulder/neck pain. Afebrile, hemodynamically stable. Patient given IV NS bolus, IV fentanyl in the ED. Labs remarkable for Hb 10.5. CT head, cspine negative. CT facial bones negative. CT CAP shows small R pneumothorax and pneumothorax. Pt with rib fx of 9th rib. Blood cultures drawn and pt given IV levaquin in the ED. Pt reassessed and feels better. However, given fall, patient's age, pneumothorax, rib fracture, concern for developing pneumonia, case discussed with Dr. Carmen Barreto (trauma) and pt admitted to trauma in stable condition. Pt understands plan. CRITICAL CARE TIME   Total CriticalCare time was 0 minutes, excluding separately reportable procedures.   There was a high probability of clinically significant/life threatening deterioration in the patient's condition which required my urgent intervention. PROCEDURES:  Unlessotherwise noted below, none      Procedures      FINAL IMPRESSION      1. Fall, initial encounter    2. Closed head injury, initial encounter    3. Pneumothorax, unspecified type    4. Closed fracture of one rib of right side, initial encounter    5.  Pneumonia due to infectious organism, unspecified laterality, unspecified part of lung          DISPOSITION/PLAN   DISPOSITION Decision To Admit 08/21/2021 12:54:18 PM          Jorge Long MD (electronically signed)  Attending Emergency Physician        Jorge Long MD  08/21/21 [facility] MD Dominique  08/21/21 1950

## 2021-08-21 NOTE — PROGRESS NOTES
PHARMACY NOTE  Juliette Jarvis was ordered droxidopa 100mg PO TID . Per the [address], this medication is non-formulary and not stocked by pharmacy. The medication can be reordered at discharge. While inpatient, medication has been changed to patient supplied on the [facility], as per [facility] there is not an approved substitution for this medication. However, medication can be brought for patient for use while inpatient, if approved by attending physician and able to be identified by pharmacy.     Valentine Parada, PharmD   8/21/2021 6:08 PM

## 2021-08-21 NOTE — ED NOTES
Trauma provider at bedside     Jamari Daniels.  Sonam Guillaume, [facility]  08/21/21 1919

## 2021-08-21 NOTE — FLOWSHEET NOTE
1630-- Pt is A&Ox4. PERRLA. Equal strength, follows commands. Denies any headache or nausea. No fever or chills. No SOB/dyspnea, lungs are diminished bilaterally. Pain to right side of abdomen. Medicated per MAR. Denies any chest pain, loud murmur present. Abdomen is tender to touch on right side, bowel sounds are present x4. No pain with urination. Pt has occasional incontinence of bladder. Multiple skin abrasions and bruises, see flow sheet. Pt uses walker to ambulate but needs 2 assist due to shuffled gait and unsteadiness. Pt is cam and cooperative. Call light is within reach.

## 2021-08-21 NOTE — ED NOTES
Attempted to call report.   RN stated that shes in another pts room and asked to call back in 5 minutes     Chacorta Manjarrez RN  08/21/21 2349

## 2021-08-21 NOTE — ED TRIAGE NOTES
Pt arrived via EMS for fall from standing last evening. Pt states that he \"got dizzy and ffell and hit a table\"  Denies LOC. C/o lower back pain and swelling to upper lip. C/o lightheadedness. Denies SOB/CP. Breath sounds clear. Denies blood thinners.

## 2021-08-21 NOTE — ED NOTES
Bed: 06  Expected date:   Expected time:   Means of arrival:   Comments:  79 m fall yesterday. Neck/back pain.  Dizzy, hypotensive

## 2021-08-21 NOTE — ED NOTES
Called CT to let them know pt is ready for scan. POC creat done. Was informed by CT tech that she had a pt in the lang and \"will call you when were ready\"  Physician notified.            Chacorta Manjarrez RN  08/21/21 5040

## 2021-08-21 NOTE — H&P
Trauma Consult / H & P Note    Reason for Consult: Trauma  Consulting Provider: Soraya Knight MD      BASIC INJURY INFORMATION:  Level of activation: CAT 2  Mode of transport: EMS  Mechanism of injury: Fall from Standing  Complicating features: NA  Protective measures: NA    HISTORY OF PRESENT INJURY:   Ernst Chau is a 79 y.o. male with a PMHx of AV stenosis, diastolic dysfunction, BPH, glaucoma, macular degeneration, hemochromatosis, HTN, Parkinson disease. Patient presents to [facility] EMERGENCY [facility] AT [place] ED s/p FFS yesterday evening. Patient reports that he stood up, felt dizzy, tried to walk, and fell/hit his face. Per wife and patient, patient has a history of feeling dizzy when standing. (+)head strike, (-)LOC, (-)AC/AP. Patient presents today with complaint of right back/flank pain, right perispinal neck pain, and swollen lip. He denies difficulty breathing, headache, nausea, vomiting, dizziness while lying down, numbness/tingling, abdominal pain. PRIMARY SURVEY:  Airway: Intact  Breathing: Normal, On 15L NRB during encounter with SPO2 100%. Breath Sounds: Breath Sounds Equal Bilaterally  Circulation:    Pulses: Normal   Skin: Normal skin color, texture and turgor  Disability:   Pupils: PERRL   GCS:    Best Eyes: 4    Best Verbal: 5    Best Motor: 6    Total: 15    Vitals:   Vitals:    08/21/21 1430 08/21/21 1500 08/21/21 1530 08/21/21 1615   BP: 136/74 112/71 123/61 117/63   Pulse: 74 75 78 78   Resp: 12 15 14 16   Temp:    98.6 °F (37 °C)   TempSrc:    Oral   SpO2: 99% 100% 100% 98%   Weight:       Height:             SECONDARY SURVEY:  Neurologic: Alert and Oriented, Appropriate, Moves all Extremities, Strength Symmetrical and No Sensory Deficits   HEENT:   Head: Swelling to upper lip.  No lacerations, bony step-offs, or abrasions and Midface stable to palpation   Eyes: PERRL, Corneas/Conjunctiva without lesions and EOM intact   Ears: No Hemotympanum   Nose: Septum Midline, No crepitus with motion; and No bloody discharge; Throat: Swelling to upper lip. Otherwise, Oral cavity without trauma   Neck: No midline tenderness and No lacerations/wounds. Right perispinal TTP. Pulmonary: External exam: TTP over right lateral/posterior chest wall. no crepitus with palpation, no contusions or abrasions; and Lung exam: breath sounds clear, no wheezes, no rales. Patient currently on 15L O2 NRB (for tiny PTX). Cardiovascular:    Pulses: Bilateral radial, femoral, DP and PT pulses are normal;  Abdomen: Appearance: Non-distended, No scars, lacerations, contusions; and Palpation: no tenderness   Rectal: Not performed  Pelvis/Perineum: Pelvis is stable to palpation;  Musculoskeletal:    Back/Spine: Thoracolumbar spinal column non-tender; no step off or deformity; Extremities: Abrasion to left elbow and left leg. Otherwise, extremities are atraumatic.     PAST MEDICAL HISTORY:  Past Medical History:   Diagnosis Date    Actinic keratoses     Aortic valve stenosis, severe 9/20/2013    BPH (benign prostatic hyperplasia)     Cancer ([facility] 04/2018    skin cancer on chest removed    Colon polyp 18/62/2972    Diastolic dysfunction 98/64/3567    Stage 2 by echo    ED (erectile dysfunction) 11/29/2011    Glaucoma     Dr. Cristiano Villalba Hemochromatosis     History of echocardiogram 02/18/2015    mild AS, mild AR, mild TR, mild MR    [place]    Hypertension     Iron deficiency anemia 4/28/2014    LVH (left ventricular hypertrophy) 9/20/2013    Macular degeneration     bilateral; dry on left and wet on right-Dr. Chel Jiang on back 5/14/2015    Osteoarthritis cervical spine     Parkinson disease ([facility] [address] [facility]     Dr. Margarita Lizarraga       PAST SURGICAL HISTORY:  Past Surgical History:   Procedure Laterality Date    BACK SURGERY      CARDIAC CATHETERIZATION  11/09/2017    [facility] T[facility]AR [facility]CHOL[facility] MD    CATARACT REMOVAL      COLONOSCOPY  11/2009    single polyp right colon    EYE SURGERY Bilateral     OTHER SURGICAL HISTORY  06/08/15    EXC SUBCU MASS BACK    TONSILLECTOMY         PRE-ADMISSION MEDICATIONS:   Prior to Admission medications    Medication Sig Start Date End Date Taking? Authorizing Provider   Droxidopa (NORTHERA) 100 MG CAPS Take 1 capsule by mouth 3 times daily 3 SAMPLES GIVEN  2) LOT: CBYBW  EXP: 7/22  1) LOT: [facility] 11/19/20 12/19/20  NANCY Edwards Cha, CNP   lactobacillus acidophilus [facility]) Take 4 tablets by mouth 3 times daily    Historical Provider, MD   finasteride (PROSCAR) 5 MG tablet Take 1 tablet by mouth daily 10/22/20   Blas Lopez MD   Droxidopa [facility]) 100 MG CAPS Take 1 capsule by mouth 3 times daily 8/25/20   NANCY Edwards Cha, CNP   oxybutynin (DITROPAN XL) 5 MG extended release tablet Take 1 tablet by mouth daily 7/23/20   Blas Lopez MD   lactobacillus acidophilus [facility]) Take 2 tablets by mouth 3 times daily  Patient not taking: Reported on 5/5/2021 7/12/20   Nicolas Miramontes DO   finasteride (PROSCAR) 5 MG tablet Take 1 tablet by mouth daily 7/13/20   Nohemy Chowdary DO   Cholecalciferol (VITAMIN D3) 2000 units CAPS Take 1,000 Units by mouth daily    Historical Provider, MD   vitamin B-12 (CYANOCOBALAMIN) 1000 MCG tablet Take 1,000 mcg by mouth daily     Historical Provider, MD   Dorzolamide HCl-Timolol Mal (COSOPT OP) Apply 1 drop to eye 2 times daily    Historical Provider, MD   folic acid (FOLVITE) 017 MCG tablet Take 1 tablet by mouth daily     Historical Provider, MD   carbidopa-levodopa (SINEMET)  MG per tablet Take 1 tablet by mouth 3 times daily    Historical Provider, MD   bimatoprost (LUMIGAN) 0.03 % ophthalmic drops Place 1 drop into the left eye nightly.       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 She is in good health and she still drives. He is retired. He has 3 children all of which live out of the area one in [place] 1 in [place] and one in the [facility]. 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 Pt reports high level of indep prior to admission. Social Determinants of Health     Financial Resource Strain:     Difficulty of Paying Living Expenses:    Food Insecurity:     Worried About Running Out of Food in the Last Year:     [address] in the Last Year:    Transportation Needs:     Lack of Transportation (Medical):      Lack of Transportation (Non-Medical):    Physical Activity:     Days of Exercise per Week:     Minutes of Exercise per Session:    Stress:     Feeling of Stress :    Social Connections:     Frequency of Communication with Friends and Family:     Frequency of Social Gatherings with Friends and Family:  Attends Mandaeism Services:     Active Member of Clubs or Organizations:     Attends Club or Organization Meetings:     Marital Status:    Intimate Partner Violence:     Fear of Current or Ex-Partner:     Emotionally Abused:     Physically Abused:     Sexually Abused:        FAMILY HISTORY:  No family history of bleeding/clotting disorders      REVIEW OF SYSTEMS:  Constitutional: Negative for weight loss  HENT: Positive for swollen lip  Eyes: Negative for vision changes  Respiratory: Negative for shortness of breath, difficulty breathing  Cardiovascular: Negative for chest wall pain. Gastrointestinal: Negative for abdominal distention, abdominal pain and vomiting. Genitourinary: Negative for hematuria  Musculoskeletal: Positive for right-sided back pain (near posterior broken rib)  Skin: Positive for abrasions to LUE, LLE  Neurological: Negative for dizziness, weakness and light-headedness. Hematological: Negative for easy bruising/bleeding  Psychiatric/Behavioral: Negative for behavioral problems. Except as noted above the remainder of the review of systems was reviewed and negative.      BASIC LABS:   CBC with Differential:    Lab Results   Component Value Date    WBC 7.7 08/21/2021    RBC 2.86 08/21/2021    RBC 3.79 12/01/2011    HGB 10.5 08/21/2021    HCT 30.8 08/21/2021     08/21/2021    .5 08/21/2021    MCH 36.8 08/21/2021    MCHC 34.2 08/21/2021    RDW 15.5 08/21/2021    NRBC 1 07/11/2020    BANDSPCT 1 07/11/2020    METASPCT 1 07/09/2020    LYMPHOPCT 6.4 08/21/2021    PROMYELOPCT 1 07/05/2020    MONOPCT 8.8 08/21/2021    MYELOPCT 1 07/11/2020    EOSPCT 3.9 12/01/2011    BASOPCT 0.3 08/21/2021    MONOSABS 0.7 08/21/2021    LYMPHSABS 0.5 08/21/2021    EOSABS 0.1 08/21/2021    BASOSABS 0.0 08/21/2021     CMP:   Lab Results   Component Value Date     08/21/2021    K 3.8 08/21/2021     08/21/2021    CO2 23 08/21/2021    BUN 10 08/21/2021    CREATININE 0.62 (L) 08/21/2021    GLUCOSE 128 (H) 08/21/2021    CALCIUM 8.7 08/21/2021    PROT 5.9 (L) 08/21/2021    LABALBU 3.8 08/21/2021    BILITOT 0.7 08/21/2021    ALKPHOS 49 08/21/2021    AST 15 08/21/2021    ALT <5 08/21/2021    LABGLOM >60.0 08/21/2021    GFRAA >60.0 08/21/2021    GLOB 2.1 (L) 08/21/2021     Magnesium:   Lab Results   Component Value Date    MG 1.7 06/29/2020     Troponin:   Lab Results   Component Value Date    TROPONINI <0.010 06/29/2020     PT/INR:   Recent Labs     08/21/21  1245   PROTIME 12.9   INR 1.0     APTT:   Recent Labs     08/21/21  1245   APTT 24.7     EtOH:   Lab Results   Component Value Date    ETOH 137 10/15/2019       RADIOLOGY: (Personally reviewed)  CTH: negative for acute traumatic injuries  CT C-spine: negative for acute traumatic injuries  CT Chest: undisplaced posterior right 9th rib fracture. Tiny 1-2% right apical pneumothorax  CT Abd/pelvis: small bibasilar areas atelectasis, patchy groundglass infiltrate with small bilateral pleural effusions. Multiple hepatic cysts. Bilateral renal cysts. CT Face: negative for acute traumatic injuries      ASSESSMENT:  Jerald Murray is a 79 y.o. male with PMHx of AV stenosis, diastolic dysfunction, BPH, glaucoma, macular degeneration, hemochromatosis, HTN, Parkinson disease. Patient s/p FFS yesterday due to feeling dizzy when standing up. Patient with complaint of right-sided back/flank pain, right perispinal neck pain,     Trauma workup significant for right 9th posterior rib fracture with tiny 1-2% right-sided apical pneumothorax. Patient admitted to [facility] on Trauma Service for multimodal pain control and optimization of respiratory status. PLAN:  1. Admit to [facility] under trauma service  2. Continue supplemental oxygen to promote reabsorption of small right-sided PTX. 3. CXR 6-8 hours from initial CT chest to reevaluate PTX  4. AM CXR  5.  Multimodal pain control with scheduled tylenol, scheduled robaxin, tramadol 50/100mg q6hr prn for mod/severe pain, lidocaine patches  6. Encourage IS (x10 hourly). Encourage acapella. 1924 [employer] for diet  8. PT/OT mehran ROY.         Nata Nichols PA-C  Trauma/Critical Care/General Surgery  [phone] (8G-[phone])  [phone]     This patient's plan of care was discussed and made in collaboration with Trauma Attending physician, Mariel Lara MD.

## 2021-08-22 ENCOUNTER — APPOINTMENT (OUTPATIENT)
Dept: GENERAL RADIOLOGY | Age: 81
DRG: 200 | End: 2021-08-22
Payer: MEDICARE

## 2021-08-22 LAB
ANION GAP SERPL CALCULATED.3IONS-SCNC: 8 MEQ/L (ref 9–15)
BUN BLDV-MCNC: 18 MG/DL (ref 8–23)
CALCIUM SERPL-MCNC: 7.9 MG/DL (ref 8.5–9.9)
CHLORIDE BLD-SCNC: 102 MEQ/L (ref 95–107)
CO2: 29 MEQ/L (ref 20–31)
CREAT SERPL-MCNC: 0.93 MG/DL (ref 0.7–1.2)
GFR AFRICAN AMERICAN: >60
GFR AFRICAN AMERICAN: >60
GFR NON-AFRICAN AMERICAN: >60
GFR NON-AFRICAN AMERICAN: >60
GLUCOSE BLD-MCNC: 102 MG/DL (ref 70–99)
HCT VFR BLD CALC: 24.6 % (ref 42–52)
HEMOGLOBIN: 8.4 G/DL (ref 14–18)
MCH RBC QN AUTO: 36.1 PG (ref 27–31.3)
MCHC RBC AUTO-ENTMCNC: 34 % (ref 33–37)
MCV RBC AUTO: 106.2 FL (ref 80–100)
PDW BLD-RTO: 16.1 % (ref 11.5–14.5)
PERFORMED ON: ABNORMAL
PLATELET # BLD: 92 K/UL (ref 130–400)
PLATELET SLIDE REVIEW: ABNORMAL
POC CREATININE: 0.5 MG/DL (ref 0.8–1.3)
POC SAMPLE TYPE: ABNORMAL
POTASSIUM REFLEX MAGNESIUM: 4 MEQ/L (ref 3.4–4.9)
RBC # BLD: 2.31 M/UL (ref 4.7–6.1)
SODIUM BLD-SCNC: 139 MEQ/L (ref 135–144)
WBC # BLD: 4.5 K/UL (ref 4.8–10.8)

## 2021-08-22 PROCEDURE — 99232 SBSQ HOSP IP/OBS MODERATE 35: CPT | Performed by: SURGERY

## 2021-08-22 PROCEDURE — 2580000003 HC RX 258: Performed by: PHYSICIAN ASSISTANT

## 2021-08-22 PROCEDURE — 6370000000 HC RX 637 (ALT 250 FOR IP): Performed by: PHYSICIAN ASSISTANT

## 2021-08-22 PROCEDURE — 85027 COMPLETE CBC AUTOMATED: CPT

## 2021-08-22 PROCEDURE — 36415 COLL VENOUS BLD VENIPUNCTURE: CPT

## 2021-08-22 PROCEDURE — 6360000002 HC RX W HCPCS: Performed by: PHYSICIAN ASSISTANT

## 2021-08-22 PROCEDURE — 94799 UNLISTED PULMONARY SVC/PX: CPT

## 2021-08-22 PROCEDURE — 97162 PT EVAL MOD COMPLEX 30 MIN: CPT

## 2021-08-22 PROCEDURE — 97165 OT EVAL LOW COMPLEX 30 MIN: CPT

## 2021-08-22 PROCEDURE — 71045 X-RAY EXAM CHEST 1 VIEW: CPT

## 2021-08-22 PROCEDURE — 80048 BASIC METABOLIC PNL TOTAL CA: CPT

## 2021-08-22 PROCEDURE — 1210000000 HC MED SURG R&B

## 2021-08-22 RX ADMIN — ACETAMINOPHEN 650 MG: 325 TABLET ORAL at 18:19

## 2021-08-22 RX ADMIN — ACETAMINOPHEN 650 MG: 325 TABLET ORAL at 23:03

## 2021-08-22 RX ADMIN — ACETAMINOPHEN 650 MG: 325 TABLET ORAL at 12:22

## 2021-08-22 RX ADMIN — TIMOLOL MALEATE 1 DROP: 5 SOLUTION/ DROPS OPHTHALMIC at 09:14

## 2021-08-22 RX ADMIN — DORZOLAMIDE HYDROCHLORIDE 1 DROP: 20 SOLUTION/ DROPS OPHTHALMIC at 09:14

## 2021-08-22 RX ADMIN — FINASTERIDE 5 MG: 5 TABLET, FILM COATED ORAL at 08:51

## 2021-08-22 RX ADMIN — CARBIDOPA AND LEVODOPA 1 TABLET: 25; 100 TABLET ORAL at 08:51

## 2021-08-22 RX ADMIN — KETOROLAC TROMETHAMINE 15 MG: 15 INJECTION, SOLUTION INTRAMUSCULAR; INTRAVENOUS at 06:47

## 2021-08-22 RX ADMIN — SODIUM CHLORIDE, PRESERVATIVE FREE 10 ML: 5 INJECTION INTRAVENOUS at 23:11

## 2021-08-22 RX ADMIN — LATANOPROST 1 DROP: 50 SOLUTION OPHTHALMIC at 09:14

## 2021-08-22 RX ADMIN — OXYBUTYNIN CHLORIDE 5 MG: 5 TABLET, EXTENDED RELEASE ORAL at 08:51

## 2021-08-22 RX ADMIN — CARBIDOPA AND LEVODOPA 1 TABLET: 25; 100 TABLET ORAL at 14:18

## 2021-08-22 RX ADMIN — MIDODRINE HYDROCHLORIDE 5 MG: 5 TABLET ORAL at 17:07

## 2021-08-22 RX ADMIN — CYANOCOBALAMIN TAB 500 MCG 1000 MCG: 500 TAB at 08:51

## 2021-08-22 RX ADMIN — METHOCARBAMOL 750 MG: 500 TABLET ORAL at 17:07

## 2021-08-22 RX ADMIN — METHOCARBAMOL 750 MG: 500 TABLET ORAL at 13:24

## 2021-08-22 RX ADMIN — DORZOLAMIDE HYDROCHLORIDE 1 DROP: 20 SOLUTION/ DROPS OPHTHALMIC at 23:14

## 2021-08-22 RX ADMIN — DOCUSATE SODIUM 50 MG AND SENNOSIDES 8.6 MG 1 TABLET: 8.6; 5 TABLET, FILM COATED ORAL at 08:50

## 2021-08-22 RX ADMIN — METHOCARBAMOL 750 MG: 500 TABLET ORAL at 23:03

## 2021-08-22 RX ADMIN — DOCUSATE SODIUM 50 MG AND SENNOSIDES 8.6 MG 1 TABLET: 8.6; 5 TABLET, FILM COATED ORAL at 23:04

## 2021-08-22 RX ADMIN — MIDODRINE HYDROCHLORIDE 5 MG: 5 TABLET ORAL at 08:50

## 2021-08-22 RX ADMIN — ACETAMINOPHEN 650 MG: 325 TABLET ORAL at 06:47

## 2021-08-22 RX ADMIN — MIDODRINE HYDROCHLORIDE 5 MG: 5 TABLET ORAL at 12:22

## 2021-08-22 RX ADMIN — ENOXAPARIN SODIUM 30 MG: 30 INJECTION SUBCUTANEOUS at 23:04

## 2021-08-22 RX ADMIN — SODIUM CHLORIDE, PRESERVATIVE FREE 10 ML: 5 INJECTION INTRAVENOUS at 09:16

## 2021-08-22 RX ADMIN — ENOXAPARIN SODIUM 30 MG: 30 INJECTION SUBCUTANEOUS at 08:50

## 2021-08-22 RX ADMIN — METHOCARBAMOL 750 MG: 500 TABLET ORAL at 08:50

## 2021-08-22 RX ADMIN — FOLIC ACID 1000 MCG: 1 TABLET ORAL at 08:50

## 2021-08-22 RX ADMIN — CARBIDOPA AND LEVODOPA 1 TABLET: 25; 100 TABLET ORAL at 23:42

## 2021-08-22 RX ADMIN — POLYETHYLENE GLYCOL 3350 17 G: 17 POWDER, FOR SOLUTION ORAL at 08:50

## 2021-08-22 RX ADMIN — TIMOLOL MALEATE 1 DROP: 5 SOLUTION/ DROPS OPHTHALMIC at 23:14

## 2021-08-22 ASSESSMENT — PAIN SCALES - GENERAL
PAINLEVEL_OUTOF10: 5
PAINLEVEL_OUTOF10: 0
PAINLEVEL_OUTOF10: 3
PAINLEVEL_OUTOF10: 2
PAINLEVEL_OUTOF10: 6
PAINLEVEL_OUTOF10: 0

## 2021-08-22 ASSESSMENT — PAIN DESCRIPTION - ORIENTATION: ORIENTATION: RIGHT

## 2021-08-22 ASSESSMENT — PAIN DESCRIPTION - FREQUENCY: FREQUENCY: INTERMITTENT

## 2021-08-22 ASSESSMENT — PAIN DESCRIPTION - DESCRIPTORS: DESCRIPTORS: ACHING;DISCOMFORT

## 2021-08-22 ASSESSMENT — PAIN DESCRIPTION - PAIN TYPE: TYPE: ACUTE PAIN

## 2021-08-22 ASSESSMENT — PAIN DESCRIPTION - LOCATION: LOCATION: RIB CAGE

## 2021-08-22 NOTE — PROGRESS NOTES
Physical Therapy Med Surg Initial Assessment  [facility]  Room: [place] [address]       NAME: Anay Mendes  : 1940 (44 y.o.)  MRN: [id number]  CODE STATUS: Full Code    Date of Service: 2021    Patient Diagnosis(es): Closed head injury, initial encounter [Q30.96BZ]  Fall, initial encounter [W19. XXXA]  Closed fracture of one rib of right side with delayed healing [S22.31XG]  Closed fracture of one rib of right side, initial encounter [S22.31XA]  Pneumothorax, unspecified type [J93.9]  Pneumonia due to infectious organism, unspecified laterality, unspecified part of lung [J18.9]   Chief Complaint   Patient presents with    Fall     fall from standing last night.   presents with lower back pain and swelling to upper lip.  denies LOC, head or neck pain     Patient Active Problem List    Diagnosis Date Noted    Closed fracture of one rib of right side with delayed healing 2021    Acute cystitis without hematuria     Frequency of micturition     BMI 22.0-22.9, adult 2020    Abnormality of gait and mobility 2020    Hematoma     Orthostasis 2020    Gait abnormality due to Severe Parkinson's Disease with Blunt Chest Trauma; rehab admission 20. 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    Primary orthostatic hypotension 2018    Macular degeneration     Glaucoma     BPH (benign prostatic hyperplasia)     Anemia 2014    Aortic valve stenosis, severe 2013    LVH (left ventricular hypertrophy)     Diastolic dysfunction     Hypertension 2011 Past Medical History:   Diagnosis Date    Actinic keratoses     Aortic valve stenosis, severe 9/20/2013    BPH (benign prostatic hyperplasia)     Cancer ([facility] [address] 04/2018    skin cancer on chest removed    Colon polyp 88/85/6815    Diastolic dysfunction 66/78/7547    Stage 2 by echo    ED (erectile dysfunction) 11/29/2011    Glaucoma     Dr. Shyla Gutiérrez Hemochromatosis     History of echocardiogram 02/18/2015    mild AS, mild AR, mild TR, mild MR    [place]    Hypertension     Iron deficiency anemia 4/28/2014    LVH (left ventricular hypertrophy) 9/20/2013    Macular degeneration     bilateral; dry on left and wet on right-Dr. Tara Plunkett on back 5/14/2015    Osteoarthritis cervical spine     Parkinson disease ([facility] [address] [facility]     Dr. Samantha Huang     Past Surgical History:   Procedure Laterality Date    BACK SURGERY      CARDIAC CATHETERIZATION  11/09/2017    [facility] T[facility]AR ECHOL[facility] MD    CATARACT REMOVAL      COLONOSCOPY  11/2009    single polyp right colon    EYE SURGERY Bilateral     OTHER SURGICAL HISTORY  06/08/15    EXC SUBCU MASS BACK    TONSILLECTOMY         Chart Reviewed: Yes  Patient assessed for rehabilitation services?: Yes  Family / Caregiver Present: No    Restrictions:  Restrictions/Precautions: Fall Risk (high cordova score)     SUBJECTIVE:      Pain  Pre Treatment Pain Screening  Pain at present: 0  Intervention List: Patient able to continue with treatment    Post Treatment Pain Screening:   Pain Screening  Patient Currently in Pain: Denies (denies pain at rest; 8/10 rib pain with rolling onto R side)  Pain Assessment  Pain Level: 0    Prior Level of Function:  Social/Functional History  Lives With: Spouse (per pt- wife is able to assist pt)  Type of Home: House  Home Layout: Two level, Bed/Bath upstairs  Home Access: Level entry  Bathroom Shower/Tub: Walk-in shower  Bathroom Toilet: Standard  Bathroom Equipment: Grab bars in shower  Home Equipment: 4 wheeled walker, Cane  ADL Assistance: Needs assistance (wife only intermittently assisted with socks; wife assists with bathing)  Homemaking Assistance: Independent (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, laundry, and dishes at [employer])  Homemaking Responsibilities: Yes  Ambulation Assistance: Independent (uses cane)  Transfer Assistance: Independent  Active : No  Occupation: Retired  Type of occupation: [employer]  Leisure & Hobbies: garden    OBJECTIVE:   Vision: Impaired  Vision Exceptions: Wears glasses for reading (and for driving)  Hearing: Within functional limits    Cognition:  Overall Orientation Status: Within Functional Limits  Follows Commands: Within Functional Limits    Observation/Palpation  Observation: on O2- no SOB with activity; pleasant and agreeable to therapy eval; decreased insight into current deficits; no c/o dizziness with standing or amb    ROM:  RLE AROM: WFL  LLE AROM : WFL    Strength:  Strength RLE  Comment: grossly 4-/5  Strength LLE  Comment: grossly 4-/5    Neuro:  Balance  Posture: Fair  Sitting - Static: Good  Sitting - Dynamic: Good  Standing - Static: Fair;-  Standing - Dynamic: Fair;- (requires B UE support for steadiness)     Tone RLE  RLE Tone: Normotonic  Tone LLE  LLE Tone: Normotonic  Motor Control  Gross Motor?: WFL  Sensation  Overall Sensation Status: WFL    Bed mobility  Supine to Sit: Stand by assistance  Sit to Supine:  (NT pt left in bedside chair)  Comment: HOB elevated, pt used rail, increased time and effort exhibited    Transfers  Sit to Stand: Minimal Assistance  Stand to sit: Minimal Assistance  Comment: poor safety with hand placement during transfers; cues for nose over toes    Ambulation  Ambulation?: Yes  Ambulation 1  Surface: level tile  Device: Rolling Walker  Assistance: Contact guard assistance  Quality of Gait: decreased steadiness  Gait Deviations: Slow Renetta;Decreased step length;Decreased step height  Distance: 10ft    Stairs/Curb  Stairs?: No Activity Tolerance  Activity Tolerance: Pt limited by unsteadiness          PT Education  PT Education: Goals;PT Role;Plan of Care;General Safety; Functional Mobility Training;Transfer Training;Gait Training    ASSESSMENT:   Body structures, Functions, Activity limitations: Decreased functional mobility ; Decreased safe awareness;Decreased balance;Decreased ROM; Decreased endurance; Increased pain;Decreased strength;Decreased posture  Decision Making: Medium Complexity  History: high  Exam: med  Clinical Presentation: med    Prognosis: Good    DISCHARGE RECOMMENDATIONS:  Discharge Recommendations: Continue to assess pending progress    Assessment: Pt exhibits decreased insight into deficits- stating he feels he is ready to return home but required CGA to min A during functional mobility to maintain safety and avoid falls. Pt also exhibits decreased LE strength, rib pain with movement, and decreased balance. PT educated pt in use of walker at home for safe mobility vs using cane- pt stated understanding but requires continued education to avoid continued falls at home. Continued PT is indicated for safe return home with wife and to avoid continued falls. REQUIRES PT FOLLOW UP: Yes      PLAN OF CARE:  Plan  Times per week: 3-6  Current Treatment Recommendations: Strengthening, Transfer Training, Endurance Training, Neuromuscular Re-education, Patient/Caregiver Education & Training, ROM, Manual Therapy - Soft Tissue Mobilization, Pain Management, Equipment Evaluation, Education, & procurement, Balance Training, Gait Training, Home Exercise Program, Functional Mobility Training, Stair training, Safety Education & Training, Positioning  Safety Devices  Type of devices: Call light within reach, Chair alarm in place, Left in chair    Goals:  Patient goals : \"go home\"  Long term goals  Long term goal 1: Pt will demonstrate bed mobility indep for return to PLOF.   Long term goal 2: Pt will demonstrate transfers SBA with [employer] and with demonstration of safe handplacement 100% of the time for safe return home with wife. Long term goal 3: Pt will demonstrate amb SBA with [employer] 100ft to allow pt to safely amb inside his home. Long term goal 4: Pt will demonstrate stair negotiation 1 flight with B rails SBA to allow pt to go upstairs to bedroom and bathroom. [facility] (6 CLICK) BASIC MOBILITY  AM-PAC Inpatient Mobility Raw Score : 17    Therapy Time:   Individual   Time In [facility]   Time Out 0838   Minutes [address], [facility], 08/22/21 at 8:50 AM         Definitions for assistance levels  Independent = pt does not require any physical supervision or assistance from another person for activity completion. Device may be needed.   Stand by assistance = pt requires verbal cues or instructions from another person, close to but not touching, to perform the activity  Minimal assistance= pt performs 75% or more of the activity; assistance is required to complete the activity  Moderate assistance= pt performs 50% of the activity; assistance is required to complete the activity  Maximal assistance = pt performs 25% of the activity; assistance is required to complete the activity  Dependent = pt requires total physical assistance to accomplish the task

## 2021-08-22 NOTE — PROGRESS NOTES
Pt assessment complete. Pt is alert and oriented. Medicated for pain per schedule. Assisted earlier to bathroom and gait was only slightly unsteady with the walker. Has been using the acapella at bedside. Pt denies nausea. Resting in bed watching TV. Call light in reach. Bed alarm on.

## 2021-08-22 NOTE — PROGRESS NOTES
TRAUMA DAILY PROGRESS NOTE      Patient Name: Merlin Simmonds  Admission Date 8/21/2021    Hospital Day: 1  Patient seen and examined on 8/22/2021    INTERVAL HISTORY/EVENTS     Background:  Merlin Simmonds is a 79 y.o. male with PMHx of AV stenosis, diastolic dysfunction, BPH, glaucoma, macular degeneration, hemochromatosis, HTN, Parkinson disease. Patient presented to [facility] EMERGENCY [facility] AT [place] ED on 8/21/21 s/p FFS on the previous day due to feeling dizzy when standing up. Patient with complaint of right-sided back/flank pain, right perispinal neck pain, and swollen lip     Trauma workup significant for right 9th posterior rib fracture with tiny 1-2% right-sided apical pneumothorax. Patient admitted to [facility] on Trauma Service for multimodal pain control and optimization of respiratory status. Hospital Course:  8/21/2021: FFS on previous day. Presented to ED for back pain, neck pain, swollen lip. Found to have right 9th posterior rib fx with small right-sided apical PTX. Admitted to Trauma RNF. rCXR in evening stable with small apical PTX      24 Hour Events:  No acute events overnight. Patient reports 0/10 back pain when laying still. Pain increases to 8/10 with movement. Denies SOB. On 3L supplemental O2 this AM. Weaned to 1L during AM encounter. Pulling 2000-2250cc on IS. Also using acapella at bedside. Worked with PT/OT and demonstrated decreased insight into deficits. Recommending continued PT. Tolerating PO. Voiding spontaneously. No BMs, has hx of constipation. Vitals reviewed: afebrile. Not tachycardic. Normotensive. % on 3L O2. Weaned to 1L O2 in AM. Weaned to RA in afternoon. Labs reviewed: H/H downtrend to 8.4/24.6 (10.5/30.8). No leukocytosis. Mild thrombocytopenia at 92 (114). BUN/Cr increased to 18/0.93 (10/0.62).        Intake:480cc   IVF: none   PO: 480cc  Output: none recorded   UOP: x1 occurrence    BM x0 since admit      PHYSICAL EXAM     Vitals Average, Min and Max for last 24 hours:  Temp: Temp: 99 °F (37.2 °C); Temp  Av.7 °F (37.1 °C)  Min: 98.4 °F (36.9 °C)  Max: 99 °F (37.2 °C)  Respirations: Resp  Avg: 15.1  Min: 12  Max: 19  Pulse: Pulse  Av.9  Min: 69  Max: 83  Blood Pressure: Systolic (94QWW), NJT:689 , Min:103 , JPW:506   ; Diastolic (87TEB), PAJ:91, Min:56, Max:93    SpO2: SpO2  Av.1 %  Min: 94 %  Max: 100 %    24hr Intake/Output:   Intake/Output Summary (Last 24 hours) at 2021 0758  Last data filed at 2021 1850  Gross per 24 hour   Intake 480 ml   Output --   Net 480 ml       Vitals: /65   Pulse 69   Temp 99 °F (37.2 °C) (Oral)   Resp 19   Ht 6' (1.829 m)   Wt 160 lb (72.6 kg)   SpO2 100%   BMI 21.70 kg/m²     Physical Exam:  Constitutional: Sitting up comfortably in bed reading newspaper this AM  HEENT: Swollen upper lip improved from yesterday. Cardiovascular: Regular rate and rhythm. Pulmonary: Clear to ausculation bilaterally. No wheezing, rhonchi or rales. IS 2250cc. Weaned from 3L to RA by late afternoon with SPO2 sustained at 100%. Abdominal: Soft. Non-distended. Non-tender. Musculoskeletal: Good ROM in all extremities. No edema. Neurological: Alert, awake, and orientated x 3. Motor and sensory grossly intact. No focal deficits. GCS of 15.   Skin: warm, dry    LABORATORY RESULTS (LAST 24 HOURS)     CBC with Differential:    Lab Results   Component Value Date    WBC 7.7 2021    RBC 2.86 2021    RBC 3.79 2011    HGB 10.5 2021    HCT 30.8 2021     2021    .5 2021    MCH 36.8 2021    MCHC 34.2 2021    RDW 15.5 2021    NRBC 1 2020    BANDSPCT 1 2020    METASPCT 1 2020    LYMPHOPCT 6.4 2021    PROMYELOPCT 1 2020    MONOPCT 8.8 2021    MYELOPCT 1 2020    EOSPCT 3.9 2011    BASOPCT 0.3 2021    MONOSABS 0.7 2021    LYMPHSABS 0.5 2021    EOSABS 0.1 2021    BASOSABS 0.0 2021     CMP:    Lab Results   Component Value Date     08/21/2021    K 3.8 08/21/2021    K 3.6 07/01/2020     08/21/2021    CO2 23 08/21/2021    BUN 10 08/21/2021    CREATININE 0.62 08/21/2021    GFRAA >60.0 08/21/2021    LABGLOM >60.0 08/21/2021    GLUCOSE 128 08/21/2021    GLUCOSE 81 12/01/2011    PROT 5.9 08/21/2021    LABALBU 3.8 08/21/2021    LABALBU 4.5 12/01/2011    CALCIUM 8.7 08/21/2021    BILITOT 0.7 08/21/2021    ALKPHOS 49 08/21/2021    AST 15 08/21/2021    ALT <5 08/21/2021     Magnesium:    Lab Results   Component Value Date    MG 1.7 06/29/2020     PT/INR:    Lab Results   Component Value Date    PROTIME 12.9 08/21/2021    INR 1.0 08/21/2021     PTT:    Lab Results   Component Value Date    APTT 24.7 08/21/2021       IMAGING RESULTS (PERSONALLY REVIEWED)     rCXR: equivocal tiny right apical PTX of less than 2%. Minimal patchy opacity at the right base. No significant change. Lungs otherwise clear. ASSESSMENT & PLAN     Diagnoses:  1. S/p FFS (8/20/21, presented 8/21/21)  2. right 9th posterior rib fracture  3. tiny 1-2% right-sided apical pneumothorax (stable)  4. Acute pain due to trauma    PMHx: AV stenosis, diastolic dysfunction, BPH, glaucoma, macular degeneration, hemochromatosis, HTN, Parkinson disease    Incidental Findings: Needs discussed  - likely hepatic cysts  - probable right parapelvic cyst  - likely renal cysts  - White matter and periventricular changes most likely consistent with chronic small vessel disease      ASSESSMENT/PLAN:  Neurological: Acute pain due to trauma, Hx Parkinson's disease  - Continue Tylenol 650mg q6hr  - Continue Robaxin 750mg QID  - Continue Tramadol 50/100mg q6hr  - Discontinue toradol due to bump in BUN/Cr  - Continue home carbidopa-levodopa 25-100mg TID  - Home Droxidopa 100mg TID not available in hospital. No approved substitution for this medication. Advised patient's wife to bring in medication to be approved by our team and identified by pharmacy.       Cardiovascular: No acute cardiac issues  - Continue home valsartan 40mg daily  - Continue home midodrine 5mg TID     Respiratory: right posterior 9th rib fracture  - Maintain O2 sats > 90%  - Continue IS, pulling 2250cc  - Continue Acapella  - Weaned to RA     GI/Diet:   - Continue regular diet  - Continue bowel regimen (Senokot-S BID, miralax daily prn). - Advised wife to bring home Linzess if patient would like to continue taking home medication for constipation. Renal/Electrolytes: MIRANDA with BUN/Cr increased to 18/0.93 (10/0.62)  - Discontinued toradol  - Continue home finasteride 5mg daily and oxybutynin 5mg daily  - HLIV  - AM BMP/Mg     ID: No active infection. Remains afebrile, normotensive, and without leukocytosis. - No indication for abx at this time  - RLL opacity at left base suspected to be atelectasis as patient is non-toxic appearing and has no other signs/symptoms of infection     Heme: H/H slight downtrend. Do not suspect patient is actively bleeding. - AM CBC  - No indication for transfusion     Endocrine:   - No acute glycemic issues     MSK:   - Spines: cleared  - Weight Bearing Restrictions: cleared  - Activity: OOBAT  - PT/OT: poor insight to deficits. Requiring continued education to avoid continued falls at home. Recommending home PT    Prophylaxis:   - SCDs and Lovenox 30mg BID for VTE PPx    Lines/Tubes/Drains:  - Maintain PIVs  - No indication for goel catheter, monitor for urinary retention    Dispo: Medically cleared for home. Anticipate discharge home tomorrow with home PT. Accom Status: General Status      Follow up with Trauma prn  Follow up with PCP for incidental findings      Please call for any questions or concerns.     Casey Scott, [address] [facility].  [phone] ([phone]) [phone]     This patient's plan of care was discussed and made in collaboration with Trauma Attending physician, Nacho De Leon MD.

## 2021-08-22 NOTE — PROGRESS NOTES
Medical History:   Diagnosis Date    Actinic keratoses     Aortic valve stenosis, severe 9/20/2013    BPH (benign prostatic hyperplasia)     Cancer ([facility] [address] 04/2018    skin cancer on chest removed    Colon polyp 90/53/1223    Diastolic dysfunction 58/97/9417    Stage 2 by echo    ED (erectile dysfunction) 11/29/2011    Glaucoma     Dr. Chuck Joy Hemochromatosis     History of echocardiogram 02/18/2015    mild AS, mild AR, mild TR, mild MR    [place]    Hypertension     Iron deficiency anemia 4/28/2014    LVH (left ventricular hypertrophy) 9/20/2013    Macular degeneration     bilateral; dry on left and wet on right-Dr. Marisol Douglas on back 5/14/2015    Osteoarthritis cervical spine     Parkinson disease ([facility] [address]     Dr. Minoo Campuzano     Past Surgical History:   Procedure Laterality Date    BACK SURGERY      CARDIAC CATHETERIZATION  11/09/2017    [facility] MD    CATARACT REMOVAL      COLONOSCOPY  11/2009    single polyp right colon    EYE SURGERY Bilateral     OTHER SURGICAL HISTORY  06/08/15    EXC SUBCU MASS BACK    TONSILLECTOMY          Restrictions  Restrictions/Precautions: Fall Risk     Safety Devices: Safety Devices  Safety Devices in place: Yes  Type of devices:  All fall risk precautions in place   Initially in place: No    Subjective  Pre Treatment Pain Screening  Pain at present: 3  Scale Used: Numeric Score  Intervention List: Patient able to continue with treatment    Pain Reassessment:   Pain Assessment  Patient Currently in Pain: Yes  Pain Assessment: 0-10  Pain Level: 3  Pain Type: Acute pain  Pain Location: Rib cage  Pain Orientation: Right  Pain Descriptors: Aching, Discomfort  Pain Frequency: Intermittent       Prior Level of Function:  Social/Functional History  Lives With: Spouse  Type of Home: House  Home Layout: Two level, Bed/Bath upstairs (1st floor bathroom)  Home Access: Level entry  Bathroom Shower/Tub: Walk-in shower  Bathroom Toilet: Standard  Bathroom Equipment: Grab bars in shower  Home Equipment: 4 wheeled walker, Cane, Reacher  ADL Assistance: Needs assistance (intermittently assists with socks/shoes and bathing)  Homemaking Assistance: Independent (vacuuming, swiffer/duster)  Homemaking Responsibilities: Yes  Ambulation Assistance: Independent  Transfer Assistance: Independent  Active : No  Occupation: Retired  Type of occupation: [employer]  Leisure & Hobbies: golfing, football games  IADL Comments: completes medications/finances    OBJECTIVE:     Orientation Status:  Orientation  Overall Orientation Status: Within Functional Limits    Observation:  Observation/Palpation  Posture: Fair  Observation: on RA, pleasant, no acute distress, agreeable to OT evaluation    Cognition Status:  Cognition  Overall Cognitive Status: Exceptions  Arousal/Alertness: Appropriate responses to stimuli  Following Commands:  Follows all commands without difficulty  Attention Span: Appears intact  Memory: Decreased short term memory, Decreased recall of recent events  Safety Judgement: Decreased awareness of need for safety, Decreased awareness of need for assistance  Problem Solving: Assistance required to identify errors made, Assistance required to implement solutions, Assistance required to generate solutions, Assistance required to correct errors made  Insights: Decreased awareness of deficits  Initiation: Requires cues for some  Sequencing: Requires cues for some    Perception Status:  Perception  Overall Perceptual Status: WFL    Sensation Status:  Sensation  Overall Sensation Status: [facility]    Vision and Hearing Status:  Vision  Vision: Impaired  Vision Exceptions: Wears glasses for reading  Hearing  Hearing: Within functional limits     ROM:   LUE AROM (degrees)  LUE AROM : WFL  Left Hand AROM (degrees)  Left Hand AROM: WFL  RUE AROM (degrees)  RUE AROM : WFL  Right Hand AROM (degrees)  Right Hand AROM: WFL    Strength:  LUE Strength  Gross LUE Strength: Exceptions to [facility]  L Hand General: 3+/5  LUE Strength Comment: 3+/5 all planes  RUE Strength  Gross RUE Strength: Exceptions to [facility]  R Hand General: 3+/5  RUE Strength Comment: 3+/5 all planes    Coordination, Tone, Quality of Movement:    Tone RUE  RUE Tone: Normotonic  Tone LUE  LUE Tone: Normotonic  Coordination  Movements Are Fluid And Coordinated: No  Coordination and Movement description: Fine motor impairments, Right UE, Decreased accuracy, Decreased speed, Left UE    Hand Dominance:  Hand Dominance  Hand Dominance: Right    ADL Status:  ADL  Feeding: Setup  Grooming: Setup, Supervision  UE Bathing: Supervision  LE Bathing: Contact guard assistance  UE Dressing: Contact guard assistance  LE Dressing: Contact guard assistance  Toileting: Contact guard assistance  Additional Comments: Simulated all ADLs as above  Toilet Transfers  Toilet - Technique: Ambulating  Equipment Used: Grab bars  Toilet Transfer: Contact guard assistance       Therapy key for assistance levels -   Independent = Pt. is able to perform task with no assistance but may require a device   Stand by assistance = Pt. does not perform task at an independent level but does not need physical assistance, requires verbal cues  Minimal, Moderate, Maximal Assistance = Pt. requires physical assistance (25%, 50%, 75% assist from helper) for task but is able to actively participate in task   Dependent = Pt. requires total assistance with task and is not able to actively participate with task completion     Functional Mobility:  Functional Mobility  Functional - Mobility Device: Rolling Walker  Activity: To/from bathroom  Assist Level: Contact guard assistance  Transfers  Sit to stand: Contact guard assistance  Stand to sit: Contact guard assistance    Bed Mobility  Bed mobility  Supine to Sit: Unable to assess  Sit to Supine: Stand by assistance  Comment: HOB elevated    Seated and Standing Balance:  Balance  Sitting Balance: Training, Equipment Evaluation, Education, & procurement, Endurance Training, Neuromuscular Re-education, Self-Care / ADL, Cognitive/Perceptual Training, Safety Education & Training    Goals:   Patient will:    - Improve functional endurance to tolerate/complete 30 mins of ADL's  - Be Mod I in UB ADLs   - Be Mod I in LB ADLs  - Be Mod I in ADL transfers without LOB  - Be Mod I in toileting tasks  - Improve B hand fine motor coordination to [facility] in order to manage clothing fasteners/self-care containers in a timely manner  - Improve B UE strength and endurance to [facility] in order to participate in self-care activities as projected. - Access appropriate D/C site with as few architectural barriers as possible.   - Sequence self-care tasks with no verbal cues    Patient Goal: Patient goals : \"to get this rib and pneumonia healed\"    Discussed and agreed upon: Yes Comments:     Therapy Time:   OT Individual Minutes  Time In: 9362  Time Out: 0935  Minutes: 17    Eval: 17 minutes     Electronically signed by:    Eddi Kang OT, OTR/L  8/22/2021, 9:42 AM

## 2021-08-22 NOTE — CARE COORDINATION
MET WITH PT AND WIFE. PT PLANS TO RETURN HOME W/SPOUSE AND CONTINUE HIS OP PT AT [facility]  [address] FRED ORDER FOR OP PT. DENIES OTHER NEEDS AT THIS TIME. [facility] EMERGENCY [facility] CENTER AT [place] Case Management Initial Discharge Assessment    Met with Patient to discuss discharge plan. PCP: Wyatt Hassan MD                                Date of Last Visit: \"MAY\"    If no PCP, list provided? N/A    Discharge Planning    Living Arrangements: independently at home    Who do you live with? WIFE    Who helps you with your care:  self    If lives at home:     Do you have any barriers navigating in your home? no    Patient can perform ADL? Yes    Current Services (outpatient and in home) : Outpatient PT/OT ([facility] )    Dialysis: No    Is transportation available to get to your appointments? Yes    DME Equipment:  yes - ROLATOR, 2WW, GRAB BARS, CANE, RAISED TOILET SEAT    Respiratory equipment: None    Respiratory provider:  no     Pharmacy:  yes - [address] with Medication Assistance Program?  No      Patient agreeable to [place] [address]? Declined    Patient agreeable to SNF/Rehab? Declined    Other discharge needs identified? N/A    Does Patient Have a High-Risk for Readmission Diagnosis (CHF, PN, MI, COPD)? No    Initial Discharge Plan? (Note: please see concurrent daily documentation for any updates after initial note).     PLANS TO RETURN HOME W/SPOUSE. MAY NEED FRED ORDER FOR OP PT AT [facility]    Readmission Risk              Risk of Unplanned Readmission:  15         Electronically signed by Kimberlyn Hermosillo RN on 8/22/2021 at 3:04 PM

## 2021-08-22 NOTE — FLOWSHEET NOTE
Pt is A&Ox4. PERRLA. Equal strength, follows commands. Denies any headache or nausea. No fever or chills. No SOB/dyspnea, lungs are clear with diminished bases. Pain to right side of abdomen only when moving, no pain at rest. Denies any chest pain, murmur present. Abdomen is tender to touch on right side, bowel sounds are present x4. No pain with urination, no edema. Pt uses walker to ambulate with contact guard. Up to chair. Call light is within reach.

## 2021-08-23 VITALS
TEMPERATURE: 98.2 F | OXYGEN SATURATION: 97 % | WEIGHT: 160 LBS | BODY MASS INDEX: 21.67 KG/M2 | HEIGHT: 72 IN | RESPIRATION RATE: 18 BRPM | SYSTOLIC BLOOD PRESSURE: 100 MMHG | HEART RATE: 66 BPM | DIASTOLIC BLOOD PRESSURE: 60 MMHG

## 2021-08-23 LAB
ANION GAP SERPL CALCULATED.3IONS-SCNC: 12 MEQ/L (ref 9–15)
BUN BLDV-MCNC: 18 MG/DL (ref 8–23)
CALCIUM SERPL-MCNC: 7.9 MG/DL (ref 8.5–9.9)
CHLORIDE BLD-SCNC: 99 MEQ/L (ref 95–107)
CO2: 25 MEQ/L (ref 20–31)
CREAT SERPL-MCNC: 0.73 MG/DL (ref 0.7–1.2)
GFR AFRICAN AMERICAN: >60
GFR NON-AFRICAN AMERICAN: >60
GLUCOSE BLD-MCNC: 107 MG/DL (ref 70–99)
HCT VFR BLD CALC: 25.4 % (ref 42–52)
HEMOGLOBIN: 8.6 G/DL (ref 14–18)
MCH RBC QN AUTO: 36.2 PG (ref 27–31.3)
MCHC RBC AUTO-ENTMCNC: 33.9 % (ref 33–37)
MCV RBC AUTO: 106.9 FL (ref 80–100)
PDW BLD-RTO: 16 % (ref 11.5–14.5)
PLATELET # BLD: 92 K/UL (ref 130–400)
POTASSIUM REFLEX MAGNESIUM: 3.6 MEQ/L (ref 3.4–4.9)
RBC # BLD: 2.37 M/UL (ref 4.7–6.1)
SODIUM BLD-SCNC: 136 MEQ/L (ref 135–144)
WBC # BLD: 4.6 K/UL (ref 4.8–10.8)

## 2021-08-23 PROCEDURE — 80048 BASIC METABOLIC PNL TOTAL CA: CPT

## 2021-08-23 PROCEDURE — 99239 HOSP IP/OBS DSCHRG MGMT >30: CPT | Performed by: PHYSICIAN ASSISTANT

## 2021-08-23 PROCEDURE — 6370000000 HC RX 637 (ALT 250 FOR IP): Performed by: PHYSICIAN ASSISTANT

## 2021-08-23 PROCEDURE — 97535 SELF CARE MNGMENT TRAINING: CPT

## 2021-08-23 PROCEDURE — 85027 COMPLETE CBC AUTOMATED: CPT

## 2021-08-23 PROCEDURE — 6360000002 HC RX W HCPCS: Performed by: PHYSICIAN ASSISTANT

## 2021-08-23 PROCEDURE — 36415 COLL VENOUS BLD VENIPUNCTURE: CPT

## 2021-08-23 PROCEDURE — 94799 UNLISTED PULMONARY SVC/PX: CPT

## 2021-08-23 PROCEDURE — 97116 GAIT TRAINING THERAPY: CPT

## 2021-08-23 PROCEDURE — 2580000003 HC RX 258: Performed by: PHYSICIAN ASSISTANT

## 2021-08-23 RX ORDER — METHOCARBAMOL 750 MG/1
750 TABLET, FILM COATED ORAL 3 TIMES DAILY
Qty: 21 TABLET | Refills: 0 | Status: SHIPPED | OUTPATIENT
Start: 2021-08-23 | End: 2021-08-30

## 2021-08-23 RX ORDER — ACETAMINOPHEN 325 MG/1
650 TABLET ORAL EVERY 6 HOURS PRN
Qty: 112 TABLET | Refills: 0 | Status: SHIPPED | OUTPATIENT
Start: 2021-08-23 | End: 2022-11-01

## 2021-08-23 RX ORDER — MIDODRINE HYDROCHLORIDE 5 MG/1
5 TABLET ORAL 3 TIMES DAILY
COMMUNITY

## 2021-08-23 RX ORDER — SODIUM CHLORIDE, SODIUM LACTATE, POTASSIUM CHLORIDE, AND CALCIUM CHLORIDE .6; .31; .03; .02 G/100ML; G/100ML; G/100ML; G/100ML
500 INJECTION, SOLUTION INTRAVENOUS ONCE
Status: COMPLETED | OUTPATIENT
Start: 2021-08-23 | End: 2021-08-23

## 2021-08-23 RX ORDER — LIDOCAINE 4 G/G
1 PATCH TOPICAL DAILY
Qty: 1 BOX | Refills: 0 | Status: SHIPPED | OUTPATIENT
Start: 2021-08-24 | End: 2021-09-07

## 2021-08-23 RX ADMIN — CYANOCOBALAMIN TAB 500 MCG 1000 MCG: 500 TAB at 10:49

## 2021-08-23 RX ADMIN — CARBIDOPA AND LEVODOPA 1 TABLET: 25; 100 TABLET ORAL at 10:48

## 2021-08-23 RX ADMIN — FOLIC ACID 1000 MCG: 1 TABLET ORAL at 10:48

## 2021-08-23 RX ADMIN — FINASTERIDE 5 MG: 5 TABLET, FILM COATED ORAL at 10:46

## 2021-08-23 RX ADMIN — DOCUSATE SODIUM 50 MG AND SENNOSIDES 8.6 MG 1 TABLET: 8.6; 5 TABLET, FILM COATED ORAL at 10:48

## 2021-08-23 RX ADMIN — LATANOPROST 1 DROP: 50 SOLUTION OPHTHALMIC at 11:02

## 2021-08-23 RX ADMIN — METHOCARBAMOL 750 MG: 500 TABLET ORAL at 10:47

## 2021-08-23 RX ADMIN — TIMOLOL MALEATE 1 DROP: 5 SOLUTION/ DROPS OPHTHALMIC at 10:45

## 2021-08-23 RX ADMIN — SODIUM CHLORIDE, POTASSIUM CHLORIDE, SODIUM LACTATE AND CALCIUM CHLORIDE 500 ML: 600; 310; 30; 20 INJECTION, SOLUTION INTRAVENOUS at 13:17

## 2021-08-23 RX ADMIN — CARBIDOPA AND LEVODOPA 1 TABLET: 25; 100 TABLET ORAL at 15:58

## 2021-08-23 RX ADMIN — MIDODRINE HYDROCHLORIDE 5 MG: 5 TABLET ORAL at 13:20

## 2021-08-23 RX ADMIN — ACETAMINOPHEN 650 MG: 325 TABLET ORAL at 05:58

## 2021-08-23 RX ADMIN — SODIUM CHLORIDE, PRESERVATIVE FREE 10 ML: 5 INJECTION INTRAVENOUS at 11:00

## 2021-08-23 RX ADMIN — OXYBUTYNIN CHLORIDE 5 MG: 5 TABLET, EXTENDED RELEASE ORAL at 10:46

## 2021-08-23 RX ADMIN — METHOCARBAMOL 750 MG: 500 TABLET ORAL at 13:20

## 2021-08-23 RX ADMIN — ACETAMINOPHEN 650 MG: 325 TABLET ORAL at 13:20

## 2021-08-23 RX ADMIN — ENOXAPARIN SODIUM 30 MG: 30 INJECTION SUBCUTANEOUS at 10:57

## 2021-08-23 RX ADMIN — MIDODRINE HYDROCHLORIDE 5 MG: 5 TABLET ORAL at 10:46

## 2021-08-23 RX ADMIN — DORZOLAMIDE HYDROCHLORIDE 1 DROP: 20 SOLUTION/ DROPS OPHTHALMIC at 10:49

## 2021-08-23 RX ADMIN — POLYETHYLENE GLYCOL 3350 17 G: 17 POWDER, FOR SOLUTION ORAL at 11:02

## 2021-08-23 ASSESSMENT — PAIN DESCRIPTION - LOCATION: LOCATION: RIB CAGE

## 2021-08-23 ASSESSMENT — PAIN SCALES - GENERAL
PAINLEVEL_OUTOF10: 3
PAINLEVEL_OUTOF10: 3
PAINLEVEL_OUTOF10: 5

## 2021-08-23 ASSESSMENT — PAIN DESCRIPTION - FREQUENCY: FREQUENCY: INTERMITTENT

## 2021-08-23 ASSESSMENT — PAIN DESCRIPTION - DESCRIPTORS: DESCRIPTORS: ACHING

## 2021-08-23 ASSESSMENT — PAIN DESCRIPTION - PAIN TYPE: TYPE: ACUTE PAIN

## 2021-08-23 ASSESSMENT — PAIN DESCRIPTION - ORIENTATION: ORIENTATION: RIGHT

## 2021-08-23 NOTE — PROGRESS NOTES
1145--Spoke with Alyssa from trauma to make her aware of the blood pressure, ok to hold diovan, she will be coming to see the patient    1230-- Alyssa in to see the patient, new orders received

## 2021-08-23 NOTE — DISCHARGE SUMMARY
tablet  Take 1 tablet by mouth daily             vitamin B-12 (CYANOCOBALAMIN) 1000 MCG tablet  Take 1,000 mcg by mouth daily Every other day. REASON FOR HOSPITALIZATION:  Cathy Wilburn a 79 y. o. male with PMHx of AV stenosis, diastolic dysfunction, BPH, glaucoma, macular degeneration, hemochromatosis, HTN, Parkinson disease. Patient presented to [facility] AT [place] ED on 8/21/21 s/p FFS on the previous day due to feeling dizzy when standing up. Patient with complaint of right-sided back/flank pain, right perispinal neck pain, and swollen lip     Trauma workup significant for right 9th posterior rib fracture with tiny 1-2% right-sided apical pneumothorax. Patient admitted to [facility] on Trauma Service for multimodal pain control and optimization of respiratory status. SIGNIFICANT FINDINGS:  Catalog of Injuries:   1. S/p FFS (8/20/21, presented 8/21/21)  2. right 9th posterior rib fracture  3. tiny 1-2% right-sided apical pneumothorax (stable)  4. Acute pain due to trauma    Incidental Findings: (Discussed by RAL on 8/23/21)  - likely hepatic cysts  - probable right parapelvic cyst  - likely renal cysts  - White matter and periventricular changes most likely consistent with chronic small vessel disease    HOSPITAL COURSE:  8/21/2021: FFS on previous day. Presented to ED for back pain, neck pain, swollen lip. Found to have right 9th posterior rib fx with small right-sided apical PTX. Admitted to [address] [place] [address] in evening stable with small apical PTX  8/22/2021: rCXR with stable small PTX. Weaned of RA. 2250cc on IS. PT/OT eval shows patient with poor insight of deficits. 8/23/2021: Remains stable on RA. IS 2250cc. Pain improving. Episode of insightful confusion in AM. Thought he was in the \"basement\" but knows that he is in the hospital \"room 283\". A&Ox4 and answers questions appropriately. Patient did have episode of hypotension in AM. Patient has not been drinking much fluid.  500cc bolus administered. SBP improved to 135 in afternoon and patient was able to successfully participate with PT/OT. Improved with PT/OT and able to perform stairs. PT/OT ultimately recommend acute rehab or home PT but patient and patient's wife decline services. Patient has outpatient therapy set up for Parkinson's and would like to continue his weekly physical therapy. At time of discharge, Jorge Gorman was tolerating a regular diet, having bowel movements, and had adequate analgesia on oral pain medications. Pt's activity level was OOBAT. The patient had no signs or symptoms of complications. Patient was determined stable for discharge to: [facility] (PT [address] ordered/made available to patient)    The patient was seen and examined on the day of discharge with the following findings:  Constitutional: Sitting up comfortably in bedside chair. HEENT: Swollen upper lip improved from yesterday. Cardiovascular: Regular rate and rhythm. Pulmonary: Clear to ausculation bilaterally. No wheezing, rhonchi or rales. IS 2250cc. On RA. Abdominal: Soft. Non-distended. Non-tender. Musculoskeletal: Good ROM in all extremities. No edema. Neurological: Alert, awake, and orientated x4. Motor and sensory grossly intact. No focal deficits. GCS of 15. Skin: warm, dry        ANTICIPATED FOLLOW UP:  Future Appointments   Date Time Provider Moose Mosqueda   9/22/2021  3:30 PM MD Heike Littlejohn   10/22/2021 10:00 AM MD Heike Hunter     - Follow up with Trauma prn for x1 rib fracture and stable tiny apical PTX  - Follow up with PCP for incidental findings      VTE RISK AT DISCHARGE:  Per trauma program protocol, the patient does not require post-discharge VTE prophylaxis due to: NWB single LE or BLE fractures limiting mobility.     --  Osmin Quinonez PA-C  Trauma/Critical Care  Emergency General Surgery  [phone] ([phone])  [phone]      35 minutes were spent on the discharge of this patient including final examination of the patient, discussion of the hospital stay, instructions for continuing care to all relevant caregivers, preparation of discharge records, prescriptions and referral forms, and clear identification of reasons to return to clinic or to emergency room.

## 2021-08-23 NOTE — DISCHARGE INSTR - COC
Continuity of Care Form    Patient Name: Tiffany Villa   :  1940  MRN:  [id number]    Admit date:  2021  Discharge date:  2021    Code Status Order: Full Code   Advance Directives:     Admitting Physician:  Marky Brooke MD  PCP: Jolie Youssef MD    Discharging Nurse: [facility] Unit/Room#: O819/B812-61  Discharging Unit Phone Number: [phone]    Emergency Contact:   Extended Emergency Contact Information  Primary Emergency Contact: Diana Villalba  Address: [place] [address], [address] [address] Phone: [phone]  Mobile Phone: [phone]  Relation: Spouse    Past Surgical History:  Past Surgical History:   Procedure Laterality Date    BACK SURGERY      CARDIAC CATHETERIZATION  2017    [facility] TUSHAR ECHOLS MD    CATARACT REMOVAL      COLONOSCOPY  2009    single polyp right colon    EYE SURGERY Bilateral     OTHER SURGICAL HISTORY  06/08/15    EXC SUBCU MASS BACK    TONSILLECTOMY         Immunization History:   Immunization History   Administered Date(s) Administered    Influenza 2013    Influenza Virus Vaccine 2014    Influenza, High Dose (Fluzone 65 yrs and older) 09/10/2015, 10/10/2016, 2017, 10/30/2018    Pneumococcal Conjugate 13-valent ([id number]) 2016    Pneumococcal Polysaccharide ([id number]) 2011    Tdap (Boostrix, Adacel) 2011       Active Problems:  Patient Active Problem List   Diagnosis Code    Hypertension I10    Aortic valve stenosis, severe I35.0    LVH (left ventricular hypertrophy) I51.7    Anemia G85.3    Diastolic dysfunction L38.53    Macular degeneration H35.30    Glaucoma H40.9    BPH (benign prostatic hyperplasia) N40.0    PD (Parkinson's disease) ([address] G20    MDS (myelodysplastic syndrome), low grade ([address] D46.20    Syncope and collapse R55    Closed fracture of body of lumbar vertebra ([address] S32.009A    Orthostasis I95.1    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 R26.9    Hematoma T14. 8XXA    BMI 22.0-22.9, adult Z68.22    Abnormality of gait and mobility R26.9    Acute cystitis without hematuria N30.00    Frequency of micturition R35.0    Closed fracture of one rib of right side with delayed healing S22.31XG       Isolation/Infection:   Isolation          No Isolation        Patient Infection Status     Infection Onset Added Last Indicated Last Indicated By Review Planned Expiration Resolved Resolved By    None active    Resolved    COVID-19 Rule Out 08/21/21 08/21/21 08/21/21 COVID-19, Rapid (Ordered)   08/21/21 Rule-Out Test Resulted          Nurse Assessment:  Last Vital Signs: /60   Pulse 66   Temp 98.2 °F (36.8 °C) (Oral)   Resp 18   Ht 6' (1.829 m)   Wt 160 lb (72.6 kg)   SpO2 97%   BMI 21.70 kg/m²     Last documented pain score (0-10 scale): Pain Level: 5  Last Weight:   Wt Readings from Last 1 Encounters:   08/21/21 160 lb (72.6 kg)     Mental Status:  oriented and alert    IV Access:  - None    Nursing Mobility/ADLs:  Walking   Assisted  Transfer  Assisted  Bathing  Independent  Dressing  Assisted  Toileting  Independent  Feeding  Independent  Med Admin  Assisted  Med Delivery   whole    Wound Care Documentation and Therapy:        Elimination:  Continence:   · Bowel: Yes  · Bladder: Yes  Urinary Catheter: None   Colostomy/Ileostomy/Ileal Conduit: No       Date of Last BM: 8-    Intake/Output Summary (Last 24 hours) at 8/23/2021 1652  Last data filed at 8/22/2021 1812  Gross per 24 hour   Intake 240 ml   Output --   Net 240 ml     I/O last 3 completed shifts:   In: 240 [P.O.:240]  Out: -     Safety Concerns:     History of Falls (last 30 days)

## 2021-08-23 NOTE — PROGRESS NOTES
Physical Therapy Med Surg Daily Treatment Note  Facility/Department: [facility]  [facility]       NAME: Mumtaz Cardona  : 1940 (71 y.o.)  MRN: [id number]  CODE STATUS: Full Code    Date of Service: 2021    Patient Diagnosis(es): Closed head injury, initial encounter [L83.26JZ]  Fall, initial encounter [W19. XXXA]  Closed fracture of one rib of right side with delayed healing [S22.31XG]  Closed fracture of one rib of right side, initial encounter [S22.31XA]  Pneumothorax, unspecified type [J93.9]  Pneumonia due to infectious organism, unspecified laterality, unspecified part of lung [J18.9]   Chief Complaint   Patient presents with    Fall     fall from standing last night.   presents with lower back pain and swelling to upper lip.  denies LOC, head or neck pain     Patient Active Problem List    Diagnosis Date Noted    Closed fracture of one rib of right side with delayed healing 2021    Acute cystitis without hematuria     Frequency of micturition     BMI 22.0-22.9, adult 2020    Abnormality of gait and mobility 2020    Hematoma     Orthostasis 2020    Gait abnormality due to Severe Parkinson's Disease with Blunt Chest Trauma; rehab admission 20. 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    Primary orthostatic hypotension 2018    Macular degeneration     Glaucoma     BPH (benign prostatic hyperplasia)     Anemia 2014    Aortic valve stenosis, severe 2013    LVH (left ventricular hypertrophy)     Diastolic dysfunction     Hypertension 2011 Past Medical History:   Diagnosis Date    Actinic keratoses     Aortic valve stenosis, severe 9/20/2013    BPH (benign prostatic hyperplasia)     Cancer [facility] 04/2018    skin cancer on chest removed    Colon polyp 90/70/9278    Diastolic dysfunction 69/67/2686    Stage 2 by echo    ED (erectile dysfunction) 11/29/2011    Glaucoma     Dr. Lily Huggins Hemochromatosis     History of echocardiogram 02/18/2015    mild AS, mild AR, mild TR, mild MR    [place]    Hypertension     Iron deficiency anemia 4/28/2014    LVH (left ventricular hypertrophy) 9/20/2013    Macular degeneration     bilateral; dry on left and wet on right-Dr. Eliu Kingston on back 5/14/2015    Osteoarthritis cervical spine     Parkinson disease ([facility]     Dr. Chloe Tran     Past Surgical History:   Procedure Laterality Date    BACK SURGERY      CARDIAC CATHETERIZATION  11/09/2017    [facility] TUSHAR ECHOLS MD    CATARACT REMOVAL      COLONOSCOPY  11/2009    single polyp right colon    EYE SURGERY Bilateral     OTHER SURGICAL HISTORY  06/08/15    EXC SUBCU MASS BACK    TONSILLECTOMY         Restrictions  Restrictions/Precautions: Fall Risk    SUBJECTIVE   General  Chart Reviewed: Yes  Family / Caregiver Present: No  Subjective  Subjective: \"im worried about my wife she's not here yet\"  General Comment  Comments: agreeable to have therapy but requested his wife to be called    Pre-Session Pain Report     Pain Screening  Patient Currently in Pain: Yes       Post-Session Pain Report  Pain Assessment  Pain Assessment: 0-10  Pain Level: 3  Pain Type: Acute pain  Pain Location: Rib cage  Pain Orientation: Right  Pain Descriptors: Aching  Pain Frequency: Intermittent (only hurts when i move)         OBJECTIVE   Orientation  Overall Orientation Status: Within Functional Limits    Bed mobility  Rolling to Left: Stand by assistance  Supine to Sit: Stand by assistance  Comment: very guarded movement.  didn't require assistance but was very taxing for pt to perform tasks. Transfers  Sit to Stand: Minimal Assistance  Stand to sit: Minimal Assistance    Ambulation  Ambulation?: Yes  Ambulation 1  Surface: level tile  Device: Rolling Walker  Assistance: Contact guard assistance;Minimal assistance  Quality of Gait: kyphosis, inconsistent steps, poor approach to chair  Gait Deviations: Slow Renetta; Increased ALEN; Decreased step length  Distance: 8 steps bed to chair    Stairs/Curb  Stairs?: No                                               ASSESSMENT pt agreeable to get up to chair with this therapist. Elias Tabaress his spouse and she didn't answer so he left her a message. He is c/o her not being here yet. He said staff was looking into this as well. Pt reports using a rollator at home. Discharge Recommendations:  Continue to assess pending progress    Goals  Long term goals  Long term goal 1: Pt will demonstrate bed mobility indep for return to PLOF. Long term goal 2: Pt will demonstrate transfers SBA with WW and with demonstration of safe handplacement 100% of the time for safe return home with wife. Long term goal 3: Pt will demonstrate amb SBA with WW 100ft to allow pt to safely amb inside his home. Long term goal 4: Pt will demonstrate stair negotiation 1 flight with B rails SBA to allow pt to go upstairs to bedroom and bathroom. Patient Goals   Patient goals : \"go home\"    PLAN    Times per week: 3-6        [facility] (6 CLICK) BASIC MOBILITY  AM-PAC Inpatient Mobility Raw Score : 13     Therapy Time   Individual   Time In  0940   Time Out 0955   Minutes 15   10 minutes bed mob/transfers  5 minutes gait          Seun Gorman PTA, 08/23/21 at 9:54 AM         Definitions for assistance levels  Independent = pt does not require any physical supervision or assistance from another person for activity completion. Device may be needed.   Stand by assistance = pt requires verbal cues or instructions from another person, close to but not touching, to perform the activity  Minimal assistance= pt performs 75% or more of the activity; assistance is required to complete the activity  Moderate assistance= pt performs 50% of the activity; assistance is required to complete the activity  Maximal assistance = pt performs 25% of the activity; assistance is required to complete the activity  Dependent = pt requires total physical assistance to accomplish the task

## 2021-08-23 NOTE — PROGRESS NOTES
Assessment completed, patient alert and oriented x 4 but confusion noted at times, lungs diminished, heart rate regular but a murmur is noted, room air, bowel sounds active, last bm 8-21, no edema noted, briusing on bilateral arms, knees, top lip and left foot, abrasion on the left shin and left elbow, dressing reapplied to the left elbow after cleansing the site, patient sitting up in the chair, complaining of lightheaded and dizziness, assisted the patient back into bed, vitals obtained, 93/56, will call trauma to make them aware

## 2021-08-23 NOTE — CARE COORDINATION
Met with pt and wife at the bedside to assess for d/c with given dx of rib fx. Pt. And wife both feel he will do much better at home and will be more apt to move around. Discussed risks involved with inactivity. He and wife state they have an existing PT/OT order from their neurologist \"Dr. Mathew Jordan" for his \"Parkinson's\" and pt can go more often than once a week, he just refuses to. Does have walker at home wife in process of getting shower chair and offered to obtain prescription for him h she states he has \"particular about what he gets. \"Suggested laverne, Alisa 77 for in home PT both refused \"We have done that before and he hated it\" Wife states she can manage at home. Per Wale Rizzo PT pt cannot attempt stairs at this time he is hypotensive and getting a liter of fluids. They deny need for PT/OT order.   Electronically signed by Keyona Calderón RN on 8/23/2021 at 1:13 PM

## 2021-08-23 NOTE — PROGRESS NOTES
Physical Therapy Med Surg Daily Treatment Note  Facility/Department: [address] [place] [address] [address]  Room: [id number]       NAME: Juliette Jarvis  : 1940 (9 y.o.)  MRN: [id number]  CODE STATUS: Full Code    Date of Service: 2021    Patient Diagnosis(es): Closed head injury, initial encounter [Q35.85OP]  Fall, initial encounter [W19. XXXA]  Closed fracture of one rib of right side with delayed healing [S22.31XG]  Closed fracture of one rib of right side, initial encounter [S22.31XA]  Pneumothorax, unspecified type [J93.9]  Pneumonia due to infectious organism, unspecified laterality, unspecified part of lung [J18.9]   Chief Complaint   Patient presents with    Fall     fall from standing last night.   presents with lower back pain and swelling to upper lip.  denies LOC, head or neck pain     Patient Active Problem List    Diagnosis Date Noted    Closed fracture of one rib of right side with delayed healing 2021    Acute cystitis without hematuria     Frequency of micturition     BMI 22.0-22.9, adult 2020    Abnormality of gait and mobility 2020    Hematoma     Orthostasis 2020    Gait abnormality due to Severe Parkinson's Disease with Blunt Chest Trauma; rehab admission 20. 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    Primary orthostatic hypotension 2018    Macular degeneration     Glaucoma     BPH (benign prostatic hyperplasia)     Anemia 2014    Aortic valve stenosis, severe 2013    LVH (left ventricular hypertrophy)     Diastolic dysfunction     Hypertension 2011 Past Medical History:   Diagnosis Date    Actinic keratoses     Aortic valve stenosis, severe 9/20/2013    BPH (benign prostatic hyperplasia)     Cancer ([facility] 04/2018    skin cancer on chest removed    Colon polyp 56/98/3452    Diastolic dysfunction 37/76/3703    Stage 2 by echo    ED (erectile dysfunction) 11/29/2011    Glaucoma     Dr. Andrez Hays Hemochromatosis     History of echocardiogram 02/18/2015    mild AS, mild AR, mild TR, mild MR    [place]    Hypertension     Iron deficiency anemia 4/28/2014    LVH (left ventricular hypertrophy) 9/20/2013    Macular degeneration     bilateral; dry on left and wet on right-Dr. Lowell Hinton on back 5/14/2015    Osteoarthritis cervical spine     Parkinson disease ([facility]     Dr. Vince Mcgarry     Past Surgical History:   Procedure Laterality Date    BACK SURGERY      CARDIAC CATHETERIZATION  11/09/2017    [facility] T[facility]AR ECHOLS MD    CATARACT REMOVAL      COLONOSCOPY  11/2009    single polyp right colon    EYE SURGERY Bilateral     OTHER SURGICAL HISTORY  06/08/15    EXC SUBCU MASS BACK    TONSILLECTOMY         Restrictions  Restrictions/Precautions: Fall Risk    SUBJECTIVE   General  Chart Reviewed: Yes  Family / Caregiver Present: No  Subjective  Subjective: \"im worried about my wife she's not here yet\"  General Comment  Comments: agreeable to have therapy but requested his wife to be called    Pre-Session Pain Report     Pain Screening  Patient Currently in Pain: Yes       Post-Session Pain Report  Pain Assessment  Pain Assessment: 0-10  Pain Level: 3  Pain Type: Acute pain  Pain Location: Rib cage  Pain Orientation: Right  Pain Descriptors: Aching  Pain Frequency: Intermittent (only hurts when i move)         OBJECTIVE   Orientation  Overall Orientation Status: Within Functional Limits    Bed mobility  Rolling to Left: Stand by assistance  Supine to Sit: Stand by assistance  Comment: very guarded movement.  didn't Times per week: 3-6        [facility] (6 CLICK) BASIC MOBILITY  AM-PAC Inpatient Mobility Raw Score : 13    Therapy Time   Individual   Time In 1450   Time Out 1513   Minutes 23   10 minutes gt/steps  13 minutes bed mob/transfers          [facility] JAYCEE SWIFT PTA, 08/23/21 at 3:14 PM         Definitions for assistance levels  Independent = pt does not require any physical supervision or assistance from another person for activity completion. Device may be needed.   Stand by assistance = pt requires verbal cues or instructions from another person, close to but not touching, to perform the activity  Minimal assistance= pt performs 75% or more of the activity; assistance is required to complete the activity  Moderate assistance= pt performs 50% of the activity; assistance is required to complete the activity  Maximal assistance = pt performs 25% of the activity; assistance is required to complete the activity  Dependent = pt requires total physical assistance to accomplish the task

## 2021-08-24 PROBLEM — G89.11 ACUTE PAIN DUE TO TRAUMA: Status: ACTIVE | Noted: 2021-08-21

## 2021-08-24 PROBLEM — S27.0XXA TRAUMATIC PNEUMOTHORAX: Status: ACTIVE | Noted: 2021-08-21
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
## 2021-08-24 NOTE — PROGRESS NOTES
Physical Therapy  Facility/Department: [facility] MED SURG R066/V535-75  Physical Therapy Discharge      NAME: Lizzy Parks    : 1940 (85 y.o.)  MRN: [id number]    Account: [de-identified]  Gender: male      Patient has been discharged from acute care hospital. DC patient from current PT program.      Electronically signed by Chava Bernal PT on 21 at 4:28 PM EDT

## 2021-08-26 LAB
BLOOD CULTURE, ROUTINE: NORMAL
CULTURE, BLOOD 2: NORMAL

## 2021-09-21 PROBLEM — Z85.820 HISTORY OF MELANOMA: Status: ACTIVE | Noted: 2018-06-27

## 2021-09-21 PROBLEM — H35.30 MACULAR DEGENERATION, BILATERAL: Status: ACTIVE | Noted: 2021-04-16

## 2021-09-21 PROBLEM — Z86.010 HISTORY OF COLONIC POLYPS: Status: ACTIVE | Noted: 2021-04-16

## 2021-09-21 PROBLEM — E53.8 VITAMIN B12 DEFICIENCY: Status: ACTIVE | Noted: 2021-04-16

## 2021-09-21 PROBLEM — H40.003 GLAUCOMA SUSPECT OF BOTH EYES: Status: ACTIVE | Noted: 2021-04-16

## 2021-09-21 PROBLEM — E83.110 HEREDITARY HEMOCHROMATOSIS (HCC): Status: ACTIVE | Noted: 2021-04-16

## 2021-09-21 PROBLEM — J30.81 ALLERGIC TO CATS: Status: ACTIVE | Noted: 2021-04-16

## 2021-09-21 PROBLEM — E53.8 FOLATE DEFICIENCY: Status: ACTIVE | Noted: 2021-04-16

## 2021-10-22 ENCOUNTER — OFFICE VISIT (OUTPATIENT)
Dept: UROLOGY | Age: 81
End: 2021-10-22
Payer: MEDICARE

## 2021-10-22 VITALS
HEIGHT: 72 IN | DIASTOLIC BLOOD PRESSURE: 70 MMHG | OXYGEN SATURATION: 98 % | WEIGHT: 153 LBS | SYSTOLIC BLOOD PRESSURE: 126 MMHG | BODY MASS INDEX: 20.72 KG/M2 | HEART RATE: 86 BPM

## 2021-10-22 DIAGNOSIS — N40.1 BPH WITH OBSTRUCTION/LOWER URINARY TRACT SYMPTOMS: Primary | ICD-10-CM

## 2021-10-22 DIAGNOSIS — R35.0 FREQUENCY OF MICTURITION: ICD-10-CM

## 2021-10-22 DIAGNOSIS — N13.8 BPH WITH OBSTRUCTION/LOWER URINARY TRACT SYMPTOMS: Primary | ICD-10-CM

## 2021-10-22 PROCEDURE — G8484 FLU IMMUNIZE NO ADMIN: HCPCS | Performed by: UROLOGY

## 2021-10-22 PROCEDURE — G8427 DOCREV CUR MEDS BY ELIG CLIN: HCPCS | Performed by: UROLOGY

## 2021-10-22 PROCEDURE — 1123F ACP DISCUSS/DSCN MKR DOCD: CPT | Performed by: UROLOGY

## 2021-10-22 PROCEDURE — G8420 CALC BMI NORM PARAMETERS: HCPCS | Performed by: UROLOGY

## 2021-10-22 PROCEDURE — 99213 OFFICE O/P EST LOW 20 MIN: CPT | Performed by: UROLOGY

## 2021-10-22 PROCEDURE — 1036F TOBACCO NON-USER: CPT | Performed by: UROLOGY

## 2021-10-22 PROCEDURE — 51798 US URINE CAPACITY MEASURE: CPT | Performed by: UROLOGY

## 2021-10-22 PROCEDURE — 4040F PNEUMOC VAC/ADMIN/RCVD: CPT | Performed by: UROLOGY

## 2021-10-22 ASSESSMENT — ENCOUNTER SYMPTOMS: ABDOMINAL PAIN: 0

## 2021-10-22 NOTE — PROGRESS NOTES
Subjective:      Patient ID: Bonnye Councilman is a 79 y.o. male    HPI  This is an 80 yo male with Parkinson's dz with gait compromise, AV stenosis, HTN, OA, BPH and back in follow-up with frequency and urgency related to Parkinson's. Since last seen on 10/20/20, he has no new complaints and remains on Proscar and Ditropan 5 mg Xl. He is still having frequency and some urgency but no UI and remains stable. He did have a fall in 8/21 and sustained a rib fracture. He has no SE from these medications. He is here with his wife and he has no other new complaints.  He voided prior to the visit.     Past Medical History:   Diagnosis Date    Actinic keratoses     Aortic valve stenosis, severe 9/20/2013    BPH (benign prostatic hyperplasia)     Cancer ([facility] 04/2018    skin cancer on chest removed    Colon polyp 18/56/0743    Diastolic dysfunction 86/30/4977    Stage 2 by echo    ED (erectile dysfunction) 11/29/2011    Glaucoma     Dr. Fair Lower Hemochromatosis     History of echocardiogram 02/18/2015    mild AS, mild AR, mild TR, mild MR    [place]    Hypertension     Iron deficiency anemia 4/28/2014    LVH (left ventricular hypertrophy) 9/20/2013    Macular degeneration     bilateral; dry on left and wet on right-Dr. Dain Maloney on back 5/14/2015    Osteoarthritis cervical spine     Parkinson disease ([facility]     Dr. Theodore Arnold     Past Surgical History:   Procedure Laterality Date    BACK SURGERY      CARDIAC CATHETERIZATION  11/09/2017    [facility] T[facility]OL[facility] MD    CATARACT REMOVAL      COLONOSCOPY  11/2009    single polyp right colon    EYE SURGERY Bilateral     OTHER SURGICAL HISTORY  06/08/15    EXC SUBCU MASS BACK    TONSILLECTOMY       Social History     Socioeconomic History    Marital status:      Spouse name: None    Number of children: 3    Years of education: None    Highest education level: None   Occupational History    Occupation: retired 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   Tobacco Use    Smoking status: Never Smoker    Smokeless tobacco: Never Used   Vaping Use    Vaping Use: Never assessed   Substance and Sexual Activity    Alcohol use: Yes     Comment: rare    Drug use: No    Sexual activity: Not Currently     Partners: Female   Other Topics Concern    None   Social History Narrative    Lives with wife. She is in good health and she still drives. He is retired. He has 3 children all of which live out of the area one in [address] [place] [address] 1 in [address] [place] [address] and one in the [facility]. 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 Pt reports high level of indep prior to admission. Social Determinants of Health     Financial Resource Strain:     Difficulty of Paying Living Expenses:    Food Insecurity:     Worried About Running Out of Food in the Last Year:     [address] in the Last Year:    Transportation Needs:     Lack of Transportation (Medical):      Lack of Transportation (Non-Medical):    Physical Activity:     Days of Exercise per Week:     Minutes of Exercise per Session:    Stress:     Feeling of Stress :    Social Connections:     Frequency of Communication with Friends and Family:     Frequency of Social Gatherings with Friends and Family:     Attends Confucianist Services:     Active Member of Clubs or Organizations:     Attends Club or Organization Meetings:     Marital Status:    Intimate Partner Violence:     Fear of Current or Ex-Partner:     Emotionally Abused:     Physically Abused:     Sexually Abused:      History reviewed. No pertinent family history. Current Outpatient Medications   Medication Sig Dispense Refill    midodrine (PROAMATINE) 5 MG tablet Take 5 mg by mouth 3 times daily      linaclotide (LINZESS) 290 MCG CAPS capsule Take 290 mcg by mouth every morning (before breakfast)      lactobacillus acidophilus (FLORANEX) Take 4 tablets by mouth 3 times daily      oxybutynin (DITROPAN XL) 5 MG extended release tablet Take 1 tablet by mouth daily 90 tablet 3    lactobacillus acidophilus (FLORANEX) Take 2 tablets by mouth 3 times daily 120 tablet 1    finasteride (PROSCAR) 5 MG tablet Take 1 tablet by mouth daily 30 tablet 3    Cholecalciferol (VITAMIN D3) 2000 units CAPS Take 1,000 Units by mouth daily      vitamin B-12 (CYANOCOBALAMIN) 1000 MCG tablet Take 1,000 mcg by mouth daily Every other day.  Dorzolamide HCl-Timolol Mal (COSOPT OP) Apply 1 drop to eye 2 times daily      folic acid (FOLVITE) 967 MCG tablet Take 1 tablet by mouth daily       carbidopa-levodopa (SINEMET)  MG per tablet Take 1 tablet by mouth 3 times daily      bimatoprost (LUMIGAN) 0.03 % ophthalmic drops Place 1 drop into the left eye nightly.  acetaminophen (TYLENOL) 325 MG tablet Take 2 tablets by mouth every 6 hours as needed for Pain 112 tablet 0     No current facility-administered medications for this visit. Cat hair extract  reviewed      Review of Systems   Constitutional: Negative for fever and unexpected weight change. Gastrointestinal: Negative for abdominal pain. Genitourinary: Negative for dysuria, enuresis, flank pain and hematuria. Objective:   Physical Exam  Constitutional:       Appearance: Normal appearance.    Abdominal:      General: There is no distension. Palpations: Abdomen is soft. Tenderness: There is no abdominal tenderness. There is no guarding. Neurological:      Mental Status: He is alert. Psychiatric:         Mood and Affect: Mood normal.        post void residual by U/S: 15 cc    Assessment: This is an 80 yo male with Parkinson's dz, AV stenosis, HTN, OA, BPH and back in follow-up with frequency and urgency related to Parkinson's and is emptying the bladder by PVR. Will continue with present management. Plan:      1.  F/u 1 yr for Flow/PVR        Jim Weaver MD

## 2021-11-12 PROBLEM — N30.00 ACUTE CYSTITIS WITHOUT HEMATURIA: Status: ACTIVE | Noted: 2021-10-11

## 2021-11-12 PROBLEM — H40.9 GLAUCOMA: Status: ACTIVE | Noted: 2021-10-11

## 2021-11-12 PROBLEM — H35.30 MACULAR DEGENERATION: Status: ACTIVE | Noted: 2021-10-11

## 2021-11-12 PROBLEM — T14.8XXA HEMATOMA: Status: ACTIVE | Noted: 2021-10-11

## 2021-11-12 PROBLEM — R01.1 MURMUR: Status: ACTIVE | Noted: 2018-05-04

## 2021-11-12 PROBLEM — R35.0 FREQUENCY OF MICTURITION: Status: ACTIVE | Noted: 2021-10-11

## 2021-11-15 ENCOUNTER — OFFICE VISIT (OUTPATIENT)
Dept: NEUROLOGY | Age: 81
End: 2021-11-15
Payer: MEDICARE

## 2021-11-15 VITALS
SYSTOLIC BLOOD PRESSURE: 122 MMHG | DIASTOLIC BLOOD PRESSURE: 66 MMHG | BODY MASS INDEX: 20.89 KG/M2 | WEIGHT: 154 LBS | HEART RATE: 83 BPM

## 2021-11-15 DIAGNOSIS — R29.6 FALLS FREQUENTLY: ICD-10-CM

## 2021-11-15 DIAGNOSIS — I95.1 PRIMARY ORTHOSTATIC HYPOTENSION: ICD-10-CM

## 2021-11-15 DIAGNOSIS — G26 EXTRAPYRAMIDAL AND MOVEMENT DISORDERS IN DISEASES CLASSIFIED ELSEWHERE: ICD-10-CM

## 2021-11-15 DIAGNOSIS — R26.9 ABNORMALITY OF GAIT AND MOBILITY: ICD-10-CM

## 2021-11-15 DIAGNOSIS — G20 PD (PARKINSON'S DISEASE) (HCC): Primary | ICD-10-CM

## 2021-11-15 DIAGNOSIS — R25.8 BRADYKINESIA: ICD-10-CM

## 2021-11-15 PROCEDURE — 1123F ACP DISCUSS/DSCN MKR DOCD: CPT | Performed by: PSYCHIATRY & NEUROLOGY

## 2021-11-15 PROCEDURE — 4040F PNEUMOC VAC/ADMIN/RCVD: CPT | Performed by: PSYCHIATRY & NEUROLOGY

## 2021-11-15 PROCEDURE — G8427 DOCREV CUR MEDS BY ELIG CLIN: HCPCS | Performed by: PSYCHIATRY & NEUROLOGY

## 2021-11-15 PROCEDURE — G8484 FLU IMMUNIZE NO ADMIN: HCPCS | Performed by: PSYCHIATRY & NEUROLOGY

## 2021-11-15 PROCEDURE — 99214 OFFICE O/P EST MOD 30 MIN: CPT | Performed by: PSYCHIATRY & NEUROLOGY

## 2021-11-15 PROCEDURE — 1036F TOBACCO NON-USER: CPT | Performed by: PSYCHIATRY & NEUROLOGY

## 2021-11-15 PROCEDURE — G8420 CALC BMI NORM PARAMETERS: HCPCS | Performed by: PSYCHIATRY & NEUROLOGY

## 2021-11-15 ASSESSMENT — ENCOUNTER SYMPTOMS
CHOKING: 0
VOMITING: 0
COLOR CHANGE: 0
TROUBLE SWALLOWING: 0
NAUSEA: 0
PHOTOPHOBIA: 0
SHORTNESS OF BREATH: 0
BACK PAIN: 0

## 2021-12-13 DIAGNOSIS — N13.8 BPH WITH OBSTRUCTION/LOWER URINARY TRACT SYMPTOMS: Primary | ICD-10-CM

## 2021-12-13 DIAGNOSIS — N40.1 BPH WITH OBSTRUCTION/LOWER URINARY TRACT SYMPTOMS: Primary | ICD-10-CM

## 2021-12-13 RX ORDER — OXYBUTYNIN CHLORIDE 5 MG/1
TABLET, EXTENDED RELEASE ORAL
Qty: 90 TABLET | Refills: 3 | Status: SHIPPED | OUTPATIENT
Start: 2021-12-13

## 2021-12-13 RX ORDER — FINASTERIDE 5 MG/1
5 TABLET, FILM COATED ORAL DAILY
Qty: 90 TABLET | Refills: 3 | Status: SHIPPED | OUTPATIENT
Start: 2021-12-13

## 2022-07-29 PROBLEM — R93.1 ECHOCARDIOGRAM ABNORMAL: Status: ACTIVE | Noted: 2021-11-01

## 2022-08-08 ENCOUNTER — OFFICE VISIT (OUTPATIENT)
Dept: NEUROLOGY | Age: 82
End: 2022-08-08
Payer: MEDICARE

## 2022-08-08 VITALS
BODY MASS INDEX: 20.18 KG/M2 | SYSTOLIC BLOOD PRESSURE: 120 MMHG | DIASTOLIC BLOOD PRESSURE: 82 MMHG | HEIGHT: 72 IN | HEART RATE: 89 BPM | WEIGHT: 149 LBS

## 2022-08-08 DIAGNOSIS — R29.6 FALLS FREQUENTLY: ICD-10-CM

## 2022-08-08 DIAGNOSIS — R26.0 ATAXIC GAIT: ICD-10-CM

## 2022-08-08 DIAGNOSIS — R26.89 BALANCE DISORDER: ICD-10-CM

## 2022-08-08 DIAGNOSIS — G20 PD (PARKINSON'S DISEASE) (HCC): Primary | ICD-10-CM

## 2022-08-08 DIAGNOSIS — I95.1 PRIMARY ORTHOSTATIC HYPOTENSION: ICD-10-CM

## 2022-08-08 PROCEDURE — G8420 CALC BMI NORM PARAMETERS: HCPCS | Performed by: PSYCHIATRY & NEUROLOGY

## 2022-08-08 PROCEDURE — 99214 OFFICE O/P EST MOD 30 MIN: CPT | Performed by: PSYCHIATRY & NEUROLOGY

## 2022-08-08 PROCEDURE — G8427 DOCREV CUR MEDS BY ELIG CLIN: HCPCS | Performed by: PSYCHIATRY & NEUROLOGY

## 2022-08-08 PROCEDURE — 1036F TOBACCO NON-USER: CPT | Performed by: PSYCHIATRY & NEUROLOGY

## 2022-08-08 PROCEDURE — 1123F ACP DISCUSS/DSCN MKR DOCD: CPT | Performed by: PSYCHIATRY & NEUROLOGY

## 2022-08-08 ASSESSMENT — ENCOUNTER SYMPTOMS
BACK PAIN: 0
PHOTOPHOBIA: 0
SHORTNESS OF BREATH: 0
NAUSEA: 0
TROUBLE SWALLOWING: 0
CHOKING: 0
COLOR CHANGE: 0
VOMITING: 0

## 2022-08-08 NOTE — PROGRESS NOTES
Subjective:      Patient ID: Linda Warren is a 79 y.o. male who presents today for:  Chief Complaint   Patient presents with    Follow-up     Parkinson's Disease  , patient states hes doing well, no concerns at this time. HPI 50-year-old right-handed female with a history Parkinson's disease. Patient recently has not any falls or hospitalizations and denies hallucinations or dyskinesias. Neck she is doing well and optimally treated with the medication he is on. Is not developed any hallucinations or slowdown in his walking. He denies any difficulty swallowing. He does have constipation and he has Linzess if he needs. He denies any neck pain or back pain. He denies any memory issues.     Past Medical History:   Diagnosis Date    Actinic keratoses     Aortic valve stenosis, severe 9/20/2013    BPH (benign prostatic hyperplasia)     Cancer ([facility] [address] 04/2018    skin cancer on chest removed    Colon polyp 11/53/0569    Diastolic dysfunction 80/51/0849    Stage 2 by echo    ED (erectile dysfunction) 11/29/2011    Glaucoma     Dr. Patricia Seymour    Hemochromatosis     History of echocardiogram 02/18/2015    mild AS, mild AR, mild TR, mild MR    [place]    Hypertension     Iron deficiency anemia 4/28/2014    LVH (left ventricular hypertrophy) 9/20/2013    Macular degeneration     bilateral; dry on left and wet on right-Dr. Jaspreet Parson    Mass on back 5/14/2015    Osteoarthritis cervical spine     Parkinson disease ([facility] [address]     Dr. 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 She is in good health and she still drives. He is retired. He has 3 children all of which live out of the area one in [facility] 1 in [place] and one in the [facility]. 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 Pt reports high level of indep prior to admission. Social Determinants of Health     Financial Resource Strain: Not on file   Food Insecurity: Not on file   Transportation Needs: Not on file   Physical Activity: Not on file   Stress: Not on file   Social Connections: Not on file   Intimate Partner Violence: Not on file   Housing Stability: Not on file     History reviewed. No pertinent family history.   Allergies   Allergen Reactions    Cat Hair Extract Itching and Swelling     cats       Current Outpatient Medications   Medication Sig Dispense Refill    oxybutynin (DITROPAN-XL) 5 MG extended release tablet TAKE 1 TABLET DAILY 90 tablet 3    finasteride (PROSCAR) 5 MG tablet Take 1 tablet by mouth daily 90 tablet 3    midodrine (PROAMATINE) 5 MG tablet Take 5 mg by mouth 3 times daily      linaclotide (LINZESS) 290 MCG CAPS capsule Take 290 mcg by mouth every morning (before breakfast)      lactobacillus acidophilus (FLORANEX) Take 2 tablets by mouth 3 times daily 120 tablet 1    Cholecalciferol (VITAMIN D3) 2000 units CAPS Take 1,000 Units by mouth daily      vitamin B-12 (CYANOCOBALAMIN) 1000 MCG tablet Take 1,000 mcg by mouth daily Every other day. Dorzolamide HCl-Timolol Mal (COSOPT OP) Apply 1 drop to eye 2 times daily      folic acid (FOLVITE) 665 MCG tablet Take 1 tablet by mouth daily       carbidopa-levodopa (SINEMET)  MG per tablet Take 1 tablet by mouth 3 times daily      bimatoprost (LUMIGAN) 0.03 % ophthalmic drops Place 1 drop into the left eye nightly. acetaminophen (TYLENOL) 325 MG tablet Take 2 tablets by mouth every 6 hours as needed for Pain (Patient not taking: Reported on 8/8/2022) 112 tablet 0     No current facility-administered medications for this visit. Review of Systems   Constitutional:  Negative for fever. HENT:  Negative for ear pain, tinnitus and trouble swallowing. Eyes:  Negative for photophobia and visual disturbance. Respiratory:  Negative for choking and shortness of breath. Cardiovascular:  Negative for chest pain and palpitations. Gastrointestinal:  Negative for nausea and vomiting. Musculoskeletal:  Negative for back pain, gait problem, joint swelling, myalgias, neck pain and neck stiffness. Skin:  Negative for color change. Allergic/Immunologic: Negative for food allergies. Neurological:  Negative for dizziness, tremors, seizures, syncope, facial asymmetry, speech difficulty, weakness, light-headedness, numbness and headaches.    Psychiatric/Behavioral:  Negative for behavioral problems, confusion, hallucinations and sleep disturbance. Objective:   /82 (Site: Left Upper Arm, Position: Sitting, Cuff Size: Medium Adult)   Pulse 89   Ht 6' (1.829 m)   Wt 149 lb (67.6 kg)   BMI 20.21 kg/m²     Physical Exam    CT Head WO Contrast    Result Date: 8/21/2021  CT HEAD WO CONTRAST CLINICAL HISTORY:  fall COMPARISON: June 20, 2020 TECHNIQUE: Multiple unenhanced serial axial images of the brain from the vertex of the skull to the base of the skull were performed. FINDINGS: The ventricles are dilated. Unchanged size configuration. .  No mass. No midline shift. The cisterns are patent. There are white matter and periventricular changes most likely consistent with chronic small vessel disease. No acute intra-axial or extra-axial findings. The visualized osseous structures are unremarkable. There is some patchy opacification the ethmoids. There is mild to moderate mucoperiosteal thickening of the left maxillary sinus. The mastoids are well aerated. Both globes are intact. No gross preseptal or post septal findings. Note is made of a left periocular device. Correlate with patient  surgical history. NO ACUTE INTRA-AXIAL OR EXTRA-AXIAL FINDINGS. All CT scans at this facility use dose modulation, iterative reconstruction, and/or weight based dosing when appropriate to reduce radiation dose to as low as reasonably achievable. CT FACIAL BONES WO CONTRAST    Result Date: 8/21/2021  EXAMINATION: CT FACIAL BONES WO CONTRAST DATE AND TIME:8/21/2021 12:09 PM CLINICAL HISTORY: Acute trauma. Facial pain  fall  COMPARISON: None Technique: Axial 2mm thick contiguous scans of the facial bones and paranasal sinuses were acquired. Coronal and sagittal reformats were generated and reviewed. All CT scans at this facility use dose modulation, iterative reconstruction, and/or weight based dosing when appropriate to reduce radiation dose to as low as reasonably achievable.  FINDINGS       Osseous structures: No acute nasal fracture or significant septal deviation. The remaining osseous structures about the mid face and orbits including the maxilla, zygoma, mandibular, palatine, sphenoid, lacrimal, and orbital bones are intact without fracture or subluxation. The mandible and TMJs are intact. Orbits: Both globes, extraocular muscles, optic nerves and retrobulbar fat appears normal.       Paranasal sinuses: Mucosal thickening left maxillary sinus. NO EVIDENCE FOR ACUTE FRACTURE OR DISLOCATION OF THE FACIAL BONES      CT CHEST WO CONTRAST    Result Date: 8/21/2021  EXAMINATION: CT CHEST WO CONTRAST DATE:8/21/2021 12:09 PM CLINICAL HISTORY: Anterior chest pain. Shortness of breath. Fall, R rib pain  COMPARISON:  June 29, 4421 TECHNIQUE: Helical CT was performed through the chest without IV contrast., All CT scans at this facility use dose modulation, iterative reconstruction, and/or weight based dosing when appropriate to reduce radiation dose to as low as reasonably achievable. Some of this report was completed using Power Scribe 388 [id number] and may include unintended errors with respect to translation of words, typographical errors or grammatical errors which may not have been identified prior to the finalization of this report. FINDINGS:   Lungs: Minimal right base infiltrate/atelectasis. Tiny dependent changes at the left base. Lung fields otherwise clear. Tiny 1-2% right pneumothorax. There is an acute undisplaced fracture through the posterior right ninth rib. Pleura: Small right effusion. Mediastinum :Mediastinum and danilo are unremarkable. Vessels:Thoracic aorta is intact. Bones: Acute right ninth rib fracture. Other:None     UNDISPLACED POSTERIOR RIGHT NINTH RIB FRACTURE TINY 1-2% RIGHT PNEUMOTHORAX. MINIMAL PLEURAL PROBABLE CHANGES OF THE RIGHT BASE.      CT CERVICAL SPINE WO CONTRAST    Result Date: 8/21/2021  EXAMINATION: CT CERVICAL SPINE WO CONTRAST   DATE AND TIME:8/21/2021 11:25 AM CLINICAL HISTORY:Acute posterior neck pain  fall  COMPARISON: April 17, 8128 TECHNIQUE:Helical scanning was performed from the skull base through the remainder of the cervical spine without intravenous contrast. Sagittal and coronal reformats were obtained. The lack of contrast limits CT sensitivity of the soft tissues. All CT scans at this facility use dose modulation, iterative reconstruction, and/or weight based dosing when appropriate to reduce radiation dose to as low as reasonably achievable. FINDINGS   Fracture:No acute fracture. Alignment:  Normal cervical lordosis. No subluxation. No canal stenosis. Dense atlas:Dens atlas relationship is normal. Soft tissues:Negative. Other findings: There is moderate cervical spondylosis with multiple level disc osteophyte changes. The visualized portion of the lung apices show a small right apical pneumothorax. Incidental partially visualized is a small right apical pneumothorax. No acute cervical spine abnormality. CT ABDOMEN PELVIS W IV CONTRAST Additional Contrast? None    Result Date: 8/21/2021  Examination: CT ABDOMEN PELVIS W IV CONTRAST Indication:   fall Technique: Multiple serial axial images was performed through the abdomen and pelvis utilizing 100cc of Isovue 300. Images were reconstructed in the axial and coronal and sagittal planes. Comparison: June 20, 2020. Findings: There are small bibasilar areas atelectasis, patchy infiltrate in the bases right greater than left. There is small bilateral pleural effusions right greater than left. The liver again shows several areas low-attenuation. The largest within the right lobe at the inferolateral aspect measures 3.4 cm. The largest in the left lobe measures 2.6 cm. Likely hepatic cyst. No other focal parenchymal abnormalities. No intrahepatic biliary dilatation.  The Gallbladder, spleen, pancreas, adrenals, are unremarkable. The right kidney shows no significant perinephric stranding. There is an area of low-attenuation is seen within the anterior interpole region of the right kidney measuring 1.9 cm. Probable right parapelvic cyst again noted. There is a small area of low-attenuation seen in the anterior pole region too small to characterize. No hydronephrosis. The left kidney shows a partially exophytic area at the lateral region. 2. 0.1 cm unchanged. There are small areas low-attenuation seen in the anterior pole region and posterior pole region. Unchanged. Likely renal cysts. No hydronephrosis. No bladder calculi. Large and small bowel show no sign of obstruction. The appendix is not visualized. No pericecal stranding. No diverticulitis. No free air. No free fluid. The visualized abdominal aorta is of normal size and caliber. No significant retroperitoneal adenopathy. There is multilevel degenerative changes of the lumbar spine. The field-of-view there is a fracture of the right posterior ninth rib. 1. SMALL BIBASILAR AREAS ATELECTASIS, PATCHY GROUNDGLASS INFILTRATE WITH TRACE TO SMALL BILATERAL PLEURAL EFFUSIONS. 2. MULTIPLE HEPATIC CYST. 3. BILATERAL RENAL CYSTS AS WELL AS RIGHT PARAPELVIC CYSTS. 4. FRACTURE THE POSTERIOR ASPECT OF THE RIGHT NINTH RIB. All CT scans at this facility use dose modulation, iterative reconstruction, and/or weight based dosing when appropriate to reduce radiation dose to as low as reasonably achievable. XR CHEST PORTABLE    Result Date: 8/22/2021  EXAMINATION: XR CHEST PORTABLE. DATE AND TIME:8/22/2021 5:48 AM CLINICAL HISTORY: Shortness of breath   reevaluate right sided PTX (tiny)  COMPARISONS: August 21, 2021  FINDINGS: Equivocal tiny right apical pneumothorax of less than 2%. Minimal patchy opacity at the right base. No significant change. Lungs otherwise clear. No significant change.      XR CHEST PORTABLE    Result Date: 8/21/2021  EXAMINATION: CHEST PORTABLE VIEW CLINICAL HISTORY:  Right pneumothorax  COMPARISONS: CT scan chest August 21, 2021  FINDINGS: 2 views of the chest is submitted. The cardiac silhouette is of normal size configuration. Pulmonary vascular unremarkable. Right sided trachea. Areas atelectasis, patchy infiltrate right lower lobe. Blunting of the right costophrenic likely reflecting trace pleural. Trace right apical pneumothorax                                                                                   AREAS ATELECTASIS, PATCHY INFILTRATE RIGHT LOWER LOBE. BLUNTING OF THE RIGHT COSTOPHRENIC LIKELY REFLECTING TRACE PLEURAL.  TRACE RIGHT APICAL PNEUMOTHORAX      Lab Results   Component Value Date/Time    WBC 4.6 08/23/2021 09:11 AM    RBC 2.37 08/23/2021 09:11 AM    RBC 3.79 12/01/2011 02:03 PM    HGB 8.6 08/23/2021 09:11 AM    HCT 25.4 08/23/2021 09:11 AM    .9 08/23/2021 09:11 AM    MCH 36.2 08/23/2021 09:11 AM    MCHC 33.9 08/23/2021 09:11 AM    RDW 16.0 08/23/2021 09:11 AM    PLT 92 08/23/2021 09:11 AM    MPV 8.1 07/06/2015 08:21 AM     Lab Results   Component Value Date/Time     08/23/2021 09:11 AM    K 3.6 08/23/2021 09:11 AM    CL 99 08/23/2021 09:11 AM    CO2 25 08/23/2021 09:11 AM    BUN 18 08/23/2021 09:11 AM    CREATININE 0.73 08/23/2021 09:11 AM    GFRAA >60.0 08/23/2021 09:11 AM    LABGLOM >60.0 08/23/2021 09:11 AM    GLUCOSE 107 08/23/2021 09:11 AM    GLUCOSE 81 12/01/2011 02:03 PM    PROT 5.9 08/21/2021 10:45 AM    LABALBU 3.8 08/21/2021 10:45 AM    LABALBU 4.5 12/01/2011 02:03 PM    CALCIUM 7.9 08/23/2021 09:11 AM    BILITOT 0.7 08/21/2021 10:45 AM    ALKPHOS 49 08/21/2021 10:45 AM    AST 15 08/21/2021 10:45 AM    ALT <5 08/21/2021 10:45 AM     Lab Results   Component Value Date/Time    PROTIME 12.9 08/21/2021 12:45 PM    INR 1.0 08/21/2021 12:45 PM     Lab Results   Component Value Date/Time    TSH 3.080 06/30/2020 05:11 AM    XLYQQLON49 449 10/14/2020 11:14 AM    FOLATE 5.4 10/14/2020 11:14 AM    FERRITIN 842.5 09/10/2018 12:26 PM    IRON 154 11/13/2019 11:32 AM    TIBC 318 11/13/2019 11:32 AM     Lab Results   Component Value Date/Time    TRIG 45 11/13/2019 11:32 AM     11/13/2019 11:32 AM    LDLCALC 74 11/13/2019 11:32 AM     Lab Results   Component Value Date/Time    LABAMPH Neg 07/14/2017 06:44 PM    BARBSCNU Neg 07/14/2017 06:44 PM    LABBENZ Neg 07/14/2017 06:44 PM    OPIATESCREENURINE Neg 07/14/2017 06:44 PM    PHENCYCLIDINESCREENURINE Neg 07/14/2017 06:44 PM    ETOH 137 10/15/2019 10:33 AM     No results found for: LITHIUM, DILFRTOT, VALPROATE    Assessment:       Diagnosis Orders   1. PD (Parkinson's disease) ([facility] [address]        2. Falls frequently        3. Ataxic gait        4. Balance disorder        5. Primary orthostatic hypotension          Parkinson's disease which appears to be stable. He is on carbidopa levodopa 3 times a day and we are not recommended any intervention as he is also on ProAmatine due to hypotension. He has not any further loss of consciousness or dizzy spells he is holding his blood pressure quite well. This is not uncommon with current dopamine agonist.  Next patient actually is doing well otherwise overall and has not any difficulty swallowing or choking or memory issues or hallucinations. We actually keep in close observation as we do not want to increase medication further for now. Patient does have constipation and uses Linzess and fiber as and when required and this has been optimally treated. We will keep an eye on this and continue to follow. Plan:      No orders of the defined types were placed in this encounter. No orders of the defined types were placed in this encounter. No follow-ups on file.       Luzmaria Randle MD

## 2022-11-01 ENCOUNTER — OFFICE VISIT (OUTPATIENT)
Dept: UROLOGY | Age: 82
End: 2022-11-01
Payer: MEDICARE

## 2022-11-01 VITALS
WEIGHT: 150 LBS | HEART RATE: 83 BPM | DIASTOLIC BLOOD PRESSURE: 60 MMHG | BODY MASS INDEX: 20.32 KG/M2 | SYSTOLIC BLOOD PRESSURE: 110 MMHG | HEIGHT: 72 IN

## 2022-11-01 DIAGNOSIS — N13.8 BPH WITH OBSTRUCTION/LOWER URINARY TRACT SYMPTOMS: Primary | ICD-10-CM

## 2022-11-01 DIAGNOSIS — N40.1 BPH WITH OBSTRUCTION/LOWER URINARY TRACT SYMPTOMS: Primary | ICD-10-CM

## 2022-11-01 PROCEDURE — 1123F ACP DISCUSS/DSCN MKR DOCD: CPT | Performed by: UROLOGY

## 2022-11-01 PROCEDURE — G8484 FLU IMMUNIZE NO ADMIN: HCPCS | Performed by: UROLOGY

## 2022-11-01 PROCEDURE — G8420 CALC BMI NORM PARAMETERS: HCPCS | Performed by: UROLOGY

## 2022-11-01 PROCEDURE — 51798 US URINE CAPACITY MEASURE: CPT | Performed by: UROLOGY

## 2022-11-01 PROCEDURE — G8427 DOCREV CUR MEDS BY ELIG CLIN: HCPCS | Performed by: UROLOGY

## 2022-11-01 PROCEDURE — 1036F TOBACCO NON-USER: CPT | Performed by: UROLOGY

## 2022-11-01 PROCEDURE — 3074F SYST BP LT 130 MM HG: CPT | Performed by: UROLOGY

## 2022-11-01 PROCEDURE — 99213 OFFICE O/P EST LOW 20 MIN: CPT | Performed by: UROLOGY

## 2022-11-01 PROCEDURE — 3078F DIAST BP <80 MM HG: CPT | Performed by: UROLOGY

## 2022-11-01 ASSESSMENT — ENCOUNTER SYMPTOMS
ABDOMINAL DISTENTION: 0
ABDOMINAL PAIN: 0

## 2022-11-01 NOTE — PROGRESS NOTES
Subjective:      Patient ID: Elizabeth Rockwell is a 79 y.o. male    HPI  This is an 78 yo male with Parkinson's dz with gait compromise, AV stenosis, HTN, OA, BPH and back in follow-up. Since last seen on 10/22/21 he has no new complaints and remains on Proscar and Ditropan 5 mg Xl. He is still having occasional frequency and some urgency but no UI and remains stable. He has no SE from these medications. He has no hematuria or dysuria or pain. He is here with his wife and he has no other new complaints. He voided prior to the visit.      Past Medical History:   Diagnosis Date    Actinic keratoses     Aortic valve stenosis, severe 9/20/2013    BPH (benign prostatic hyperplasia)     Cancer ([facility] [address] 04/2018    skin cancer on chest removed    Colon polyp 92/66/1512    Diastolic dysfunction 29/25/0741    Stage 2 by echo    ED (erectile dysfunction) 11/29/2011    Glaucoma     Dr. Elizabet Calabrese    Hemochromatosis     History of echocardiogram 02/18/2015    mild AS, mild AR, mild TR, mild MR    [place]    [place]    Hypertension     Iron deficiency anemia 4/28/2014    LVH (left ventricular hypertrophy) 9/20/2013    Macular degeneration     bilateral; dry on left and wet on right-Dr. Cristofer Harrington    Mass on back 5/14/2015    Osteoarthritis cervical spine     Parkinson disease ([facility] [address]     Dr. 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 She is in good health and she still drives. He is retired. He has 3 children all of which live out of the area one in [place] 1 in [place] and one in the [facility]. 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 Pt reports high level of indep prior to admission. History reviewed. No pertinent family history. Current Outpatient Medications   Medication Sig Dispense Refill    oxybutynin (DITROPAN-XL) 5 MG extended release tablet TAKE 1 TABLET DAILY 90 tablet 3    finasteride (PROSCAR) 5 MG tablet Take 1 tablet by mouth daily 90 tablet 3    midodrine (PROAMATINE) 5 MG tablet Take 5 mg by mouth 3 times daily      Cholecalciferol (VITAMIN D3) 2000 units CAPS Take 1,000 Units by mouth daily      vitamin B-12 (CYANOCOBALAMIN) 1000 MCG tablet Take 1,000 mcg by mouth daily Every other day.       Dorzolamide HCl-Timolol Mal (COSOPT OP) Apply 1 drop to eye 2 times daily      folic acid (FOLVITE) 956 MCG tablet Take 1 tablet by mouth daily       carbidopa-levodopa (SINEMET)  MG per tablet Take 1 tablet by mouth 3 times daily      bimatoprost (LUMIGAN) 0.03 % ophthalmic drops Place 1 drop into the left eye nightly. No current facility-administered medications for this visit. Cat hair extract  reviewed      Review of Systems   Constitutional:  Negative for unexpected weight change. Gastrointestinal:  Negative for abdominal distention and abdominal pain. Genitourinary:  Negative for dysuria, flank pain and hematuria. Objective:   Physical Exam  Abdominal:      General: There is no distension. Neurological:      Mental Status: He is alert. Psychiatric:         Mood and Affect: Mood normal.        post void residual by U/S: 83 cc    Assessment: This is an 80 yo male with Parkinson's dz, AV stenosis, HTN, OA, BPH and back in follow-up with stable frequency and urgency related to Parkinson's and is reasonably emptying the bladder by PVR. Will continue with present management.        Plan:      F/U 1 yr for Emily Sotelo MD

## 2023-02-10 DIAGNOSIS — N13.8 BPH WITH OBSTRUCTION/LOWER URINARY TRACT SYMPTOMS: ICD-10-CM

## 2023-02-10 DIAGNOSIS — N40.1 BPH WITH OBSTRUCTION/LOWER URINARY TRACT SYMPTOMS: ICD-10-CM

## 2023-02-14 RX ORDER — FINASTERIDE 5 MG/1
TABLET, FILM COATED ORAL
Qty: 90 TABLET | Refills: 3 | Status: SHIPPED | OUTPATIENT
Start: 2023-02-14

## 2023-02-14 RX ORDER — OXYBUTYNIN CHLORIDE 5 MG/1
TABLET, EXTENDED RELEASE ORAL
Qty: 90 TABLET | Refills: 3 | Status: SHIPPED | OUTPATIENT
Start: 2023-02-14